# Patient Record
Sex: MALE | Race: WHITE | NOT HISPANIC OR LATINO | Employment: OTHER | URBAN - METROPOLITAN AREA
[De-identification: names, ages, dates, MRNs, and addresses within clinical notes are randomized per-mention and may not be internally consistent; named-entity substitution may affect disease eponyms.]

---

## 2017-04-16 ENCOUNTER — HOSPITAL ENCOUNTER (EMERGENCY)
Facility: HOSPITAL | Age: 82
Discharge: HOME | End: 2017-04-16
Attending: EMERGENCY MEDICINE
Payer: MEDICARE

## 2017-04-16 VITALS
BODY MASS INDEX: 31.58 KG/M2 | HEIGHT: 64 IN | RESPIRATION RATE: 16 BRPM | HEART RATE: 69 BPM | WEIGHT: 185 LBS | TEMPERATURE: 98 F | SYSTOLIC BLOOD PRESSURE: 139 MMHG | OXYGEN SATURATION: 97 % | DIASTOLIC BLOOD PRESSURE: 66 MMHG

## 2017-04-16 DIAGNOSIS — R42 DIZZY: ICD-10-CM

## 2017-04-16 DIAGNOSIS — R42 VERTIGO: Primary | ICD-10-CM

## 2017-04-16 LAB
ALBUMIN SERPL BCP-MCNC: 3.8 G/DL
ALP SERPL-CCNC: 99 U/L
ALT SERPL W/O P-5'-P-CCNC: 23 U/L
ANION GAP SERPL CALC-SCNC: 10 MMOL/L
AST SERPL-CCNC: 24 U/L
BASOPHILS # BLD AUTO: 0.03 K/UL
BASOPHILS NFR BLD: 0.3 %
BILIRUB SERPL-MCNC: 0.5 MG/DL
BUN SERPL-MCNC: 11 MG/DL
CALCIUM SERPL-MCNC: 9.1 MG/DL
CHLORIDE SERPL-SCNC: 103 MMOL/L
CO2 SERPL-SCNC: 26 MMOL/L
CREAT SERPL-MCNC: 0.8 MG/DL
DIFFERENTIAL METHOD: ABNORMAL
EOSINOPHIL # BLD AUTO: 0.2 K/UL
EOSINOPHIL NFR BLD: 1.4 %
ERYTHROCYTE [DISTWIDTH] IN BLOOD BY AUTOMATED COUNT: 14.3 %
EST. GFR  (AFRICAN AMERICAN): >60 ML/MIN/1.73 M^2
EST. GFR  (NON AFRICAN AMERICAN): >60 ML/MIN/1.73 M^2
GLUCOSE SERPL-MCNC: 118 MG/DL
HCT VFR BLD AUTO: 41.2 %
HGB BLD-MCNC: 13.8 G/DL
LYMPHOCYTES # BLD AUTO: 1.8 K/UL
LYMPHOCYTES NFR BLD: 17.6 %
MCH RBC QN AUTO: 29.7 PG
MCHC RBC AUTO-ENTMCNC: 33.5 %
MCV RBC AUTO: 89 FL
MONOCYTES # BLD AUTO: 0.9 K/UL
MONOCYTES NFR BLD: 8.4 %
NEUTROPHILS # BLD AUTO: 7.5 K/UL
NEUTROPHILS NFR BLD: 72.3 %
PLATELET # BLD AUTO: 248 K/UL
PMV BLD AUTO: 9.2 FL
POTASSIUM SERPL-SCNC: 4.1 MMOL/L
PROT SERPL-MCNC: 7.3 G/DL
RBC # BLD AUTO: 4.65 M/UL
SODIUM SERPL-SCNC: 139 MMOL/L
WBC # BLD AUTO: 10.37 K/UL

## 2017-04-16 PROCEDURE — 80053 COMPREHEN METABOLIC PANEL: CPT

## 2017-04-16 PROCEDURE — 85025 COMPLETE CBC W/AUTO DIFF WBC: CPT

## 2017-04-16 PROCEDURE — 99284 EMERGENCY DEPT VISIT MOD MDM: CPT | Mod: 25

## 2017-04-16 PROCEDURE — 93010 ELECTROCARDIOGRAM REPORT: CPT | Mod: ,,, | Performed by: INTERNAL MEDICINE

## 2017-04-16 PROCEDURE — 93005 ELECTROCARDIOGRAM TRACING: CPT

## 2017-04-16 PROCEDURE — 25000003 PHARM REV CODE 250: Performed by: EMERGENCY MEDICINE

## 2017-04-16 RX ORDER — MECLIZINE HYDROCHLORIDE 25 MG/1
25 TABLET ORAL 3 TIMES DAILY PRN
Qty: 20 TABLET | Refills: 0 | Status: SHIPPED | OUTPATIENT
Start: 2017-04-16 | End: 2024-03-06

## 2017-04-16 RX ORDER — MECLIZINE HYDROCHLORIDE 25 MG/1
25 TABLET ORAL
Status: COMPLETED | OUTPATIENT
Start: 2017-04-16 | End: 2017-04-16

## 2017-04-16 RX ADMIN — MECLIZINE HYDROCHLORIDE 25 MG: 25 TABLET ORAL at 08:04

## 2017-04-16 NOTE — ED AVS SNAPSHOT
[facility]  [address] [facility] [address]               Troy Chau   2017  5:55 PM   ED    Description:  Male : 1935   Department:  [facility]           Your Care was Coordinated By:     Provider Role From To    Kole Cardona Jr., MD Attending Provider 17 [phone] --      Reason for Visit     Dizziness           Diagnoses this Visit        Comments    Vertigo    -  Primary     Dizzy           ED Disposition     None           To Do List           Follow-up Information     Follow up with Tanvir Petersen MD. Schedule an appointment as soon as possible for a visit in 2 days.    Specialty:  Internal Medicine    Why:  As needed    Contact information:    [address] [facility] [address] [phone]         These Medications        Disp Refills Start End    meclizine (ANTIVERT) 25 mg tablet 20 tablet 0 2017     Take 1 tablet (25 mg total) by mouth 3 (three) times daily as needed for Dizziness. - Oral      [facility]     [facility] Nurse Care Line -  Assistance  Unless otherwise directed by your provider, please contact [facility], our nurse care line that is available for  assistance.     Registered nurses in the [facility] provide: appointment scheduling, clinical advisement, health education, and other advisory services.  Call: [phone] (toll free)               Medications           START taking these NEW medications        Refills    meclizine (ANTIVERT) 25 mg tablet 0    Sig: Take 1 tablet (25 mg total) by mouth 3 (three) times daily as needed for Dizziness.    Class: Print    Route: Oral           Verify that the below list of medications is an accurate representation of the medications you are currently taking.  If none reported, the list may be blank. If incorrect, please contact your healthcare provider. Carry this list with you in case of emergency.          "  Current Medications     aspirin (ECOTRIN) 81 MG EC tablet Take 81 mg by mouth once daily.    meclizine (ANTIVERT) 25 mg tablet Take 1 tablet (25 mg total) by mouth 3 (three) times daily as needed for Dizziness.    tamsulosin (FLOMAX) 0.4 mg Cp24 Take 0.4 mg by mouth once daily.           Clinical Reference Information           Your Vitals Were     BP Pulse Temp Resp Height Weight    187/83 (BP Location: Right arm, Patient Position: Sitting) 64 98 °F (36.7 °C) (Oral) 20 5' 4" (1.626 m) 83.9 kg (185 lb)    SpO2 BMI             96% 31.76 kg/m2         Allergies as of 4/16/2017     No Known Allergies      Immunizations Administered on Date of Encounter - 4/16/2017     None      ED Micro, Lab, POCT     Start Ordered       Status Ordering Provider    04/16/17 1819 04/16/17 1818  CBC auto differential  STAT      Final result     04/16/17 1819 04/16/17 1818  Comprehensive metabolic panel  STAT      In process       ED Imaging Orders     Start Ordered       Status Ordering Provider    04/16/17 1819 04/16/17 1818  CT Head Without Contrast  1 time imaging      Final result         Discharge Instructions         Vertigo (Unknown Cause)    In addition to helping with hearing, the inner ear is part of the balance center of your body. Problems with the inner ear can a false feeling of motion. This is called vertigo. Often, it feels as if you or the room is spinning. A vertigo attack may cause sudden nausea, vomiting and heavy sweating. Severe vertigo causes a loss of balance and can cause you to fall. During vertigo, small head movements and changes in body position will often make the symptoms worse. You may also have ringing in the ears called tinnitus.  An episode of vertigo may last seconds, minutes or hours. Once you are over the first episode, it may never come back. However, symptoms may return off and on.  The cause of your vertigo is not yet known. Possible causes of vertigo include:  · Inflammation of the inner " ear  · Disease of the nerves to the inner ear  · Movement of calcium particles in the inner ear  · Poor blood flow to the balance centers of the brain  · Migraine headaches  Home care  · If symptoms are severe, rest quietly in bed. Change positions very slowly. There is usually one position that will feel best, such as lying on one side or lying on your back with your head slightly raised on pillows.  · Do not drive a car or work with dangerous machinery until symptoms have been gone for at least one week.  · Take medicine as prescribed to relieve your symptoms. Unless another medicine was prescribed for symptoms of nausea, vomiting, and dizziness, you may use over-the-counter motion sickness pills. Ask your pharmacist for suggestions.  Follow-up care  Follow up with your healthcare provider or as directed. If you are referred to a specialist or for testing, make the appointment promptly.  When to seek medical advice  Call your healthcare provider if any of the following occur:  · Fever of 100.4°F (38ºC) or higher, or as directed by your healthcare provider  · Vertigo worsens or is not controlled by prescribed medicine   · Repeated vomiting not relieved by prescribed medicine   · Severe headache  · Confusion  · Weakness of an arm or leg or one side of the face  · Difficulty with speech or vision  · Loss of consciousness   · Seizure  Date Last Reviewed: 8/16/2015  © 8723-9657 [employer]. [address] [place] [address], [place] [address]. All rights reserved. This information is not intended as a substitute for professional medical care. Always follow your healthcare professional's instructions.          Vertigo (Unknown Cause)    In addition to helping with hearing, the inner ear is part of the balance center of your body. Problems with the inner ear can a false feeling of motion. This is called vertigo. Often, it feels as if you or the room is spinning. A vertigo attack may cause sudden nausea, vomiting and  heavy sweating. Severe vertigo causes a loss of balance and can cause you to fall. During vertigo, small head movements and changes in body position will often make the symptoms worse. You may also have ringing in the ears called tinnitus.  An episode of vertigo may last seconds, minutes or hours. Once you are over the first episode, it may never come back. However, symptoms may return off and on.  The cause of your vertigo is not yet known. Possible causes of vertigo include:  · Inflammation of the inner ear  · Disease of the nerves to the inner ear  · Movement of calcium particles in the inner ear  · Poor blood flow to the balance centers of the brain  · Migraine headaches  Home care  · If symptoms are severe, rest quietly in bed. Change positions very slowly. There is usually one position that will feel best, such as lying on one side or lying on your back with your head slightly raised on pillows.  · Do not drive a car or work with dangerous machinery until symptoms have been gone for at least one week.  · Take medicine as prescribed to relieve your symptoms. Unless another medicine was prescribed for symptoms of nausea, vomiting, and dizziness, you may use over-the-counter motion sickness pills. Ask your pharmacist for suggestions.  Follow-up care  Follow up with your healthcare provider or as directed. If you are referred to a specialist or for testing, make the appointment promptly.  When to seek medical advice  Call your healthcare provider if any of the following occur:  · Fever of 100.4°F (38ºC) or higher, or as directed by your healthcare provider  · Vertigo worsens or is not controlled by prescribed medicine   · Repeated vomiting not relieved by prescribed medicine   · Severe headache  · Confusion  · Weakness of an arm or leg or one side of the face  · Difficulty with speech or vision  · Loss of consciousness   · Seizure  Date Last Reviewed: 8/16/2015  © 9321-7767 [employer]. [address] [place] [address]  [place] [address]. All rights reserved. This information is not intended as a substitute for professional medical care. Always follow your healthcare professional's instructions.          [facility] Sign-Up     Activating your [facility] account is as easy as 1-2-3!     1) Visit [email or web address], select Sign Up Now, enter this activation code and your date of birth, then select Next.  [email or web address]  Expires: 5/31/2017  7:32 PM      2) Create a username and password to use when you visit [facility] in the future and select a security question in case you lose your password and select Next.    3) Enter your e-mail address and click Sign Up!    Additional Information  If you have questions, please e-mail [email or web address] or call [phone] to talk to our [facility] staff. Remember, [facility] is NOT to be used for urgent needs. For medical emergencies, dial 911.         Smoking Cessation     If you would like to quit smoking:   You may be eligible for free services if you are a [place] resident and started smoking cigarettes before September 1, 1988.  Call the Smoking Cessation Trust ([facility]) toll free at (635) 261-9545 or (424) 225-4411.   Call 1-800-QUIT-NOW if you do not meet the above criteria.   Contact us via email: [email or web address]   View our website for more information: [email or web address]         [facility]  complies with applicable Federal civil rights laws and does not discriminate on the basis of race, color, national origin, age, disability, or sex.        Language Assistance Services     ATTENTION: Language assistance services are available, free of charge. Please call [phone].      ATENCIÓN: Si habla huber, tiene a ball disposición servicios gratuitos de asistencia lingüística. Llame al [phone].     CHÚ Ý: N?u b?n nói Ti?ng Vi?t, có các d?ch v? h? tr? ngôn ng? mi?n phí dành cho b?n. G?i s? [phone].

## 2017-04-16 NOTE — ED PROVIDER NOTES
"SCRIBE #1 NOTE: I, Marie Manny, am scribing for, and in the presence of, Kole Cardona Jr., MD. I have scribed the entire note.      History      Chief Complaint   Patient presents with    Dizziness     states on and off throughout the day       Review of patient's allergies indicates:  No Known Allergies     HPI   HPI    4/16/2017, 6:20 PM   History obtained from the patient      History of Present Illness: Troy Chau is a 81 y.o. male patient who presents to the Emergency Department for dizziness which onset gradually throughout the day. Symptoms are episodic and moderate in severity. Pt reports when getting up after a while he got dizzy and fell. The patient describes the sxs as "the room spinning". No mitigating or exacerbating factors reported. No associated sxs at this time. Patient denies any head trauma, congestion, ear pain, cough, fever, chills, and all other sxs at this time. No prior Tx. No further complaints or concerns at this time.         Arrival mode: Personal vehicle     PCP: Tanvir Petersen MD       Past Medical History:  Past Medical History:   Diagnosis Date    Smoker     2 pack a day       Past Surgical History:  Past Surgical History:   Procedure Laterality Date    APPENDECTOMY      APPENDECTOMY      CHOLECYSTECTOMY      COLONOSCOPY      EYE SURGERY           Family History:  Family History   Problem Relation Age of Onset    Diabetes Mother     Heart disease Father        Social History:  Social History     Social History Main Topics    Smoking status: Current Every Day Smoker     Packs/day: 2.00     Years: 60.00    Smokeless tobacco: Unknown    Alcohol use Yes      Comment: not much lately    Drug use: No    Sexual activity: Unknown        ROS   Review of Systems   Constitutional: Negative for chills and fever.   HENT: Negative for congestion, ear pain and sore throat.    Respiratory: Negative for cough and shortness of breath.    Cardiovascular: Negative for chest pain. " "  Gastrointestinal: Negative for nausea.   Genitourinary: Negative for dysuria.   Musculoskeletal: Negative for back pain.   Skin: Negative for rash.   Neurological: Positive for dizziness. Negative for syncope and weakness.        (-) head trauma   Hematological: Does not bruise/bleed easily.   All other systems reviewed and are negative.      Physical Exam    Initial Vitals   BP Pulse Resp Temp SpO2   04/16/17 1754 04/16/17 1754 04/16/17 1754 04/16/17 1754 04/16/17 1754   187/83 72 20 98 °F (36.7 °C) 96 %      Physical Exam  Nursing Notes and Vital Signs Reviewed.  Constitutional: Patient is anxious. Awake and alert. Well-developed and well-nourished.  Head: Atraumatic. Normocephalic.  Eyes: PERRL. EOM intact. Conjunctivae are not pale. No scleral icterus.  ENT: Mucous membranes are moist. Oropharynx is clear and symmetric.    Neck: Supple. Full ROM. No lymphadenopathy.  Cardiovascular: Regular rate. Regular rhythm. No murmurs, rubs, or gallops. Distal pulses are 2+ and symmetric.  Pulmonary/Chest: No respiratory distress. Clear to auscultation bilaterally. No wheezing, rales, or rhonchi.  Abdominal: Soft and non-distended.  There is no tenderness.  No rebound, guarding, or rigidity. Good bowel sounds.  Genitourinary: No CVA tenderness  Musculoskeletal: Moves all extremities. No obvious deformities. No edema. No calf tenderness. Slight slow gate but family member says it's baseline.  Skin: Warm and dry.  Neurological:  Alert, awake, and appropriate.  Normal speech.  No acute focal neurological deficits are appreciated. Dizziness with head turn.   Psychiatric: Normal affect. Good eye contact. Appropriate in content.      ED Course    Procedures  ED Vital Signs:  Vitals:    04/16/17 1754 04/16/17 1901   BP: (!) 187/83    Pulse: 72 64   Resp: 20    Temp: 98 °F (36.7 °C)    TempSrc: Oral    SpO2: 96%    Weight: 83.9 kg (185 lb)    Height: 5' 4" (1.626 m)        Abnormal Lab Results:  Labs Reviewed   CBC W/ AUTO " DIFFERENTIAL - Abnormal; Notable for the following:        Result Value    Hemoglobin 13.8 (*)     Lymph% 17.6 (*)     All other components within normal limits   COMPREHENSIVE METABOLIC PANEL - Abnormal; Notable for the following:     Glucose 118 (*)     All other components within normal limits        All Lab Results:  Results for orders placed or performed during the hospital encounter of 04/16/17   CBC auto differential   Result Value Ref Range    WBC 10.37 3.90 - 12.70 K/uL    RBC 4.65 4.60 - 6.20 M/uL    Hemoglobin 13.8 (L) 14.0 - 18.0 g/dL    Hematocrit 41.2 40.0 - 54.0 %    MCV 89 82 - 98 fL    MCH 29.7 27.0 - 31.0 pg    MCHC 33.5 32.0 - 36.0 %    RDW 14.3 11.5 - 14.5 %    Platelets 248 150 - 350 K/uL    MPV 9.2 9.2 - 12.9 fL    Gran # 7.5 1.8 - 7.7 K/uL    Lymph # 1.8 1.0 - 4.8 K/uL    Mono # 0.9 0.3 - 1.0 K/uL    Eos # 0.2 0.0 - 0.5 K/uL    Baso # 0.03 0.00 - 0.20 K/uL    Gran% 72.3 38.0 - 73.0 %    Lymph% 17.6 (L) 18.0 - 48.0 %    Mono% 8.4 4.0 - 15.0 %    Eosinophil% 1.4 0.0 - 8.0 %    Basophil% 0.3 0.0 - 1.9 %    Differential Method Automated    Comprehensive metabolic panel   Result Value Ref Range    Sodium 139 136 - 145 mmol/L    Potassium 4.1 3.5 - 5.1 mmol/L    Chloride 103 95 - 110 mmol/L    CO2 26 23 - 29 mmol/L    Glucose 118 (H) 70 - 110 mg/dL    BUN, Bld 11 8 - 23 mg/dL    Creatinine 0.8 0.5 - 1.4 mg/dL    Calcium 9.1 8.7 - 10.5 mg/dL    Total Protein 7.3 6.0 - 8.4 g/dL    Albumin 3.8 3.5 - 5.2 g/dL    Total Bilirubin 0.5 0.1 - 1.0 mg/dL    Alkaline Phosphatase 99 55 - 135 U/L    AST 24 10 - 40 U/L    ALT 23 10 - 44 U/L    Anion Gap 10 8 - 16 mmol/L    eGFR if African American >60 >60 mL/min/1.73 m^2    eGFR if non African American >60 >60 mL/min/1.73 m^2         Imaging Results:  Imaging Results         CT Head Without Contrast (Final result) Result time:  04/16/17 18:38:03    Final result by Matt Landis MD (04/16/17 18:38:03)    Impression:         1. No CT evidence of acute intracranial  abnormality.  2.  Severe bilateral frontal lobe atrophy.    All CT scans at this facility use dose modulation, iterative reconstruction and/or weight based dosing when appropriate to reduce radiation dose to as low as reasonably achievable.      Electronically signed by: FARTUN KING MD  Date:     04/16/17  Time:    18:38     Narrative:    Exam: CT scan of the head without contrast    Clinical History:  dizziness.      Technique: Axial CT imaging was performed through the head without intravenous contrast.     Findings:    There is severe bilateral frontal lobe atrophy. No mass lesion or extra-axial fluid collection or cerebral edema.  No intracranial hemorrhage. Basilar cisterns and posterior fossa are normal. The visualized paranasal sinuses and mastoid air cells are clear. The skull is intact.             The EKG was ordered, reviewed, and independently interpreted by the ED provider.  Interpretation time: 19:01  Rate: 64 BPM  Rhythm: normal sinus rhythm  Interpretation: Pulmonary disease pattern. Left anterior fascicular block. Minimal voltage criteria for LVH, may be normal variant. No STEMI.             The Emergency Provider reviewed the vital signs and test results, which are outlined above.    ED Discussion       ED Medication(s):  Medications - No data to display    New Prescriptions    MECLIZINE (ANTIVERT) 25 MG TABLET    Take 1 tablet (25 mg total) by mouth 3 (three) times daily as needed for Dizziness.       Follow-up Information     Follow up with Tanvir Petersen MD. Schedule an appointment as soon as possible for a visit in 2 days.    Specialty:  Internal Medicine    Why:  As needed    Contact information:    [address] [facility] [address] [phone]              Medical Decision Making              Scribe Attestation:   Scribe #1: I performed the above scribed service and the documentation accurately describes the services I performed. I attest to the accuracy of the note.    Attending:    Physician Attestation Statement for Scribe #1: I, Kole Cardona Jr., MD, personally performed the services described in this documentation, as scribed by Marie Bryant, in my presence, and it is both accurate and complete.          Clinical Impression       ICD-10-CM ICD-9-CM   1. Vertigo R42 780.4   2. Dizzy R42 780.4       Disposition:   Disposition: Discharged  Condition: Stable         Kole Cardona Jr., MD  04/16/17 1957

## 2017-04-17 NOTE — DISCHARGE INSTRUCTIONS
Vertigo (Unknown Cause)    In addition to helping with hearing, the inner ear is part of the balance center of your body. Problems with the inner ear can a false feeling of motion. This is called vertigo. Often, it feels as if you or the room is spinning. A vertigo attack may cause sudden nausea, vomiting and heavy sweating. Severe vertigo causes a loss of balance and can cause you to fall. During vertigo, small head movements and changes in body position will often make the symptoms worse. You may also have ringing in the ears called tinnitus.  An episode of vertigo may last seconds, minutes or hours. Once you are over the first episode, it may never come back. However, symptoms may return off and on.  The cause of your vertigo is not yet known. Possible causes of vertigo include:  · Inflammation of the inner ear  · Disease of the nerves to the inner ear  · Movement of calcium particles in the inner ear  · Poor blood flow to the balance centers of the brain  · Migraine headaches  Home care  · If symptoms are severe, rest quietly in bed. Change positions very slowly. There is usually one position that will feel best, such as lying on one side or lying on your back with your head slightly raised on pillows.  · Do not drive a car or work with dangerous machinery until symptoms have been gone for at least one week.  · Take medicine as prescribed to relieve your symptoms. Unless another medicine was prescribed for symptoms of nausea, vomiting, and dizziness, you may use over-the-counter motion sickness pills. Ask your pharmacist for suggestions.  Follow-up care  Follow up with your healthcare provider or as directed. If you are referred to a specialist or for testing, make the appointment promptly.  When to seek medical advice  Call your healthcare provider if any of the following occur:  · Fever of 100.4°F (38ºC) or higher, or as directed by your healthcare provider  · Vertigo worsens or is not controlled  by prescribed medicine   · Repeated vomiting not relieved by prescribed medicine   · Severe headache  · Confusion  · Weakness of an arm or leg or one side of the face  · Difficulty with speech or vision  · Loss of consciousness   · Seizure  Date Last Reviewed: 8/16/2015 © 2000-2016 [employer]. [address] [place] [address]. All rights reserved. This information is not intended as a substitute for professional medical care. Always follow your healthcare professional's instructions.          Vertigo (Unknown Cause)    In addition to helping with hearing, the inner ear is part of the balance center of your body. Problems with the inner ear can a false feeling of motion. This is called vertigo. Often, it feels as if you or the room is spinning. A vertigo attack may cause sudden nausea, vomiting and heavy sweating. Severe vertigo causes a loss of balance and can cause you to fall. During vertigo, small head movements and changes in body position will often make the symptoms worse. You may also have ringing in the ears called tinnitus.  An episode of vertigo may last seconds, minutes or hours. Once you are over the first episode, it may never come back. However, symptoms may return off and on.  The cause of your vertigo is not yet known. Possible causes of vertigo include:  · Inflammation of the inner ear  · Disease of the nerves to the inner ear  · Movement of calcium particles in the inner ear  · Poor blood flow to the balance centers of the brain  · Migraine headaches  Home care  · If symptoms are severe, rest quietly in bed. Change positions very slowly. There is usually one position that will feel best, such as lying on one side or lying on your back with your head slightly raised on pillows.  · Do not drive a car or work with dangerous machinery until symptoms have been gone for at least one week.  · Take medicine as prescribed to relieve your symptoms. Unless another medicine was  prescribed for symptoms of nausea, vomiting, and dizziness, you may use over-the-counter motion sickness pills. Ask your pharmacist for suggestions.  Follow-up care  Follow up with your healthcare provider or as directed. If you are referred to a specialist or for testing, make the appointment promptly.  When to seek medical advice  Call your healthcare provider if any of the following occur:  · Fever of 100.4°F (38ºC) or higher, or as directed by your healthcare provider  · Vertigo worsens or is not controlled by prescribed medicine   · Repeated vomiting not relieved by prescribed medicine   · Severe headache  · Confusion  · Weakness of an arm or leg or one side of the face  · Difficulty with speech or vision  · Loss of consciousness   · Seizure  Date Last Reviewed: 8/16/2015  © 3803-5933 [employer]. [address] [place] [address], [place] [address]. All rights reserved. This information is not intended as a substitute for professional medical care. Always follow your healthcare professional's instructions.

## 2017-04-17 NOTE — ED NOTES
Pt reports relief in dizziness. Pt able to stand up and ambulate in room. NAD noted. AAO x 3. VSS. RR e/u. Airway open and patent. Family at bedside. Will continue with discharge.

## 2018-09-15 ENCOUNTER — HOSPITAL ENCOUNTER (EMERGENCY)
Facility: HOSPITAL | Age: 83
Discharge: HOME | End: 2018-09-15
Attending: EMERGENCY MEDICINE
Payer: MEDICARE

## 2018-09-15 VITALS
DIASTOLIC BLOOD PRESSURE: 72 MMHG | OXYGEN SATURATION: 97 % | SYSTOLIC BLOOD PRESSURE: 139 MMHG | HEART RATE: 72 BPM | TEMPERATURE: 98 F

## 2018-09-15 DIAGNOSIS — S16.1XXA STRAIN OF NECK MUSCLE, INITIAL ENCOUNTER: Primary | ICD-10-CM

## 2018-09-15 DIAGNOSIS — Z04.1 ENCOUNTER FOR EXAMINATION FOLLOWING MOTOR VEHICLE COLLISION (MVC): ICD-10-CM

## 2018-09-15 DIAGNOSIS — M54.2 CERVICALGIA: ICD-10-CM

## 2018-09-15 PROCEDURE — 25000003 PHARM REV CODE 250: Performed by: EMERGENCY MEDICINE

## 2018-09-15 PROCEDURE — 99283 EMERGENCY DEPT VISIT LOW MDM: CPT | Mod: 25

## 2018-09-15 RX ORDER — AMLODIPINE BESYLATE 5 MG/1
5 TABLET ORAL DAILY
COMMUNITY

## 2018-09-15 RX ORDER — CYCLOBENZAPRINE HCL 10 MG
10 TABLET ORAL 3 TIMES DAILY PRN
Qty: 15 TABLET | Refills: 0 | Status: SHIPPED | OUTPATIENT
Start: 2018-09-15 | End: 2018-09-20

## 2018-09-15 RX ORDER — HYDROCODONE BITARTRATE AND ACETAMINOPHEN 5; 325 MG/1; MG/1
1 TABLET ORAL
Status: COMPLETED | OUTPATIENT
Start: 2018-09-15 | End: 2018-09-15

## 2018-09-15 RX ORDER — CYCLOBENZAPRINE HCL 10 MG
10 TABLET ORAL
Status: COMPLETED | OUTPATIENT
Start: 2018-09-15 | End: 2018-09-15

## 2018-09-15 RX ADMIN — HYDROCODONE BITARTRATE AND ACETAMINOPHEN 1 TABLET: 5; 325 TABLET ORAL at 11:09

## 2018-09-15 RX ADMIN — CYCLOBENZAPRINE HYDROCHLORIDE 10 MG: 10 TABLET, FILM COATED ORAL at 11:09
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
## 2019-07-19 ENCOUNTER — HOSPITAL ENCOUNTER (OUTPATIENT)
Facility: HOSPITAL | Age: 84
Discharge: NURSING, REHAB OR LONG-TERM CARE | End: 2019-07-23
Attending: EMERGENCY MEDICINE | Admitting: HOSPITALIST
Payer: MEDICARE

## 2019-07-19 DIAGNOSIS — M25.552 BILATERAL HIP PAIN: ICD-10-CM

## 2019-07-19 DIAGNOSIS — T79.6XXA TRAUMATIC RHABDOMYOLYSIS, INITIAL ENCOUNTER: ICD-10-CM

## 2019-07-19 DIAGNOSIS — N17.9 ACUTE RENAL FAILURE DUE TO TRAUMATIC RHABDOMYOLYSIS: ICD-10-CM

## 2019-07-19 DIAGNOSIS — J18.9 PNEUMONIA OF LEFT LOWER LOBE DUE TO INFECTIOUS ORGANISM: ICD-10-CM

## 2019-07-19 DIAGNOSIS — R06.02 SOB (SHORTNESS OF BREATH): ICD-10-CM

## 2019-07-19 DIAGNOSIS — N17.9 AKI (ACUTE KIDNEY INJURY): ICD-10-CM
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
## 2019-07-19 DIAGNOSIS — J44.9 CHRONIC OBSTRUCTIVE PULMONARY DISEASE, UNSPECIFIED COPD TYPE: Primary | ICD-10-CM

## 2019-07-19 DIAGNOSIS — S80.212A ABRASION OF KNEE, BILATERAL: ICD-10-CM

## 2019-07-19 DIAGNOSIS — R06.2 WHEEZING: ICD-10-CM

## 2019-07-19 DIAGNOSIS — T79.6XXA ACUTE RENAL FAILURE DUE TO TRAUMATIC RHABDOMYOLYSIS: ICD-10-CM

## 2019-07-19 DIAGNOSIS — S80.211A ABRASION OF KNEE, BILATERAL: ICD-10-CM

## 2019-07-19 DIAGNOSIS — M25.551 BILATERAL HIP PAIN: ICD-10-CM

## 2019-07-19 PROCEDURE — 99292 CRITICAL CARE ADDL 30 MIN: CPT

## 2019-07-19 PROCEDURE — 93010 EKG 12-LEAD: ICD-10-PCS | Mod: ,,, | Performed by: INTERNAL MEDICINE

## 2019-07-19 PROCEDURE — 93005 ELECTROCARDIOGRAM TRACING: CPT

## 2019-07-19 PROCEDURE — 93010 ELECTROCARDIOGRAM REPORT: CPT | Mod: ,,, | Performed by: INTERNAL MEDICINE

## 2019-07-19 PROCEDURE — 96365 THER/PROPH/DIAG IV INF INIT: CPT

## 2019-07-19 PROCEDURE — 99291 CRITICAL CARE FIRST HOUR: CPT | Mod: 25

## 2019-07-19 PROCEDURE — 96361 HYDRATE IV INFUSION ADD-ON: CPT

## 2019-07-19 RX ORDER — ATORVASTATIN CALCIUM 10 MG/1
10 TABLET, FILM COATED ORAL DAILY
COMMUNITY
End: 2024-03-06

## 2019-07-19 RX ORDER — ACETAMINOPHEN 500 MG
1000 TABLET ORAL
Status: COMPLETED | OUTPATIENT
Start: 2019-07-20 | End: 2019-07-20

## 2019-07-20 PROBLEM — N40.0 BPH (BENIGN PROSTATIC HYPERPLASIA): Status: ACTIVE | Noted: 2019-07-20

## 2019-07-20 PROBLEM — R79.89 ELEVATED TROPONIN: Status: ACTIVE | Noted: 2019-07-20
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
## 2019-07-20 PROBLEM — J90 CHRONIC BILATERAL PLEURAL EFFUSIONS: Status: ACTIVE | Noted: 2019-07-20
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
## 2019-07-20 PROBLEM — J44.9 COPD (CHRONIC OBSTRUCTIVE PULMONARY DISEASE): Status: ACTIVE | Noted: 2019-07-20

## 2019-07-20 PROBLEM — D72.829 ELEVATED WBC COUNT: Status: ACTIVE | Noted: 2019-07-20

## 2019-07-20 PROBLEM — R60.0 PEDAL EDEMA: Status: ACTIVE | Noted: 2019-07-20

## 2019-07-20 PROBLEM — T79.6XXA ACUTE RENAL FAILURE DUE TO TRAUMATIC RHABDOMYOLYSIS: Status: ACTIVE | Noted: 2019-07-20

## 2019-07-20 PROBLEM — I10 ESSENTIAL HYPERTENSION: Status: ACTIVE | Noted: 2019-07-20

## 2019-07-20 PROBLEM — N17.9 AKI (ACUTE KIDNEY INJURY): Status: ACTIVE | Noted: 2019-07-20

## 2019-07-20 LAB
ALBUMIN SERPL BCP-MCNC: 3.2 G/DL (ref 3.5–5.2)
ALBUMIN SERPL BCP-MCNC: 4.1 G/DL (ref 3.5–5.2)
ALP SERPL-CCNC: 133 U/L (ref 55–135)
ALT SERPL W/O P-5'-P-CCNC: 31 U/L (ref 10–44)
ANION GAP SERPL CALC-SCNC: 13 MMOL/L (ref 8–16)
ANION GAP SERPL CALC-SCNC: 18 MMOL/L (ref 8–16)
AST SERPL-CCNC: 40 U/L (ref 10–40)
BASOPHILS # BLD AUTO: 0.02 K/UL (ref 0–0.2)
BASOPHILS # BLD AUTO: 0.02 K/UL (ref 0–0.2)
BASOPHILS NFR BLD: 0.1 % (ref 0–1.9)
BASOPHILS NFR BLD: 0.1 % (ref 0–1.9)
BILIRUB SERPL-MCNC: 0.6 MG/DL (ref 0.1–1)
BILIRUB UR QL STRIP: NEGATIVE
BNP SERPL-MCNC: 20 PG/ML (ref 0–99)
BUN SERPL-MCNC: 28 MG/DL (ref 8–23)
BUN SERPL-MCNC: 29 MG/DL (ref 8–23)
CALCIUM SERPL-MCNC: 10.2 MG/DL (ref 8.7–10.5)
CALCIUM SERPL-MCNC: 8.3 MG/DL (ref 8.7–10.5)
CHLORIDE SERPL-SCNC: 102 MMOL/L (ref 95–110)
CHLORIDE SERPL-SCNC: 98 MMOL/L (ref 95–110)
CK MB SERPL-MCNC: 15.2 NG/ML (ref 0.1–6.5)
CK MB SERPL-RTO: 1.5 % (ref 0–5)
CK SERPL-CCNC: 1000 U/L (ref 20–200)
CK SERPL-CCNC: 2149 U/L (ref 20–200)
CLARITY UR: CLEAR
CO2 SERPL-SCNC: 19 MMOL/L (ref 23–29)
CO2 SERPL-SCNC: 20 MMOL/L (ref 23–29)
COLOR UR: YELLOW
CREAT SERPL-MCNC: 1.6 MG/DL (ref 0.5–1.4)
CREAT SERPL-MCNC: 2.2 MG/DL (ref 0.5–1.4)
DIFFERENTIAL METHOD: ABNORMAL
DIFFERENTIAL METHOD: ABNORMAL
EOSINOPHIL # BLD AUTO: 0 K/UL (ref 0–0.5)
EOSINOPHIL # BLD AUTO: 0 K/UL (ref 0–0.5)
EOSINOPHIL NFR BLD: 0.1 % (ref 0–8)
EOSINOPHIL NFR BLD: 0.1 % (ref 0–8)
ERYTHROCYTE [DISTWIDTH] IN BLOOD BY AUTOMATED COUNT: 15.2 % (ref 11.5–14.5)
ERYTHROCYTE [DISTWIDTH] IN BLOOD BY AUTOMATED COUNT: 15.2 % (ref 11.5–14.5)
EST. GFR  (AFRICAN AMERICAN): 31 ML/MIN/1.73 M^2
EST. GFR  (AFRICAN AMERICAN): 45 ML/MIN/1.73 M^2
EST. GFR  (NON AFRICAN AMERICAN): 27 ML/MIN/1.73 M^2
EST. GFR  (NON AFRICAN AMERICAN): 39 ML/MIN/1.73 M^2
GLUCOSE SERPL-MCNC: 136 MG/DL (ref 70–110)
GLUCOSE SERPL-MCNC: 185 MG/DL (ref 70–110)
GLUCOSE UR QL STRIP: NEGATIVE
HCT VFR BLD AUTO: 34.8 % (ref 40–54)
HCT VFR BLD AUTO: 39.9 % (ref 40–54)
HGB BLD-MCNC: 11.5 G/DL (ref 14–18)
HGB BLD-MCNC: 13.2 G/DL (ref 14–18)
HGB UR QL STRIP: ABNORMAL
INR PPP: 1 (ref 0.8–1.2)
KETONES UR QL STRIP: ABNORMAL
LACTATE SERPL-SCNC: 2.2 MMOL/L (ref 0.5–2.2)
LEUKOCYTE ESTERASE UR QL STRIP: NEGATIVE
LIPASE SERPL-CCNC: 20 U/L (ref 4–60)
LYMPHOCYTES # BLD AUTO: 1.1 K/UL (ref 1–4.8)
LYMPHOCYTES # BLD AUTO: 2 K/UL (ref 1–4.8)
LYMPHOCYTES NFR BLD: 6.2 % (ref 18–48)
LYMPHOCYTES NFR BLD: 7.5 % (ref 18–48)
MAGNESIUM SERPL-MCNC: 1.9 MG/DL (ref 1.6–2.6)
MAGNESIUM SERPL-MCNC: 2.3 MG/DL (ref 1.6–2.6)
MCH RBC QN AUTO: 28.6 PG (ref 27–31)
MCH RBC QN AUTO: 28.8 PG (ref 27–31)
MCHC RBC AUTO-ENTMCNC: 33 G/DL (ref 32–36)
MCHC RBC AUTO-ENTMCNC: 33.1 G/DL (ref 32–36)
MCV RBC AUTO: 87 FL (ref 82–98)
MCV RBC AUTO: 87 FL (ref 82–98)
MICROSCOPIC COMMENT: NORMAL
MONOCYTES # BLD AUTO: 1 K/UL (ref 0.3–1)
MONOCYTES # BLD AUTO: 1.6 K/UL (ref 0.3–1)
MONOCYTES NFR BLD: 3.9 % (ref 4–15)
MONOCYTES NFR BLD: 8.9 % (ref 4–15)
NEUTROPHILS # BLD AUTO: 15.5 K/UL (ref 1.8–7.7)
NEUTROPHILS # BLD AUTO: 23.1 K/UL (ref 1.8–7.7)
NEUTROPHILS NFR BLD: 85.1 % (ref 38–73)
NEUTROPHILS NFR BLD: 88.9 % (ref 38–73)
NITRITE UR QL STRIP: NEGATIVE
PH UR STRIP: 6 [PH] (ref 5–8)
PHOSPHATE SERPL-MCNC: 2.6 MG/DL (ref 2.7–4.5)
PHOSPHATE SERPL-MCNC: 2.8 MG/DL (ref 2.7–4.5)
PHOSPHATE SERPL-MCNC: 2.8 MG/DL (ref 2.7–4.5)
PLATELET # BLD AUTO: 245 K/UL (ref 150–350)
PLATELET # BLD AUTO: 271 K/UL (ref 150–350)
PMV BLD AUTO: 9.3 FL (ref 9.2–12.9)
PMV BLD AUTO: 9.7 FL (ref 9.2–12.9)
POTASSIUM SERPL-SCNC: 4.5 MMOL/L (ref 3.5–5.1)
POTASSIUM SERPL-SCNC: 4.9 MMOL/L (ref 3.5–5.1)
PROCALCITONIN SERPL IA-MCNC: 1.47 NG/ML
PROT SERPL-MCNC: 7.7 G/DL (ref 6–8.4)
PROT UR QL STRIP: ABNORMAL
PROTHROMBIN TIME: 10.8 SEC (ref 9–12.5)
RBC # BLD AUTO: 4.02 M/UL (ref 4.6–6.2)
RBC # BLD AUTO: 4.58 M/UL (ref 4.6–6.2)
RBC #/AREA URNS HPF: 4 /HPF (ref 0–4)
SODIUM SERPL-SCNC: 134 MMOL/L (ref 136–145)
SODIUM SERPL-SCNC: 136 MMOL/L (ref 136–145)
SP GR UR STRIP: 1.02 (ref 1–1.03)
TROPONIN I SERPL DL<=0.01 NG/ML-MCNC: 0.05 NG/ML (ref 0–0.03)
TROPONIN I SERPL DL<=0.01 NG/ML-MCNC: 0.07 NG/ML (ref 0–0.03)
TROPONIN I SERPL DL<=0.01 NG/ML-MCNC: 0.07 NG/ML (ref 0–0.03)
URN SPEC COLLECT METH UR: ABNORMAL
UROBILINOGEN UR STRIP-ACNC: 1 EU/DL
WBC # BLD AUTO: 18.26 K/UL (ref 3.9–12.7)
WBC # BLD AUTO: 26.17 K/UL (ref 3.9–12.7)

## 2019-07-20 PROCEDURE — 82550 ASSAY OF CK (CPK): CPT | Mod: 91

## 2019-07-20 PROCEDURE — 97530 THERAPEUTIC ACTIVITIES: CPT | Performed by: PHYSICAL THERAPIST

## 2019-07-20 PROCEDURE — 84484 ASSAY OF TROPONIN QUANT: CPT

## 2019-07-20 PROCEDURE — 80307 DRUG TEST PRSMV CHEM ANLYZR: CPT

## 2019-07-20 PROCEDURE — 87040 BLOOD CULTURE FOR BACTERIA: CPT | Mod: 59

## 2019-07-20 PROCEDURE — 96375 TX/PRO/DX INJ NEW DRUG ADDON: CPT | Mod: 59

## 2019-07-20 PROCEDURE — 25000242 PHARM REV CODE 250 ALT 637 W/ HCPCS: Performed by: HOSPITALIST

## 2019-07-20 PROCEDURE — 97162 PT EVAL MOD COMPLEX 30 MIN: CPT

## 2019-07-20 PROCEDURE — 84484 ASSAY OF TROPONIN QUANT: CPT | Mod: 91

## 2019-07-20 PROCEDURE — 25000242 PHARM REV CODE 250 ALT 637 W/ HCPCS: Performed by: EMERGENCY MEDICINE

## 2019-07-20 PROCEDURE — 83735 ASSAY OF MAGNESIUM: CPT | Mod: 91

## 2019-07-20 PROCEDURE — 82553 CREATINE MB FRACTION: CPT

## 2019-07-20 PROCEDURE — 93306 TTE W/DOPPLER COMPLETE: CPT | Mod: 26,,, | Performed by: INTERNAL MEDICINE

## 2019-07-20 PROCEDURE — 93306 2D ECHO WITH COLOR FLOW DOPPLER: ICD-10-PCS | Mod: 26,,, | Performed by: INTERNAL MEDICINE

## 2019-07-20 PROCEDURE — 85025 COMPLETE CBC W/AUTO DIFF WBC: CPT | Mod: 91

## 2019-07-20 PROCEDURE — 97530 THERAPEUTIC ACTIVITIES: CPT

## 2019-07-20 PROCEDURE — 94760 N-INVAS EAR/PLS OXIMETRY 1: CPT

## 2019-07-20 PROCEDURE — 83690 ASSAY OF LIPASE: CPT

## 2019-07-20 PROCEDURE — 63600175 PHARM REV CODE 636 W HCPCS: Performed by: HOSPITALIST

## 2019-07-20 PROCEDURE — 63600175 PHARM REV CODE 636 W HCPCS: Performed by: NURSE PRACTITIONER

## 2019-07-20 PROCEDURE — 63600175 PHARM REV CODE 636 W HCPCS: Performed by: EMERGENCY MEDICINE

## 2019-07-20 PROCEDURE — 25000003 PHARM REV CODE 250: Performed by: NURSE PRACTITIONER

## 2019-07-20 PROCEDURE — 25000242 PHARM REV CODE 250 ALT 637 W/ HCPCS: Performed by: NURSE PRACTITIONER

## 2019-07-20 PROCEDURE — 96361 HYDRATE IV INFUSION ADD-ON: CPT

## 2019-07-20 PROCEDURE — 36415 COLL VENOUS BLD VENIPUNCTURE: CPT

## 2019-07-20 PROCEDURE — 96372 THER/PROPH/DIAG INJ SC/IM: CPT | Mod: 59

## 2019-07-20 PROCEDURE — 84100 ASSAY OF PHOSPHORUS: CPT

## 2019-07-20 PROCEDURE — 25000003 PHARM REV CODE 250: Performed by: HOSPITALIST

## 2019-07-20 PROCEDURE — C8929 TTE W OR WO FOL WCON,DOPPLER: HCPCS

## 2019-07-20 PROCEDURE — 94761 N-INVAS EAR/PLS OXIMETRY MLT: CPT

## 2019-07-20 PROCEDURE — G0378 HOSPITAL OBSERVATION PER HR: HCPCS

## 2019-07-20 PROCEDURE — 84145 PROCALCITONIN (PCT): CPT

## 2019-07-20 PROCEDURE — 97167 OT EVAL HIGH COMPLEX 60 MIN: CPT | Performed by: PHYSICAL THERAPIST

## 2019-07-20 PROCEDURE — 27000221 HC OXYGEN, UP TO 24 HOURS

## 2019-07-20 PROCEDURE — 85610 PROTHROMBIN TIME: CPT

## 2019-07-20 PROCEDURE — 83735 ASSAY OF MAGNESIUM: CPT

## 2019-07-20 PROCEDURE — 94640 AIRWAY INHALATION TREATMENT: CPT

## 2019-07-20 PROCEDURE — 81000 URINALYSIS NONAUTO W/SCOPE: CPT | Mod: 59

## 2019-07-20 PROCEDURE — 80053 COMPREHEN METABOLIC PANEL: CPT

## 2019-07-20 PROCEDURE — 96376 TX/PRO/DX INJ SAME DRUG ADON: CPT | Mod: 59

## 2019-07-20 PROCEDURE — 96365 THER/PROPH/DIAG IV INF INIT: CPT

## 2019-07-20 PROCEDURE — 82550 ASSAY OF CK (CPK): CPT

## 2019-07-20 PROCEDURE — 25000003 PHARM REV CODE 250: Performed by: EMERGENCY MEDICINE

## 2019-07-20 PROCEDURE — 83880 ASSAY OF NATRIURETIC PEPTIDE: CPT

## 2019-07-20 PROCEDURE — 83605 ASSAY OF LACTIC ACID: CPT

## 2019-07-20 PROCEDURE — 80069 RENAL FUNCTION PANEL: CPT

## 2019-07-20 RX ORDER — HEPARIN SODIUM 5000 [USP'U]/ML
5000 INJECTION, SOLUTION INTRAVENOUS; SUBCUTANEOUS EVERY 8 HOURS
Status: DISCONTINUED | OUTPATIENT
Start: 2019-07-20 | End: 2019-07-23 | Stop reason: HOSPADM

## 2019-07-20 RX ORDER — IPRATROPIUM BROMIDE AND ALBUTEROL SULFATE 2.5; .5 MG/3ML; MG/3ML
3 SOLUTION RESPIRATORY (INHALATION)
Status: COMPLETED | OUTPATIENT
Start: 2019-07-20 | End: 2019-07-20

## 2019-07-20 RX ORDER — FUROSEMIDE 10 MG/ML
40 INJECTION INTRAMUSCULAR; INTRAVENOUS ONCE
Status: COMPLETED | OUTPATIENT
Start: 2019-07-20 | End: 2019-07-20

## 2019-07-20 RX ORDER — IPRATROPIUM BROMIDE AND ALBUTEROL SULFATE 2.5; .5 MG/3ML; MG/3ML
3 SOLUTION RESPIRATORY (INHALATION) ONCE
Status: COMPLETED | OUTPATIENT
Start: 2019-07-20 | End: 2019-07-20

## 2019-07-20 RX ORDER — ALBUTEROL SULFATE 0.83 MG/ML
2.5 SOLUTION RESPIRATORY (INHALATION) EVERY 6 HOURS PRN
COMMUNITY
End: 2024-03-06

## 2019-07-20 RX ORDER — SODIUM CHLORIDE 0.9 % (FLUSH) 0.9 %
5 SYRINGE (ML) INJECTION
Status: DISCONTINUED | OUTPATIENT
Start: 2019-07-20 | End: 2019-07-23 | Stop reason: HOSPADM

## 2019-07-20 RX ORDER — AMLODIPINE BESYLATE 2.5 MG/1
2.5 TABLET ORAL DAILY
Status: DISCONTINUED | OUTPATIENT
Start: 2019-07-20 | End: 2019-07-23 | Stop reason: HOSPADM

## 2019-07-20 RX ORDER — IBUPROFEN 200 MG
16 TABLET ORAL
Status: DISCONTINUED | OUTPATIENT
Start: 2019-07-20 | End: 2019-07-23 | Stop reason: HOSPADM

## 2019-07-20 RX ORDER — METHYLPREDNISOLONE SOD SUCC 125 MG
125 VIAL (EA) INJECTION ONCE
Status: COMPLETED | OUTPATIENT
Start: 2019-07-20 | End: 2019-07-20

## 2019-07-20 RX ORDER — ASPIRIN 81 MG/1
81 TABLET ORAL DAILY
Status: DISCONTINUED | OUTPATIENT
Start: 2019-07-20 | End: 2019-07-23 | Stop reason: HOSPADM

## 2019-07-20 RX ORDER — ATORVASTATIN CALCIUM 10 MG/1
10 TABLET, FILM COATED ORAL DAILY
Status: DISCONTINUED | OUTPATIENT
Start: 2019-07-20 | End: 2019-07-23 | Stop reason: HOSPADM

## 2019-07-20 RX ORDER — POTASSIUM CHLORIDE 750 MG/1
10 TABLET, EXTENDED RELEASE ORAL ONCE
COMMUNITY
End: 2024-03-06

## 2019-07-20 RX ORDER — BUPROPION HYDROCHLORIDE 300 MG/1
300 TABLET ORAL DAILY
COMMUNITY
End: 2024-03-06

## 2019-07-20 RX ORDER — ONDANSETRON 2 MG/ML
4 INJECTION INTRAMUSCULAR; INTRAVENOUS EVERY 8 HOURS PRN
Status: DISCONTINUED | OUTPATIENT
Start: 2019-07-20 | End: 2019-07-23 | Stop reason: HOSPADM

## 2019-07-20 RX ORDER — HYDROCODONE BITARTRATE AND ACETAMINOPHEN 5; 325 MG/1; MG/1
1 TABLET ORAL EVERY 6 HOURS PRN
Status: DISCONTINUED | OUTPATIENT
Start: 2019-07-20 | End: 2019-07-23 | Stop reason: HOSPADM

## 2019-07-20 RX ORDER — IPRATROPIUM BROMIDE AND ALBUTEROL SULFATE 2.5; .5 MG/3ML; MG/3ML
3 SOLUTION RESPIRATORY (INHALATION) EVERY 6 HOURS PRN
Status: DISCONTINUED | OUTPATIENT
Start: 2019-07-20 | End: 2019-07-20

## 2019-07-20 RX ORDER — BUPROPION HYDROCHLORIDE 150 MG/1
300 TABLET ORAL DAILY
Status: DISCONTINUED | OUTPATIENT
Start: 2019-07-20 | End: 2019-07-23 | Stop reason: HOSPADM

## 2019-07-20 RX ORDER — GLUCAGON 1 MG
1 KIT INJECTION
Status: DISCONTINUED | OUTPATIENT
Start: 2019-07-20 | End: 2019-07-23 | Stop reason: HOSPADM

## 2019-07-20 RX ORDER — IBUPROFEN 200 MG
24 TABLET ORAL
Status: DISCONTINUED | OUTPATIENT
Start: 2019-07-20 | End: 2019-07-23 | Stop reason: HOSPADM

## 2019-07-20 RX ORDER — TAMSULOSIN HYDROCHLORIDE 0.4 MG/1
0.4 CAPSULE ORAL DAILY
Status: DISCONTINUED | OUTPATIENT
Start: 2019-07-20 | End: 2019-07-23 | Stop reason: HOSPADM

## 2019-07-20 RX ORDER — FUROSEMIDE 40 MG/1
40 TABLET ORAL DAILY
COMMUNITY

## 2019-07-20 RX ORDER — IPRATROPIUM BROMIDE AND ALBUTEROL SULFATE 2.5; .5 MG/3ML; MG/3ML
3 SOLUTION RESPIRATORY (INHALATION) EVERY 4 HOURS
Status: DISCONTINUED | OUTPATIENT
Start: 2019-07-20 | End: 2019-07-23 | Stop reason: HOSPADM

## 2019-07-20 RX ORDER — SODIUM CHLORIDE 9 MG/ML
INJECTION, SOLUTION INTRAVENOUS CONTINUOUS
Status: DISCONTINUED | OUTPATIENT
Start: 2019-07-20 | End: 2019-07-20

## 2019-07-20 RX ADMIN — IPRATROPIUM BROMIDE AND ALBUTEROL SULFATE 3 ML: .5; 3 SOLUTION RESPIRATORY (INHALATION) at 09:07

## 2019-07-20 RX ADMIN — FUROSEMIDE 40 MG: 10 INJECTION, SOLUTION INTRAVENOUS at 03:07

## 2019-07-20 RX ADMIN — HYDROCODONE BITARTRATE AND ACETAMINOPHEN 1 TABLET: 5; 325 TABLET ORAL at 03:07

## 2019-07-20 RX ADMIN — HEPARIN SODIUM 5000 UNITS: 5000 INJECTION, SOLUTION INTRAVENOUS; SUBCUTANEOUS at 05:07

## 2019-07-20 RX ADMIN — ACETAMINOPHEN 1000 MG: 500 TABLET ORAL at 01:07

## 2019-07-20 RX ADMIN — IPRATROPIUM BROMIDE AND ALBUTEROL SULFATE 3 ML: .5; 3 SOLUTION RESPIRATORY (INHALATION) at 02:07

## 2019-07-20 RX ADMIN — HYDROCODONE BITARTRATE AND ACETAMINOPHEN 1 TABLET: 5; 325 TABLET ORAL at 11:07

## 2019-07-20 RX ADMIN — METHYLPREDNISOLONE SODIUM SUCCINATE 40 MG: 40 INJECTION, POWDER, FOR SOLUTION INTRAMUSCULAR; INTRAVENOUS at 11:07

## 2019-07-20 RX ADMIN — ASPIRIN 81 MG: 81 TABLET, COATED ORAL at 08:07

## 2019-07-20 RX ADMIN — BUPROPION HYDROCHLORIDE 300 MG: 150 TABLET, EXTENDED RELEASE ORAL at 08:07

## 2019-07-20 RX ADMIN — IPRATROPIUM BROMIDE AND ALBUTEROL SULFATE 3 ML: .5; 3 SOLUTION RESPIRATORY (INHALATION) at 12:07

## 2019-07-20 RX ADMIN — IPRATROPIUM BROMIDE AND ALBUTEROL SULFATE 3 ML: .5; 3 SOLUTION RESPIRATORY (INHALATION) at 08:07

## 2019-07-20 RX ADMIN — SODIUM CHLORIDE 2517 ML: 0.9 INJECTION, SOLUTION INTRAVENOUS at 01:07

## 2019-07-20 RX ADMIN — HEPARIN SODIUM 5000 UNITS: 5000 INJECTION, SOLUTION INTRAVENOUS; SUBCUTANEOUS at 03:07

## 2019-07-20 RX ADMIN — ATORVASTATIN CALCIUM 10 MG: 10 TABLET, FILM COATED ORAL at 08:07

## 2019-07-20 RX ADMIN — SODIUM CHLORIDE: 0.9 INJECTION, SOLUTION INTRAVENOUS at 05:07

## 2019-07-20 RX ADMIN — METHYLPREDNISOLONE SODIUM SUCCINATE 40 MG: 40 INJECTION, POWDER, FOR SOLUTION INTRAMUSCULAR; INTRAVENOUS at 06:07

## 2019-07-20 RX ADMIN — TAMSULOSIN HYDROCHLORIDE 0.4 MG: 0.4 CAPSULE ORAL at 08:07

## 2019-07-20 RX ADMIN — AMLODIPINE BESYLATE 2.5 MG: 2.5 TABLET ORAL at 08:07

## 2019-07-20 RX ADMIN — IPRATROPIUM BROMIDE AND ALBUTEROL SULFATE 3 ML: .5; 3 SOLUTION RESPIRATORY (INHALATION) at 03:07

## 2019-07-20 RX ADMIN — HEPARIN SODIUM 5000 UNITS: 5000 INJECTION, SOLUTION INTRAVENOUS; SUBCUTANEOUS at 09:07

## 2019-07-20 RX ADMIN — METHYLPREDNISOLONE SODIUM SUCCINATE 125 MG: 125 INJECTION, POWDER, FOR SOLUTION INTRAMUSCULAR; INTRAVENOUS at 03:07

## 2019-07-20 RX ADMIN — PIPERACILLIN AND TAZOBACTAM 3.38 G: 3; .375 INJECTION, POWDER, LYOPHILIZED, FOR SOLUTION INTRAVENOUS; PARENTERAL at 02:07

## 2019-07-20 NOTE — HPI
83M h/o HTN, HLD, COPD, and BPH presents s/p mechanical fall.  Incurred multiple abrasions involving both arms, both knees, hip and back, as well as head trauma.  Denies LOC, dizziness, seizure-like activity, muscle weakness, numbness, HA, SOB, CP, ab pain, constipation/diarrhea, recent illness.  In ER, vitals stable.  Imaging of head, both arms, both knees were negative for acute fx or process.  CXR show increase in left lower lobe opacity.   Wbc 26, BUN 29, Cr 2.2, CPK 1000.   ER physician (Justina) gave patient 30ml/kg fluid bolus and zosyn IV bolus with concerned for sepsis.  Because CXR findings more consistent with pleural effusion than pnuemonia, CT chest requested by Hospital Medicine to not only locate a source of infection but also evaluate for pulmonary hemorrhage, hemothorax  s/p fall and trauma.  CT chest statrad reading was negative for any acute findings suggesting hemothorax, pulmonary hemorrhage, significant pleural effusion or pneumonia.   Patient placed in observation for ADRIAN due to traumatic rhabdomyolysis s/p mechanical fall.

## 2019-07-20 NOTE — CONSULTS
Met with pt and his niece Trinidad to discuss tx team recommendation of SNF.  Pt and family have discussed possibility of going to SNF and are open to placement, preferably in [place] because it is close to the niece's home.  CM provided list of SNF providers for pt and niece to review.

## 2019-07-20 NOTE — PT/OT/SLP EVAL
Physical Therapy Evaluation    Patient Name:  Troy Chau   MRN:  [id number]    Recommendations:     Discharge Recommendations:  nursing facility, skilled   Discharge Equipment Recommendations: walker, rolling, commode, grab bar, tub/shower, grab bar, toilet   Barriers to discharge: Decreased caregiver support    Assessment:     Troy Chau is a 83 y.o. male admitted with a medical diagnosis of Acute renal failure due to traumatic rhabdomyolysis.  He presents with the following impairments/functional limitations:  weakness, impaired functional mobilty, decreased safety awareness, impaired endurance, gait instability, pain, impaired cardiopulmonary response to activity, impaired balance, impaired self care skills.    Rehab Prognosis: Fair; patient would benefit from acute skilled PT services to address these deficits and reach maximum level of function.    Recent Surgery: * No surgery found *      Plan:     During this hospitalization, patient to be seen 5 x/week to address the identified rehab impairments via gait training, therapeutic activities, therapeutic exercises and progress toward the following goals:    · Plan of Care Expires:  07/27/19    Subjective     Chief Complaint: weakness; soreness all over; sob  Patient/Family Comments/goals: to get stronger and go home  Pain/Comfort:  · Pain Rating 1: 7/10  · Location - Side 1: Bilateral  · Location - Orientation 1: generalized  · Pain Addressed 1: Distraction, Cessation of Activity  · Pain Rating Post-Intervention 1: 5/10(at rest)    Patients cultural, spiritual, Zoroastrianism conflicts given the current situation:      Living Environment:  Lives alone in one story home/no stairs; niece helps when she can but not daily  Prior to admission, patients level of function was mod indep until last 2 weeks/fell yesterday.  Equipment used at home: cane, straight(has tub bench but doesn't use).  DME owned (not currently used): transfer tub bench.  Upon discharge, patient  will have assistance from niece but limited.    Objective:     Communicated with DANN Cisse prior to session.  Patient found HOB elevated with telemetry, peripheral IV  upon PT entry to room.    General Precautions: Standard, fall, respiratory   Orthopedic Precautions:N/A   Braces: N/A     Exams:  · B LE 3-/5 grossly and rom wfl but sore r/t fall    Functional Mobility:  · Bed Mobility: max A x 1-2 people with inc sob - cues for log-rolling; c/o soreness in r UE  · Transfers: mod A x 2 from high ER bed - cues for safety  · Gt - Took several side steps mod A x 2, unsteady and inc sob      Therapeutic Activities and Exercises:   PT educated patient/niece on POC, role of PT, le rom to do in bed to dec soreness, safety/fall precautions with mobility and d/c recs.     AM-PAC 6 CLICK MOBILITY  Total Score:11     Patient left HOB elevated with all lines intact, call button in reach, RN notified and niece present.    GOALS:   Multidisciplinary Problems     Physical Therapy Goals        Problem: Physical Therapy Goal    Goal Priority Disciplines Outcome Goal Variances Interventions   Physical Therapy Goal     PT, PT/OT      Description:  1. Patient will perform supine to/from sit mod A  2. Patient will perform sit to/from stand with RW mod A  3. Patient will amb 50ft RW mod A                    History:     Past Medical History:   Diagnosis Date    Arthritis     BPH (benign prostatic hyperplasia)     COPD (chronic obstructive pulmonary disease)     Depression     Hyperlipidemia     Hyperlipidemia     Hypertension     Smoker     2 pack a day       Past Surgical History:   Procedure Laterality Date    APPENDECTOMY      APPENDECTOMY      CHOLECYSTECTOMY      CHOLECYSTECTOMY, LAPAROSCOPIC N/A 6/10/2013    Performed by Louis O. [facility]ne IV, MD at [facility] OR    COLONOSCOPY      EYE SURGERY         Time Tracking:     PT Received On: 07/20/19  PT Start Time: 1025     PT Stop Time: 1050  PT Total Time (min): 25 min      Billable Minutes: Evaluation 15 and Therapeutic Activity 10      Bonifacio Martinez, PT  07/20/2019

## 2019-07-20 NOTE — H&P
[facility] Hospital Medicine  History & Physical    Patient Name: Troy Chau  MRN: [id number]  Admission Date: 7/19/2019  Attending Physician: Gary Hillman MD  Primary Care Provider: Tanvir Petersen MD         Patient information was obtained from patient and ER records.     Subjective:     Principal Problem:Acute renal failure due to traumatic rhabdomyolysis    Chief Complaint:   Chief Complaint   Patient presents with    Fall     C/O pain all over, abrasions to right arm and bilateral knees        HPI: 83M h/o HTN, HLD, COPD, and BPH presents s/p mechanical fall.  Incurred multiple abrasions involving both arms, both knees, hip and back, as well as head trauma.  Denies LOC, dizziness, seizure-like activity, muscle weakness, numbness, HA, SOB, CP, ab pain, constipation/diarrhea, recent illness.  In ER, vitals stable.  Imaging of head, both arms, both knees were negative for acute fx or process.  CXR show increase in left lower lobe opacity.   Wbc 26, BUN 29, Cr 2.2, CPK 1000.   ER physician (Justina) gave patient 30ml/kg fluid bolus and zosyn IV bolus with concerned for sepsis.  Because CXR findings more consistent with pleural effusion than pnuemonia, CT chest requested by Hospital Medicine to not only locate a source of infection but also evaluate for pulmonary hemorrhage, hemothorax  s/p fall and trauma.  CT chest statrad reading was negative for any acute findings suggesting hemothorax, pulmonary hemorrhage, significant pleural effusion or pneumonia.   Patient placed in observation for ADRIAN due to traumatic rhabdomyolysis s/p mechanical fall.     Past Medical History:   Diagnosis Date    Arthritis     BPH (benign prostatic hyperplasia)     COPD (chronic obstructive pulmonary disease)     Depression     Hyperlipidemia     Hyperlipidemia     Hypertension     Smoker     2 pack a day       Past Surgical History:   Procedure Laterality Date    APPENDECTOMY      APPENDECTOMY       CHOLECYSTECTOMY      CHOLECYSTECTOMY, LAPAROSCOPIC N/A 6/10/2013    Performed by [facility] at [facility] OR    COLONOSCOPY      EYE SURGERY         Review of patient's allergies indicates:  No Known Allergies    No current facility-administered medications on file prior to encounter.      Current Outpatient Medications on File Prior to Encounter   Medication Sig    albuterol (PROVENTIL) 2.5 mg /3 mL (0.083 %) nebulizer solution Take 2.5 mg by nebulization every 6 (six) hours as needed for Wheezing. Rescue    atorvastatin (LIPITOR) 10 MG tablet Take 10 mg by mouth once daily.    buPROPion (WELLBUTRIN XL) 300 MG 24 hr tablet Take 300 mg by mouth once daily.    furosemide (LASIX) 40 MG tablet Take 40 mg by mouth once daily.    potassium chloride (KLOR-CON) 10 MEQ TbSR Take 10 mEq by mouth once.    amlodipine besylate (AMLODIPINE ORAL) Take 2.5 mg by mouth once daily.     aspirin (ECOTRIN) 81 MG EC tablet Take 81 mg by mouth once daily.    loratadine (CLARITIN ORAL) Take by mouth.    meclizine (ANTIVERT) 25 mg tablet Take 1 tablet (25 mg total) by mouth 3 (three) times daily as needed for Dizziness.    tamsulosin (FLOMAX) 0.4 mg Cp24 Take 0.4 mg by mouth once daily.     Family History     Problem Relation (Age of Onset)    Diabetes Mother    Heart disease Father        Tobacco Use    Smoking status: Current Every Day Smoker     Packs/day: 2.00     Years: 60.00     Pack years: 120.00   Substance and Sexual Activity    Alcohol use: Yes     Comment: not much lately    Drug use: No    Sexual activity: Not on file     Review of Systems   Constitutional: Negative for activity change, appetite change, chills, diaphoresis, fatigue and fever.        +fall   HENT: Negative for facial swelling, sore throat, tinnitus and trouble swallowing.    Eyes: Negative for photophobia and visual disturbance.   Respiratory: Negative for apnea, cough, chest tightness, shortness of breath and wheezing.     Cardiovascular: Negative for chest pain, palpitations and leg swelling.   Gastrointestinal: Negative for abdominal distention, abdominal pain, constipation, diarrhea, nausea and vomiting.   Endocrine: Negative for polydipsia, polyphagia and polyuria.   Genitourinary: Negative for decreased urine volume, dysuria, flank pain, frequency and hematuria.   Musculoskeletal: Positive for arthralgias, back pain and myalgias. Negative for joint swelling and neck stiffness.   Skin: Positive for wound. Negative for pallor and rash.   Allergic/Immunologic: Negative for immunocompromised state.   Neurological: Negative for dizziness, seizures, syncope, weakness, numbness and headaches.   Psychiatric/Behavioral: Negative for confusion, hallucinations and suicidal ideas. The patient is not nervous/anxious.    All other systems reviewed and are negative.    Objective:     Vital Signs (Most Recent):  Temp: 97.9 °F (36.6 °C) (07/19/19 2242)  Pulse: 82 (07/20/19 0400)  Resp: (!) 29 (07/20/19 0400)  BP: 126/60 (07/20/19 0400)  SpO2: 95 % (07/20/19 0400) Vital Signs (24h Range):  Temp:  [97.9 °F (36.6 °C)] 97.9 °F (36.6 °C)  Pulse:  [] 82  Resp:  [22-30] 29  SpO2:  [94 %-96 %] 95 %  BP: (120-136)/(60-70) 126/60     Weight: 83.9 kg (185 lb)  Body mass index is 31.76 kg/m².    Physical Exam   Constitutional: He is oriented to person, place, and time. He appears well-developed and well-nourished. No distress.   HENT:   Head: Normocephalic.   Mouth/Throat: Oropharynx is clear and moist.   Eyes: Pupils are equal, round, and reactive to light. Conjunctivae and EOM are normal. No scleral icterus.   Neck: Normal range of motion. Neck supple. No JVD present. No thyromegaly present.   Cardiovascular: Normal rate and regular rhythm. Exam reveals no gallop and no friction rub.   No murmur heard.  Pulmonary/Chest: Effort normal. No respiratory distress. He has wheezes (diffuse, resolves after deep cough). He has no rales.   Abdominal: Soft.  Bowel sounds are normal. He exhibits no distension. There is no tenderness. There is no guarding.   Musculoskeletal: Normal range of motion.   Neurological: He is alert and oriented to person, place, and time. No cranial nerve deficit.   Skin: Skin is warm. Capillary refill takes less than 2 seconds. He is not diaphoretic. No erythema.   Several abrasions on both arms and knees.    Psychiatric: He has a normal mood and affect.   Nursing note and vitals reviewed.        CRANIAL NERVES     CN III, IV, VI   Pupils are equal, round, and reactive to light.  Extraocular motions are normal.        Significant Labs:   CBC:   Recent Labs   Lab 07/20/19  0013   WBC 26.17*   HGB 13.2*   HCT 39.9*        CMP:   Recent Labs   Lab 07/20/19  0013      K 4.5   CL 98   CO2 20*   *   BUN 29*   CREATININE 2.2*   CALCIUM 10.2   PROT 7.7   ALBUMIN 4.1   BILITOT 0.6   ALKPHOS 133   AST 40   ALT 31   ANIONGAP 18*   EGFRNONAA 27*     Troponin:   Recent Labs   Lab 07/20/19  0013 07/20/19  0246   TROPONINI 0.054* 0.065*     Urine Studies:   Recent Labs   Lab 07/20/19  0315   COLORU Yellow   APPEARANCEUA Clear   PHUR 6.0   SPECGRAV 1.025   PROTEINUA Trace*   GLUCUA Negative   KETONESU Trace*   BILIRUBINUA Negative   OCCULTUA 2+*   NITRITE Negative   UROBILINOGEN 1.0   LEUKOCYTESUR Negative   RBCUA 4     All pertinent labs within the past 24 hours have been reviewed.    Significant Imaging: I have reviewed all pertinent imaging results/findings within the past 24 hours.   Imaging Results          CT Chest Without Contrast (In process)                X-Ray Humerus 2 View Bilateral (In process)                X-Ray Hips Bilateral 2 View Incl AP Pelvis (Preliminary result)  Result time 07/20/19 01:46:31    ED Interpretation by Ran Horn Jr., MD (07/20/19 01:46:31, [facility] Emergency Medicine)    naf                             X-Ray Knee 1 or 2 View Bilateral (Preliminary result)  Result time 07/20/19  01:47:09    ED Interpretation by Ran Horn Jr., MD (07/20/19 01:47:09, [facility] Emergency Medicine)    naf                             X-Ray Chest AP Portable (Preliminary result)  Result time 07/20/19 01:45:27    ED Interpretation by Ran Horn Jr., MD (07/20/19 01:45:27, [facility], Emergency [facility])    LLL pneumonia                             X-Ray Forearm Bilateral (Preliminary result)  Result time 07/20/19 01:47:56    ED Interpretation by Ran Horn Jr., MD (07/20/19 01:47:56, [facility], Emergency Medicine)    naf                             CT Head Without Contrast (In process)                   Assessment/Plan:     * Acute renal failure due to traumatic rhabdomyolysis  - S/p mechanical fall  - CPK 1000, Cr 2.2, baseline Cr 0.8  - Continue IVF hydration  - replete electrolytes PRN  - monitor I/O  - daily renal panel and cpk    Elevated WBC count  - due to stress state s/p fall      Elevated troponin  - due to rhabdomyolysis  - no chest pain, sob, or concerns for ACS       BPH (benign prostatic hyperplasia)  - continue tamsulosin      Essential hypertension  - continue amlodipine 2.5mg daily  - hold lasix due to ADRIAN      COPD (chronic obstructive pulmonary disease)  Current smoker  duonebs prn sob/wheezing      Fall at home  - Imaging negative for fractures or acute process  - fall risk      VTE Risk Mitigation (From admission, onward)        Ordered     heparin (porcine) injection 5,000 Units  Every 8 hours      07/20/19 0449     Place PEDRO LUIS hose  Until discontinued      07/20/19 0449     Place sequential compression device  Until discontinued      07/20/19 0449     IP VTE HIGH RISK PATIENT  Once      07/20/19 0449             Gary Hillman MD  Department of Hospital Medicine   [facility]

## 2019-07-20 NOTE — SIGNIFICANT EVENT
"On 7/19 patient was placed in OBS secondary to ADRIAN associated with traumatic rhabdomyolysis after falling at home.  Cr elevated to 2.2, baseline is around 0.8.  Also noted to have an elevated WBC count of 18k which was thought to be r/t stress reaction as CT chest and UA were both negative for acute pulmonary process.  Patient also afebrile.  Patient also noted to have a mildly elevated troponin of 0.54.  12 lead EKG showed NSR with no significant change.  Patient was given empiric ABX in the ER and started on IVFs.  PT/OT consulted given recent fall and recommended SNF placement.  CM consulted to assist with DC planning.        Called to patient's room this afternoon per primary care nurse for reports of wheezing and tachypnea not relieved with Duoneb treatment.  Upon arrival, patient is AAOx3 and audibly wheezing.  He is in mild respiratory distress with use of abdominal accessory muscles noted.  Tachypnea persists.  SaO2 95% on RA.  Patient's niece who is present at bedside reports current, heavy tobacco abuse and that his PCP "has been having trouble managing his fluid status".  Patient noted to take Lasix 40 mg daily at home, but does not have a documented h/o CHF.  No ECHO available for review.  BNP on admit normal.  Patient was given >2L of IVFs in the ER and started on continuous IVFs for ADRIAN.  Respiratory distress is likely secondary to COPD exacerbation and volume overload.  IVFs stopped and stat Lasix and Solumedrol ordered.  Will also change Duonebs to scheduled q 4 hours.  CXR and ECHO pending.  Repeat troponin pending.  Currently denies CP.  Will place on low flow supplemental O2 and monitor closely.  If status does not improve with Lasix and Solumedrol, patient may need Bipap and transfer to ICU.      Total time spent assessing and stabilizing patient 50 minutes.  "

## 2019-07-20 NOTE — PLAN OF CARE
Problem: Adult Inpatient Plan of Care  Goal: Plan of Care Review  Outcome: Ongoing (interventions implemented as appropriate)  POC reviewed with patient. Pt verbalized understanding  Pt remains free of injuries and falls; fall precaution in place.   Frequent c/o pain throughout shift. Moderate relief with PRN meds.   Q4 breathing tx administered. Audible wheezing through shift.   Turn Q2 with assistance x2.  Bed low, side rails x2, call light in reach, personal belongings at bedside.  Reminded to call for assistance.  Chart check complete. Will continue to monitor.

## 2019-07-20 NOTE — ED NOTES
Care hand off received from DANN Richard. Pt. Laying in bed with family at bedside, updated family regarding POC. Informed waiting on bed assignment. Pt. Repositioned in bed, tolerated well. Will continue to monitor.

## 2019-07-20 NOTE — ED PROVIDER NOTES
SCRIBE #1 NOTE: I, Salma Webster, am scribing for, and in the presence of, Ran Horn Jr., MD. I have scribed the HPI, ROS, and PEx.     SCRIBE #2 NOTE: I, Julia Cooper, am scribing for, and in the presence of,  Mark Brown MD. I have scribed the remaining portions of the note not scribed by Scribe #1.      History     Chief Complaint   Patient presents with    Fall     C/O pain all over, abrasions to right arm and bilateral knees     Review of patient's allergies indicates:  No Known Allergies      History of Present Illness     HPI    7/19/2019, 11:45 PM  History obtained from the patient and family      History of Present Illness: Troy Chau is a 83 y.o. male patient with a PMHx of HTN, HLD, tobacco use, and s/p PABLO and APPY who presents to the Emergency Department for evaluation of a mechanical fall which onset suddenly this evening around 7 PM. Pt reports that he was outside in the yard and suddenly lost balance resulting in a fall. Per family, pt had to crawl from the backyard into the kitchen to call them to help him off the ground. Pt states he injured his RUE, bilateral knees, and hit his head on the fence outside. Negative anticoagulant use other than Aspirin 81 mg QD. Negative LOC. Movement increases pain from injuries. Symptoms are constant and moderate in severity. No mitigating factors reported. Associated sxs include generalized pain, RUE pain, head injury, and abrasions to the RUE and bilateral knees. Patient denies any LOC, fever/ chills, recent illness, generalized weakness, anticoagulant use, localized weakness, numbness/ tingling, speech difficulty, CP, SOB, dizziness, lightheadedness, other injuries, dysuria, hematuria, adominal pain, n/v, and all other sxs at this time. Pt denies any prior tx. No further complaints or concerns at this time.     Arrival mode: EMS     PCP: Tanvir Petersen MD        Past Medical History:  Past Medical History:   Diagnosis Date     Hyperlipidemia     Hypertension     Smoker     2 pack a day       Past Surgical History:  Past Surgical History:   Procedure Laterality Date    APPENDECTOMY      APPENDECTOMY      CHOLECYSTECTOMY      CHOLECYSTECTOMY, LAPAROSCOPIC N/A 6/10/2013    Performed by Louis O. Jeansonne IV, MD at [facility] OR    COLONOSCOPY      EYE SURGERY           Family History:  Family History   Problem Relation Age of Onset    Diabetes Mother     Heart disease Father        Social History:  Social History     Tobacco Use    Smoking status: Current Every Day Smoker     Packs/day: 2.00     Years: 60.00     Pack years: 120.00   Substance and Sexual Activity    Alcohol use: Yes     Comment: not much lately    Drug use: No    Sexual activity: Unknown        Review of Systems     Review of Systems   Constitutional: Negative for chills and fever.        - Generalized weakness   HENT: Negative for congestion and sore throat.    Respiratory: Negative for cough and shortness of breath.    Cardiovascular: Negative for chest pain and palpitations.   Gastrointestinal: Negative for abdominal pain, diarrhea, nausea and vomiting.   Genitourinary: Negative for dysuria and hematuria.   Musculoskeletal: Negative for back pain and neck pain.        + Mechanical fall  + Generalized pain  + Head impact  + RUE pain   Skin: Positive for wound (Abrasions to the bilateral knees and RUE). Negative for rash.   Neurological: Negative for dizziness, speech difficulty, weakness (localized), light-headedness, numbness and headaches.        - LOC   Hematological: Does not bruise/bleed easily.   All other systems reviewed and are negative.     Physical Exam     Initial Vitals [07/19/19 2242]   BP Pulse Resp Temp SpO2   120/70 108 (!) 22 97.9 °F (36.6 °C) (!) 94 %      MAP       --          Physical Exam  Nursing Notes and Vital Signs Reviewed.  Constitutional: Patient is in no acute distress. Well-developed and well-nourished.  Head: Atraumatic.  Normocephalic.  Eyes: PERRL. EOM intact. Conjunctivae are not pale. No scleral icterus.  ENT: Mucous membranes are moist. Oropharynx is clear and symmetric.    Neck: Supple. Full ROM. No lymphadenopathy.  Cardiovascular: Regular rate. Regular rhythm. No murmurs, rubs, or gallops. Distal pulses are 2+ and symmetric.  Pulmonary/Chest: No respiratory distress. No rales. Bilateral inspiratory and expiratory wheezes.  Abdominal: Soft and non-distended.  There is no tenderness.  No rebound, guarding, or rigidity. Good bowel sounds.  Genitourinary: No CVA tenderness  Musculoskeletal: Moves all extremities. No obvious deformities. No edema. No calf tenderness.  Skin: Warm and dry. Superficial abrasions of the bilateral knees and dorsal surface of the right toes. Abrasions to the R elbow and forearm. No active bleeding noted.  Neurological:  Alert, awake, and appropriate.  Normal speech.  Strength is full bilaterally; it is equal and 5/5 in bilateral upper and lower extremities. There is no pronator drift of outstretched arms. Light touch sense is intact. Speech is clear and normal.No acute focal neurological deficits are appreciated. Pt is neurovascularly intact distal to injuries.    Psychiatric: Normal affect. Good eye contact. Appropriate in content.     ED Course   Critical Care  Date/Time: 7/20/2019 1:41 AM  Performed by: Ran Horn Jr., MD  Authorized by: Ran Horn Jr., MD   Direct patient critical care time: 55 minutes  Additional history critical care time: 5 minutes  Ordering / reviewing critical care time: 5 minutes  Consulting other physicians critical care time: 5 minutes  Consult with family critical care time: 5 minutes  Total critical care time (exclusive of procedural time) : 75 minutes  Critical care time was exclusive of separately billable procedures and treating other patients and teaching time.  Critical care was necessary to treat or prevent imminent or life-threatening deterioration of the  "following conditions: sepsis (Elevated troponin).  Critical care was time spent personally by me on the following activities: development of treatment plan with patient or surrogate, interpretation of cardiac output measurements, evaluation of patient's response to treatment, examination of patient, obtaining history from patient or surrogate, ordering and performing treatments and interventions, ordering and review of laboratory studies, ordering and review of radiographic studies, re-evaluation of patient's condition and review of old charts.        ED Vital Signs:  Vitals:    07/19/19 2242 07/20/19 0014 07/20/19 0200 07/20/19 0330   BP: 120/70   136/63   Pulse: 108 92 91 83   Resp: (!) 22 (!) 30 (!) 25 (!) 28   Temp: 97.9 °F (36.6 °C)      TempSrc: Oral      SpO2: (!) 94%  96% 95%   Weight: 83.9 kg (185 lb)      Height: 5' 4" (1.626 m)       07/20/19 0400   BP: 126/60   Pulse: 82   Resp: (!) 29   Temp:    TempSrc:    SpO2: 95%   Weight:    Height:        Abnormal Lab Results:  Labs Reviewed   CBC W/ AUTO DIFFERENTIAL - Abnormal; Notable for the following components:       Result Value    WBC 26.17 (*)     RBC 4.58 (*)     Hemoglobin 13.2 (*)     Hematocrit 39.9 (*)     RDW 15.2 (*)     Gran # (ANC) 23.1 (*)     Gran% 88.9 (*)     Lymph% 7.5 (*)     Mono% 3.9 (*)     All other components within normal limits   COMPREHENSIVE METABOLIC PANEL - Abnormal; Notable for the following components:    CO2 20 (*)     Glucose 185 (*)     BUN, Bld 29 (*)     Creatinine 2.2 (*)     Anion Gap 18 (*)     eGFR if  31 (*)     eGFR if non  27 (*)     All other components within normal limits   TROPONIN I - Abnormal; Notable for the following components:    Troponin I 0.054 (*)     All other components within normal limits   URINALYSIS, REFLEX TO URINE CULTURE - Abnormal; Notable for the following components:    Protein, UA Trace (*)     Ketones, UA Trace (*)     Occult Blood UA 2+ (*)     All other " components within normal limits    Narrative:     Preferred Collection Type->Urine, Clean Catch   PROCALCITONIN - Abnormal; Notable for the following components:    Procalcitonin 1.47 (*)     All other components within normal limits   TROPONIN I - Abnormal; Notable for the following components:    Troponin I 0.065 (*)     All other components within normal limits   CK-MB - Abnormal; Notable for the following components:    CPK 1000 (*)     CPK MB 15.2 (*)     All other components within normal limits   PHOSPHORUS - Abnormal; Notable for the following components:    Phosphorus 2.6 (*)     All other components within normal limits   CULTURE, BLOOD   CULTURE, BLOOD   B-TYPE NATRIURETIC PEPTIDE   CK-MB   CK   LACTIC ACID, PLASMA   PROTIME-INR   LIPASE   MAGNESIUM   PHOSPHORUS   LIPASE   MAGNESIUM   URINALYSIS MICROSCOPIC    Narrative:     Preferred Collection Type->Urine, Clean Catch   CBC W/ AUTO DIFFERENTIAL   RENAL FUNCTION PANEL   MAGNESIUM   PHOSPHORUS        All Lab Results:  Results for orders placed or performed during the hospital encounter of 07/19/19   CBC auto differential   Result Value Ref Range    WBC 26.17 (H) 3.90 - 12.70 K/uL    RBC 4.58 (L) 4.60 - 6.20 M/uL    Hemoglobin 13.2 (L) 14.0 - 18.0 g/dL    Hematocrit 39.9 (L) 40.0 - 54.0 %    Mean Corpuscular Volume 87 82 - 98 fL    Mean Corpuscular Hemoglobin 28.8 27.0 - 31.0 pg    Mean Corpuscular Hemoglobin Conc 33.1 32.0 - 36.0 g/dL    RDW 15.2 (H) 11.5 - 14.5 %    Platelets 271 150 - 350 K/uL    MPV 9.3 9.2 - 12.9 fL    Gran # (ANC) 23.1 (H) 1.8 - 7.7 K/uL    Lymph # 2.0 1.0 - 4.8 K/uL    Mono # 1.0 0.3 - 1.0 K/uL    Eos # 0.0 0.0 - 0.5 K/uL    Baso # 0.02 0.00 - 0.20 K/uL    Gran% 88.9 (H) 38.0 - 73.0 %    Lymph% 7.5 (L) 18.0 - 48.0 %    Mono% 3.9 (L) 4.0 - 15.0 %    Eosinophil% 0.1 0.0 - 8.0 %    Basophil% 0.1 0.0 - 1.9 %    Differential Method Automated    Comprehensive metabolic panel   Result Value Ref Range    Sodium 136 136 - 145 mmol/L     Potassium 4.5 3.5 - 5.1 mmol/L    Chloride 98 95 - 110 mmol/L    CO2 20 (L) 23 - 29 mmol/L    Glucose 185 (H) 70 - 110 mg/dL    BUN, Bld 29 (H) 8 - 23 mg/dL    Creatinine 2.2 (H) 0.5 - 1.4 mg/dL    Calcium 10.2 8.7 - 10.5 mg/dL    Total Protein 7.7 6.0 - 8.4 g/dL    Albumin 4.1 3.5 - 5.2 g/dL    Total Bilirubin 0.6 0.1 - 1.0 mg/dL    Alkaline Phosphatase 133 55 - 135 U/L    AST 40 10 - 40 U/L    ALT 31 10 - 44 U/L    Anion Gap 18 (H) 8 - 16 mmol/L    eGFR if African American 31 (A) >60 mL/min/1.73 m^2    eGFR if non African American 27 (A) >60 mL/min/1.73 m^2   Troponin I #1   Result Value Ref Range    Troponin I 0.054 (H) 0.000 - 0.026 ng/mL   B-Type natriuretic peptide (BNP)   Result Value Ref Range    BNP 20 0 - 99 pg/mL   Lactic acid, plasma #1   Result Value Ref Range    Lactate (Lactic Acid) 2.2 0.5 - 2.2 mmol/L   Urinalysis, Reflex to Urine Culture Urine, Clean Catch   Result Value Ref Range    Specimen UA Urine, Catheterized     Color, UA Yellow Yellow, Straw, Nohemi    Appearance, UA Clear Clear    pH, UA 6.0 5.0 - 8.0    Specific Gravity, UA 1.025 1.005 - 1.030    Protein, UA Trace (A) Negative    Glucose, UA Negative Negative    Ketones, UA Trace (A) Negative    Bilirubin (UA) Negative Negative    Occult Blood UA 2+ (A) Negative    Nitrite, UA Negative Negative    Urobilinogen, UA 1.0 <2.0 EU/dL    Leukocytes, UA Negative Negative   Protime-INR   Result Value Ref Range    Prothrombin Time 10.8 9.0 - 12.5 sec    INR 1.0 0.8 - 1.2   Procalcitonin   Result Value Ref Range    Procalcitonin 1.47 (H) <0.25 ng/mL   Troponin I #2   Result Value Ref Range    Troponin I 0.065 (H) 0.000 - 0.026 ng/mL   CK-MB   Result Value Ref Range    CPK 1000 (H) 20 - 200 U/L    CPK MB 15.2 (H) 0.1 - 6.5 ng/mL    MB% 1.5 0.0 - 5.0 %   Phosphorus   Result Value Ref Range    Phosphorus 2.6 (L) 2.7 - 4.5 mg/dL   Lipase   Result Value Ref Range    Lipase 20 4 - 60 U/L   Magnesium   Result Value Ref Range    Magnesium 1.9 1.6 - 2.6  mg/dL   Urinalysis Microscopic   Result Value Ref Range    RBC, UA 4 0 - 4 /hpf    Microscopic Comment SEE COMMENT          Imaging Results:  Imaging Results          CT Chest Without Contrast (In process)                X-Ray Humerus 2 View Bilateral (In process)                X-Ray Hips Bilateral 2 View Incl AP Pelvis (Preliminary result)  Result time 07/20/19 01:46:31    ED Interpretation by Ran Horn Jr., MD (07/20/19 01:46:31, [facility])    naf                             X-Ray Knee 1 or 2 View Bilateral (Preliminary result)  Result time 07/20/19 01:47:09    ED Interpretation by Ran Horn Jr., MD (07/20/19 01:47:09, [facility])    naf                             X-Ray Chest AP Portable (Preliminary result)  Result time 07/20/19 01:45:27    ED Interpretation by Ran Horn Jr., MD (07/20/19 01:45:27, [facility])    LLL pneumonia                             X-Ray Forearm Bilateral (Preliminary result)  Result time 07/20/19 01:47:56    ED Interpretation by Ran Horn Jr., MD (07/20/19 01:47:56, [facility])    naf                             CT Head Without Contrast (In process)                1:00 AM: CT Head:  Report reviewed by me.  Radiologist: Dr. Maury Faust  IMPRESSION: No acute intracranial hemorrhage or skull fracture.    The EKG was ordered, reviewed, and independently interpreted by the ED provider.  Interpretation time: 23:55  Rate: 94 BPM  Rhythm: normal sinus rhythm  Interpretation: Left anterior fascicular block. Cannot rule out Anterior infarct, age undetermined. Abnormal ECG.. No STEMI.           The Emergency Provider reviewed the vital signs and test results, which are outlined above.     ED Discussion     12:30 AM: After review of lab work, there is a concern for sepsis. Will initiate sepsis workup. 30mL/kg IVF have been administered  within 3 hours of identification of SIRS criteria. Vital signs were reviewed and a focal sepsis perfusion assessment was performed.    1:54 AM: Re-evaluated pt. I have discussed test results, shared treatment plan, and the need for admission with patient and family at bedside. Pt and family express understanding at this time and agree with all information. All questions answered. Pt and family have no further questions or concerns at this time. Pt is ready for admit.    1:54 AM: Discussed case with Gary Hillman MD ([facility]) who states that the pt needs a CT of the chest.    2:06 AM: Dr. Horn transfers care of pt to Dr. Mark Brown pending lab/ radiology results.    4:49 AM: Discussed case with Gary Hillman ([facility]). Dr. Hillman agrees with current care and management of pt and accepts admission.   Admitting Service: [facility]  Admitting Physician: Gary Hillman  Admit to: Obs            ED Medication(s):  Medications   0.9%  NaCl infusion (has no administration in time range)   sodium chloride 0.9% flush 5 mL (has no administration in time range)   glucose chewable tablet 16 g (has no administration in time range)   glucose chewable tablet 24 g (has no administration in time range)   dextrose 50% injection 12.5 g (has no administration in time range)   dextrose 50% injection 25 g (has no administration in time range)   glucagon (human recombinant) injection 1 mg (has no administration in time range)   heparin (porcine) injection 5,000 Units (has no administration in time range)   ondansetron injection 4 mg (has no administration in time range)   promethazine (PHENERGAN) 6.25 mg in dextrose 5 % 50 mL IVPB (has no administration in time range)   amLODIPine tablet 2.5 mg (has no administration in time range)   aspirin EC tablet 81 mg (has no administration in time range)   atorvastatin tablet 10 mg (has no administration in time range)   buPROPion TB24 tablet 300 mg (has no  administration in time range)   tamsulosin 24 hr capsule 0.4 mg (has no administration in time range)   acetaminophen tablet 1,000 mg (1,000 mg Oral Given 7/20/19 0135)   albuterol-ipratropium 2.5 mg-0.5 mg/3 mL nebulizer solution 3 mL (3 mLs Nebulization Given 7/20/19 0014)   sodium chloride 0.9% bolus 2,517 mL (0 mL/kg × 83.9 kg Intravenous Stopped 7/20/19 0343)   piperacillin-tazobactam 3.375 g in dextrose 5 % 50 mL IVPB (ready to mix system) (0 g Intravenous Stopped 7/20/19 0340)       New Prescriptions    No medications on file                 Medical Decision Making:   Clinical Tests:   Lab Tests: Ordered and Reviewed  Radiological Study: Ordered and Reviewed  Medical Tests: Ordered and Reviewed             Scribe Attestation:   Scribe #1: I performed the above scribed service and the documentation accurately describes the services I performed. I attest to the accuracy of the note.     Attending:   Physician Attestation Statement for Scribe #1: I, Ran Horn Jr., MD, personally performed the services described in this documentation, as scribed by Salma Webster, in my presence, and it is both accurate and complete.       Scribe Attestation:   Scribe #2: I performed the above scribed service and the documentation accurately describes the services I performed. I attest to the accuracy of the note.    Attending Attestation:           Physician Attestation for Scribe:    Physician Attestation Statement for Scribe #2: I, Mark Brown MD, reviewed documentation, as scribed by Julia Cooper in my presence, and it is both accurate and complete. I also acknowledge and confirm the content of the note done by Scribe #1.           Clinical Impression       ICD-10-CM ICD-9-CM   1. ADRIAN (acute kidney injury) N17.9 584.9   2. Wheezing R06.2 786.07   3. Abrasion of knee, bilateral S80.211A 916.0    S80.212A    4. Bilateral hip pain M25.551 719.45    M25.552    5. Fall W19.XXXA E888.9   6. Traumatic  rhabdomyolysis, initial encounter T79.6XXA 958.6   7. Pneumonia of left lower lobe due to infectious organism J18.1 486       Disposition:   Disposition: Placed in Observation  Condition: Olga Horn Jr., MD  07/21/19 0148

## 2019-07-20 NOTE — HOSPITAL COURSE
Mr Chau is a 83 year old male who was placed in Observation secondary to ADRIAN associated with traumatic rhabdomyolysis after falling at home.  Cr elevated to 2.2, baseline is around 0.8.  Also noted to have an elevated WBC count of 18k which was thought to be r/t stress reaction as CT chest and UA were both negative for acute pulmonary process, he was also afebrile.  Patient also noted to have a mildly elevated troponin of 0.54.  12 lead EKG showed NSR with no significant change.  Patient was given empiric ABX in the ER and started on IVFs.  PT/OT consulted given recent fall and recommended SNF placement.  CM consulted to assist with DC planning.  As of 7/21, patient was initially difficult to arouse after receiving Ativan. O 2 sats and ABG were stable. He later wore up and became somewhat combative, however later was calm and stable with family member at bedside. ADRIAN has resolved with IVF. Pt continued to experiencing wheezing after being on SUZIE, LABA, ICS and IV steroids. As of 7/22, patient more awake and alerts, sitting up in chair. Continue to experiencing wheezing and rhonchi, O 2 at 2-3 L.  Pulmonary consulted for further evaluation. Vital signs stable. Renal function stable, CK trending downward. Awaiting SNF placement. As of today, patient feeling well, wheezing has resolved. Pt having some Rt shoulder pain, Rt shoulder X ray stable. Will follow up with Orthopedic Surgery as outpatient. Patient seen, examined and deemed suitable for discharge to [facility].

## 2019-07-20 NOTE — PT/OT/SLP EVAL
Occupational Therapy   Evaluation    Name: Troy Chau  MRN: [id number]  Admitting Diagnosis:  Acute renal failure due to traumatic rhabdomyolysis      Recommendations:     Discharge Recommendations: nursing facility, skilled  Discharge Equipment Recommendations:  walker, rolling, commode  Barriers to discharge:       Assessment:     Troy Chau is a 83 y.o. male with a medical diagnosis of Acute renal failure due to traumatic rhabdomyolysis.  He presents with impaired functional mobility and ADLs. Performance deficits affecting function: weakness, impaired endurance, impaired self care skills, impaired functional mobilty, decreased coordination, edema, pain, decreased safety awareness, decreased lower extremity function, impaired balance, gait instability, decreased upper extremity function, decreased ROM.      Rehab Prognosis: Fair; patient would benefit from acute skilled OT services to address these deficits and reach maximum level of function.       Plan:     Patient to be seen 3 x/week to address the above listed problems via self-care/home management, therapeutic activities, therapeutic exercises  · Plan of Care Expires: 07/20/19  · Plan of Care Reviewed with: patient, family    Subjective     Chief Complaint: weakness  Patient/Family Comments/goals: to get stronger    Occupational Profile:  Living Environment: lives alone in 1 story house with no steps; niece comes to help him  Previous level of function: Mod (I) with ADLs; Ambulates holding onto furniture  Equipment Used at Home:  cane, straight  Assistance upon Discharge: Niece    Pain/Comfort:  · Pain Rating 1: 7/10  · Location - Side 1: Bilateral  · Location - Orientation 1: generalized  · Location 1: (all over)  · Pain Addressed 1: Reposition, Distraction, Cessation of Activity    Patients cultural, spiritual, [employer] conflicts given the current situation: no    Objective:     Communicated with: Nurse Cisse and epic chart review prior to session.   Patient found supine with peripheral IV, telemetry upon OT entry to room.    General Precautions: Standard, fall, respiratory, hearing impaired   Orthopedic Precautions:N/A   Braces: N/A     Occupational Performance:    Bed Mobility:    · Patient completed Rolling/Turning to Left with  maximal assistance and 2 persons  · Patient completed Rolling/Turning to Right with maximal assistance and 2 persons  · Patient completed Scooting/Bridging with maximal assistance and 2 persons  · Patient completed Supine to Sit with maximal assistance and 2 persons  · Patient completed Sit to Supine with maximal assistance and 2 persons    Functional Mobility/Transfers:  · Patient completed Sit <> Stand Transfer with maximal assistance and of 2 persons  with  hand-held assist   · Functional Mobility: 2 side steps with Max A x 2    Activities of Daily Living:  · Upper Body Dressing: maximal assistance .  · Lower Body Dressing: maximal assistance .    Cognitive/Visual Perceptual:  Cognitive/Psychosocial Skills:     -       Oriented to: Person, Place, Time and Situation   -       Follows Commands/attention:Hard of hearing  -       Communication: clear/fluent  -       Memory: No Deficits noted  -       Safety awareness/insight to disability: impaired   Visual/Perceptual:      -Impaired  legally blind /    Physical Exam:  Dominant hand:    -       right  Upper Extremity Range of Motion:     -       Right Upper Extremity: Deficits: shoulder ~90degrees PROM; rest of UE WFL  -       Left Upper Extremity: WFL  Upper Extremity Strength:    -       Right Upper Extremity: 2/5 grossly\  -       Left Upper Extremity: 3+/5grossly   Strength:    -       Right Upper Extremity: WFL  -       Left Upper Extremity: WFL    AMPAC 6 Click ADL:  AMPAC Total Score: 14    Treatment & Education:  OT eval completed as listed above. Pt educated in role of OT and POC.   Education:    Patient left HOB elevated with all lines intact, call button in reach and  Nurse Tanna notified, family member in room.     GOALS:   Multidisciplinary Problems     Occupational Therapy Goals        Problem: Occupational Therapy Goal    Goal Priority Disciplines Outcome Interventions   Occupational Therapy Goal     OT, PT/OT     Description:  LTGs to be met by 7/27/19  1. Pt will perform BSC t/fs with Mod A  2. Pt will perform LE dressing with Mod A  3. Pt will perform UE dressing with Mod A  4. Pt will perform (B( UE ROM exercises 1 x 15 reps                    History:     Past Medical History:   Diagnosis Date    Arthritis     BPH (benign prostatic hyperplasia)     COPD (chronic obstructive pulmonary disease)     Depression     Hyperlipidemia     Hyperlipidemia     Hypertension     Smoker     2 pack a day       Past Surgical History:   Procedure Laterality Date    APPENDECTOMY      APPENDECTOMY      CHOLECYSTECTOMY      CHOLECYSTECTOMY, LAPAROSCOPIC N/A 6/10/2013    Performed by Louis O. Jeansonne IV, [facility] at [facility] OR    COLONOSCOPY      EYE SURGERY         Time Tracking:     OT Date of Treatment: 07/20/19  OT Start Time: 1030  OT Stop Time: 1055  OT Total Time (min): 25 min    Billable Minutes:Evaluation 15 min  Therapeutic Activity 10 min    Stefanie Jacob, PT/OT  7/20/2019

## 2019-07-20 NOTE — ASSESSMENT & PLAN NOTE
- S/p mechanical fall  - CPK 1000, Cr 2.2, baseline Cr 0.8  - Continue IVF hydration  - replete electrolytes PRN  - monitor I/O  - daily renal panel and cpk

## 2019-07-20 NOTE — PROGRESS NOTES
Rt called to pt room for prn neb due to wheezing. Upon rt arrival pt noted to have moderate abdominal muscle use and purse lipped breathing. Room air spo2 96% and BBS diminished with expiratory wheeze. Neb administered. Post neb BBS coarse,audible expiratory wheeze noted throughout. Pt still has moderate abdominal muscle use. THERESE Castro,NP notified and came to bedside. Rt gave additional neb tx and frequency of duoneb increased to Q4H. Pt continues to have mild/moderate abdominal muscle use and coarse, audible expiratory wheeze.  2 lpm nc applied to pt. NP and nurse remain at bedside. Rt instructed patient, family, and nurse to call if increased WOB and wheezing continue/ worsen. Rt to continue to monitor.

## 2019-07-20 NOTE — NURSING
Patient is tachypnic, experiencing increased difficulty breathing and audible wheezing, and c/o 9/10 generalized aching pain throughout body. Patient is AAOx4. SpO2 95% on RA with 34 breaths per minute.. Contacted respiratory therapy and Nessa Castro NP.   Awaiting orders.

## 2019-07-20 NOTE — SIGNIFICANT EVENT
"Patient urinated with IV Lasix, but unable to measure.  Respiratory status has markedly improved with Lasix and Solumedrol.  Wheezing persists, but has improved greatly.  Will repeat Duoneb treatment x 1 and re-assess.  Comfortable on NC and reports "I'm feeling better".  Will continue to monitor closely.  "

## 2019-07-20 NOTE — SUBJECTIVE & OBJECTIVE
Past Medical History:   Diagnosis Date    Arthritis     BPH (benign prostatic hyperplasia)     COPD (chronic obstructive pulmonary disease)     Depression     Hyperlipidemia     Hyperlipidemia     Hypertension     Smoker     2 pack a day       Past Surgical History:   Procedure Laterality Date    APPENDECTOMY      APPENDECTOMY      CHOLECYSTECTOMY      CHOLECYSTECTOMY, LAPAROSCOPIC N/A 6/10/2013    Performed by Louis O. Jeansonne IV[facility] at [facility] OR    COLONOSCOPY      EYE SURGERY         Review of patient's allergies indicates:  No Known Allergies    No current facility-administered medications on file prior to encounter.      Current Outpatient Medications on File Prior to Encounter   Medication Sig    albuterol (PROVENTIL) 2.5 mg /3 mL (0.083 %) nebulizer solution Take 2.5 mg by nebulization every 6 (six) hours as needed for Wheezing. Rescue    atorvastatin (LIPITOR) 10 MG tablet Take 10 mg by mouth once daily.    buPROPion (WELLBUTRIN XL) 300 MG 24 hr tablet Take 300 mg by mouth once daily.    furosemide (LASIX) 40 MG tablet Take 40 mg by mouth once daily.    potassium chloride (KLOR-CON) 10 MEQ TbSR Take 10 mEq by mouth once.    amlodipine besylate (AMLODIPINE ORAL) Take 2.5 mg by mouth once daily.     aspirin (ECOTRIN) 81 MG EC tablet Take 81 mg by mouth once daily.    loratadine (CLARITIN ORAL) Take by mouth.    meclizine (ANTIVERT) 25 mg tablet Take 1 tablet (25 mg total) by mouth 3 (three) times daily as needed for Dizziness.    tamsulosin (FLOMAX) 0.4 mg Cp24 Take 0.4 mg by mouth once daily.     Family History     Problem Relation (Age of Onset)    Diabetes Mother    Heart disease Father        Tobacco Use    Smoking status: Current Every Day Smoker     Packs/day: 2.00     Years: 60.00     Pack years: 120.00   Substance and Sexual Activity    Alcohol use: Yes     Comment: not much lately    Drug use: No    Sexual activity: Not on file     Review of Systems   Constitutional:  Negative for activity change, appetite change, chills, diaphoresis, fatigue and fever.        +fall   HENT: Negative for facial swelling, sore throat, tinnitus and trouble swallowing.    Eyes: Negative for photophobia and visual disturbance.   Respiratory: Negative for apnea, cough, chest tightness, shortness of breath and wheezing.    Cardiovascular: Negative for chest pain, palpitations and leg swelling.   Gastrointestinal: Negative for abdominal distention, abdominal pain, constipation, diarrhea, nausea and vomiting.   Endocrine: Negative for polydipsia, polyphagia and polyuria.   Genitourinary: Negative for decreased urine volume, dysuria, flank pain, frequency and hematuria.   Musculoskeletal: Positive for arthralgias, back pain and myalgias. Negative for joint swelling and neck stiffness.   Skin: Positive for wound. Negative for pallor and rash.   Allergic/Immunologic: Negative for immunocompromised state.   Neurological: Negative for dizziness, seizures, syncope, weakness, numbness and headaches.   Psychiatric/Behavioral: Negative for confusion, hallucinations and suicidal ideas. The patient is not nervous/anxious.    All other systems reviewed and are negative.    Objective:     Vital Signs (Most Recent):  Temp: 97.9 °F (36.6 °C) (07/19/19 2242)  Pulse: 82 (07/20/19 0400)  Resp: (!) 29 (07/20/19 0400)  BP: 126/60 (07/20/19 0400)  SpO2: 95 % (07/20/19 0400) Vital Signs (24h Range):  Temp:  [97.9 °F (36.6 °C)] 97.9 °F (36.6 °C)  Pulse:  [] 82  Resp:  [22-30] 29  SpO2:  [94 %-96 %] 95 %  BP: (120-136)/(60-70) 126/60     Weight: 83.9 kg (185 lb)  Body mass index is 31.76 kg/m².    Physical Exam   Constitutional: He is oriented to person, place, and time. He appears well-developed and well-nourished. No distress.   HENT:   Head: Normocephalic.   Mouth/Throat: Oropharynx is clear and moist.   Eyes: Pupils are equal, round, and reactive to light. Conjunctivae and EOM are normal. No scleral icterus.   Neck:  Normal range of motion. Neck supple. No JVD present. No thyromegaly present.   Cardiovascular: Normal rate and regular rhythm. Exam reveals no gallop and no friction rub.   No murmur heard.  Pulmonary/Chest: Effort normal. No respiratory distress. He has wheezes (diffuse, resolves after deep cough). He has no rales.   Abdominal: Soft. Bowel sounds are normal. He exhibits no distension. There is no tenderness. There is no guarding.   Musculoskeletal: Normal range of motion.   Neurological: He is alert and oriented to person, place, and time. No cranial nerve deficit.   Skin: Skin is warm. Capillary refill takes less than 2 seconds. He is not diaphoretic. No erythema.   Several abrasions on both arms and knees.    Psychiatric: He has a normal mood and affect.   Nursing note and vitals reviewed.        CRANIAL NERVES     CN III, IV, VI   Pupils are equal, round, and reactive to light.  Extraocular motions are normal.        Significant Labs:   CBC:   Recent Labs   Lab 07/20/19  0013   WBC 26.17*   HGB 13.2*   HCT 39.9*        CMP:   Recent Labs   Lab 07/20/19  0013      K 4.5   CL 98   CO2 20*   *   BUN 29*   CREATININE 2.2*   CALCIUM 10.2   PROT 7.7   ALBUMIN 4.1   BILITOT 0.6   ALKPHOS 133   AST 40   ALT 31   ANIONGAP 18*   EGFRNONAA 27*     Troponin:   Recent Labs   Lab 07/20/19  0013 07/20/19  0246   TROPONINI 0.054* 0.065*     Urine Studies:   Recent Labs   Lab 07/20/19  0315   COLORU Yellow   APPEARANCEUA Clear   PHUR 6.0   SPECGRAV 1.025   PROTEINUA Trace*   GLUCUA Negative   KETONESU Trace*   BILIRUBINUA Negative   OCCULTUA 2+*   NITRITE Negative   UROBILINOGEN 1.0   LEUKOCYTESUR Negative   RBCUA 4     All pertinent labs within the past 24 hours have been reviewed.    Significant Imaging: I have reviewed all pertinent imaging results/findings within the past 24 hours.   Imaging Results          CT Chest Without Contrast (In process)                X-Ray Humerus 2 View Bilateral (In process)                 X-Ray Hips Bilateral 2 View Incl AP Pelvis (Preliminary result)  Result time 07/20/19 01:46:31    ED Interpretation by Ran Horn Jr., MD (07/20/19 01:46:31, [facility])    naf                             X-Ray Knee 1 or 2 View Bilateral (Preliminary result)  Result time 07/20/19 01:47:09    ED Interpretation by Ran Horn Jr., MD (07/20/19 01:47:09, [facility])    naf                             X-Ray Chest AP Portable (Preliminary result)  Result time 07/20/19 01:45:27    ED Interpretation by Ran Horn Jr., MD (07/20/19 01:45:27, [facility])    LLL pneumonia                             X-Ray Forearm Bilateral (Preliminary result)  Result time 07/20/19 01:47:56    ED Interpretation by Ran Horn Jr., MD (07/20/19 01:47:56, [facility])    naf                             CT Head Without Contrast (In process)

## 2019-07-21 LAB
ALBUMIN SERPL BCP-MCNC: 3.1 G/DL (ref 3.5–5.2)
ALBUMIN SERPL BCP-MCNC: 3.1 G/DL (ref 3.5–5.2)
ALLENS TEST: ABNORMAL
ALP SERPL-CCNC: 98 U/L (ref 55–135)
ALT SERPL W/O P-5'-P-CCNC: 29 U/L (ref 10–44)
AMPHET+METHAMPHET UR QL: NEGATIVE
ANION GAP SERPL CALC-SCNC: 12 MMOL/L (ref 8–16)
ANION GAP SERPL CALC-SCNC: 12 MMOL/L (ref 8–16)
AST SERPL-CCNC: 50 U/L (ref 10–40)
BARBITURATES UR QL SCN>200 NG/ML: NEGATIVE
BASOPHILS # BLD AUTO: 0.01 K/UL (ref 0–0.2)
BASOPHILS NFR BLD: 0.1 % (ref 0–1.9)
BENZODIAZ UR QL SCN>200 NG/ML: NEGATIVE
BILIRUB SERPL-MCNC: 0.5 MG/DL (ref 0.1–1)
BUN SERPL-MCNC: 22 MG/DL (ref 8–23)
BUN SERPL-MCNC: 22 MG/DL (ref 8–23)
BZE UR QL SCN: NEGATIVE
CALCIUM SERPL-MCNC: 8.8 MG/DL (ref 8.7–10.5)
CALCIUM SERPL-MCNC: 8.8 MG/DL (ref 8.7–10.5)
CANNABINOIDS UR QL SCN: NEGATIVE
CHLORIDE SERPL-SCNC: 101 MMOL/L (ref 95–110)
CHLORIDE SERPL-SCNC: 101 MMOL/L (ref 95–110)
CK SERPL-CCNC: 1736 U/L (ref 20–200)
CO2 SERPL-SCNC: 20 MMOL/L (ref 23–29)
CO2 SERPL-SCNC: 20 MMOL/L (ref 23–29)
CREAT SERPL-MCNC: 1 MG/DL (ref 0.5–1.4)
CREAT SERPL-MCNC: 1 MG/DL (ref 0.5–1.4)
CREAT UR-MCNC: 189 MG/DL (ref 23–375)
DELSYS: ABNORMAL
DIASTOLIC DYSFUNCTION: NO
DIFFERENTIAL METHOD: ABNORMAL
EOSINOPHIL # BLD AUTO: 0 K/UL (ref 0–0.5)
EOSINOPHIL NFR BLD: 0 % (ref 0–8)
ERYTHROCYTE [DISTWIDTH] IN BLOOD BY AUTOMATED COUNT: 15.5 % (ref 11.5–14.5)
EST. GFR  (AFRICAN AMERICAN): >60 ML/MIN/1.73 M^2
EST. GFR  (AFRICAN AMERICAN): >60 ML/MIN/1.73 M^2
EST. GFR  (NON AFRICAN AMERICAN): >60 ML/MIN/1.73 M^2
EST. GFR  (NON AFRICAN AMERICAN): >60 ML/MIN/1.73 M^2
ETHANOL UR-MCNC: <10 MG/DL
FIO2: 32
FLOW: 3
GLUCOSE SERPL-MCNC: 246 MG/DL (ref 70–110)
GLUCOSE SERPL-MCNC: 246 MG/DL (ref 70–110)
HCO3 UR-SCNC: 22.6 MMOL/L (ref 24–28)
HCT VFR BLD AUTO: 36.8 % (ref 40–54)
HGB BLD-MCNC: 12 G/DL (ref 14–18)
LYMPHOCYTES # BLD AUTO: 0.5 K/UL (ref 1–4.8)
LYMPHOCYTES NFR BLD: 3.2 % (ref 18–48)
MAGNESIUM SERPL-MCNC: 2 MG/DL (ref 1.6–2.6)
MCH RBC QN AUTO: 28.6 PG (ref 27–31)
MCHC RBC AUTO-ENTMCNC: 32.6 G/DL (ref 32–36)
MCV RBC AUTO: 88 FL (ref 82–98)
METHADONE UR QL SCN>300 NG/ML: NEGATIVE
MODE: ABNORMAL
MONOCYTES # BLD AUTO: 0.6 K/UL (ref 0.3–1)
MONOCYTES NFR BLD: 3.5 % (ref 4–15)
NEUTROPHILS # BLD AUTO: 15.7 K/UL (ref 1.8–7.7)
NEUTROPHILS NFR BLD: 93.6 % (ref 38–73)
OPIATES UR QL SCN: NEGATIVE
PCO2 BLDA: 41.9 MMHG (ref 35–45)
PCP UR QL SCN>25 NG/ML: NEGATIVE
PH SMN: 7.34 [PH] (ref 7.35–7.45)
PHOSPHATE SERPL-MCNC: 2.9 MG/DL (ref 2.7–4.5)
PLATELET # BLD AUTO: 221 K/UL (ref 150–350)
PMV BLD AUTO: 9.6 FL (ref 9.2–12.9)
PO2 BLDA: 74 MMHG (ref 80–100)
POC BE: -3 MMOL/L
POC SATURATED O2: 94 % (ref 95–100)
POCT GLUCOSE: 189 MG/DL (ref 70–110)
POTASSIUM SERPL-SCNC: 3.9 MMOL/L (ref 3.5–5.1)
POTASSIUM SERPL-SCNC: 3.9 MMOL/L (ref 3.5–5.1)
PROT SERPL-MCNC: 6.7 G/DL (ref 6–8.4)
RBC # BLD AUTO: 4.19 M/UL (ref 4.6–6.2)
RETIRED EF AND QEF - SEE NOTES: 60 (ref 55–65)
SAMPLE: ABNORMAL
SITE: ABNORMAL
SODIUM SERPL-SCNC: 133 MMOL/L (ref 136–145)
SODIUM SERPL-SCNC: 133 MMOL/L (ref 136–145)
TOXICOLOGY INFORMATION: NORMAL
WBC # BLD AUTO: 16.78 K/UL (ref 3.9–12.7)

## 2019-07-21 PROCEDURE — 85025 COMPLETE CBC W/AUTO DIFF WBC: CPT

## 2019-07-21 PROCEDURE — 99900035 HC TECH TIME PER 15 MIN (STAT)

## 2019-07-21 PROCEDURE — 63600175 PHARM REV CODE 636 W HCPCS: Performed by: HOSPITALIST

## 2019-07-21 PROCEDURE — 80053 COMPREHEN METABOLIC PANEL: CPT

## 2019-07-21 PROCEDURE — G0378 HOSPITAL OBSERVATION PER HR: HCPCS

## 2019-07-21 PROCEDURE — 94761 N-INVAS EAR/PLS OXIMETRY MLT: CPT

## 2019-07-21 PROCEDURE — 25000242 PHARM REV CODE 250 ALT 637 W/ HCPCS: Performed by: NURSE PRACTITIONER

## 2019-07-21 PROCEDURE — 96361 HYDRATE IV INFUSION ADD-ON: CPT

## 2019-07-21 PROCEDURE — 36600 WITHDRAWAL OF ARTERIAL BLOOD: CPT

## 2019-07-21 PROCEDURE — 63600175 PHARM REV CODE 636 W HCPCS: Performed by: NURSE PRACTITIONER

## 2019-07-21 PROCEDURE — 96372 THER/PROPH/DIAG INJ SC/IM: CPT

## 2019-07-21 PROCEDURE — 84100 ASSAY OF PHOSPHORUS: CPT

## 2019-07-21 PROCEDURE — 83735 ASSAY OF MAGNESIUM: CPT

## 2019-07-21 PROCEDURE — 94640 AIRWAY INHALATION TREATMENT: CPT

## 2019-07-21 PROCEDURE — 82803 BLOOD GASES ANY COMBINATION: CPT

## 2019-07-21 PROCEDURE — 96376 TX/PRO/DX INJ SAME DRUG ADON: CPT

## 2019-07-21 PROCEDURE — 25000003 PHARM REV CODE 250: Performed by: NURSE PRACTITIONER

## 2019-07-21 PROCEDURE — 27000221 HC OXYGEN, UP TO 24 HOURS

## 2019-07-21 PROCEDURE — 25000003 PHARM REV CODE 250: Performed by: HOSPITALIST

## 2019-07-21 PROCEDURE — 82550 ASSAY OF CK (CPK): CPT

## 2019-07-21 PROCEDURE — 96375 TX/PRO/DX INJ NEW DRUG ADDON: CPT

## 2019-07-21 RX ORDER — CHLORDIAZEPOXIDE HYDROCHLORIDE 10 MG/1
10 CAPSULE, GELATIN COATED ORAL 2 TIMES DAILY
Status: DISCONTINUED | OUTPATIENT
Start: 2019-07-21 | End: 2019-07-23 | Stop reason: HOSPADM

## 2019-07-21 RX ORDER — ARFORMOTEROL TARTRATE 15 UG/2ML
15 SOLUTION RESPIRATORY (INHALATION) EVERY 12 HOURS
Status: DISCONTINUED | OUTPATIENT
Start: 2019-07-21 | End: 2019-07-23 | Stop reason: HOSPADM

## 2019-07-21 RX ORDER — ARFORMOTEROL TARTRATE 15 UG/2ML
15 SOLUTION RESPIRATORY (INHALATION) EVERY 12 HOURS
Status: DISCONTINUED | OUTPATIENT
Start: 2019-07-21 | End: 2019-07-21

## 2019-07-21 RX ORDER — SODIUM CHLORIDE 9 MG/ML
INJECTION, SOLUTION INTRAVENOUS CONTINUOUS
Status: DISCONTINUED | OUTPATIENT
Start: 2019-07-21 | End: 2019-07-23 | Stop reason: HOSPADM

## 2019-07-21 RX ORDER — BUDESONIDE 0.5 MG/2ML
0.5 INHALANT ORAL EVERY 12 HOURS
Status: DISCONTINUED | OUTPATIENT
Start: 2019-07-21 | End: 2019-07-21

## 2019-07-21 RX ORDER — BUDESONIDE 0.5 MG/2ML
0.5 INHALANT ORAL EVERY 12 HOURS
Status: DISCONTINUED | OUTPATIENT
Start: 2019-07-21 | End: 2019-07-23 | Stop reason: HOSPADM

## 2019-07-21 RX ADMIN — SODIUM CHLORIDE: 0.9 INJECTION, SOLUTION INTRAVENOUS at 03:07

## 2019-07-21 RX ADMIN — METHYLPREDNISOLONE SODIUM SUCCINATE 40 MG: 40 INJECTION, POWDER, FOR SOLUTION INTRAMUSCULAR; INTRAVENOUS at 05:07

## 2019-07-21 RX ADMIN — HEPARIN SODIUM 5000 UNITS: 5000 INJECTION, SOLUTION INTRAVENOUS; SUBCUTANEOUS at 01:07

## 2019-07-21 RX ADMIN — FOLIC ACID: 5 INJECTION, SOLUTION INTRAMUSCULAR; INTRAVENOUS; SUBCUTANEOUS at 08:07

## 2019-07-21 RX ADMIN — IPRATROPIUM BROMIDE AND ALBUTEROL SULFATE 3 ML: .5; 3 SOLUTION RESPIRATORY (INHALATION) at 07:07

## 2019-07-21 RX ADMIN — LORAZEPAM 1 MG: 2 INJECTION INTRAMUSCULAR; INTRAVENOUS at 04:07

## 2019-07-21 RX ADMIN — CHLORDIAZEPOXIDE HYDROCHLORIDE 10 MG: 10 CAPSULE ORAL at 09:07

## 2019-07-21 RX ADMIN — BUPROPION HYDROCHLORIDE 300 MG: 150 TABLET, EXTENDED RELEASE ORAL at 01:07

## 2019-07-21 RX ADMIN — TAMSULOSIN HYDROCHLORIDE 0.4 MG: 0.4 CAPSULE ORAL at 01:07

## 2019-07-21 RX ADMIN — ARFORMOTEROL TARTRATE 15 MCG: 15 SOLUTION RESPIRATORY (INHALATION) at 07:07

## 2019-07-21 RX ADMIN — METHYLPREDNISOLONE SODIUM SUCCINATE 40 MG: 40 INJECTION, POWDER, FOR SOLUTION INTRAMUSCULAR; INTRAVENOUS at 11:07

## 2019-07-21 RX ADMIN — CHLORDIAZEPOXIDE HYDROCHLORIDE 10 MG: 10 CAPSULE ORAL at 01:07

## 2019-07-21 RX ADMIN — IPRATROPIUM BROMIDE AND ALBUTEROL SULFATE 3 ML: .5; 3 SOLUTION RESPIRATORY (INHALATION) at 12:07

## 2019-07-21 RX ADMIN — IPRATROPIUM BROMIDE AND ALBUTEROL SULFATE 3 ML: .5; 3 SOLUTION RESPIRATORY (INHALATION) at 11:07

## 2019-07-21 RX ADMIN — ATORVASTATIN CALCIUM 10 MG: 10 TABLET, FILM COATED ORAL at 01:07

## 2019-07-21 RX ADMIN — IPRATROPIUM BROMIDE AND ALBUTEROL SULFATE 3 ML: .5; 3 SOLUTION RESPIRATORY (INHALATION) at 03:07

## 2019-07-21 RX ADMIN — AMLODIPINE BESYLATE 2.5 MG: 2.5 TABLET ORAL at 01:07

## 2019-07-21 RX ADMIN — LORAZEPAM 1 MG: 2 INJECTION INTRAMUSCULAR; INTRAVENOUS at 06:07

## 2019-07-21 RX ADMIN — BUDESONIDE 0.5 MG: 0.5 SUSPENSION RESPIRATORY (INHALATION) at 07:07

## 2019-07-21 RX ADMIN — HEPARIN SODIUM 5000 UNITS: 5000 INJECTION, SOLUTION INTRAVENOUS; SUBCUTANEOUS at 09:07

## 2019-07-21 RX ADMIN — HEPARIN SODIUM 5000 UNITS: 5000 INJECTION, SOLUTION INTRAVENOUS; SUBCUTANEOUS at 05:07

## 2019-07-21 RX ADMIN — ASPIRIN 81 MG: 81 TABLET, COATED ORAL at 01:07

## 2019-07-21 NOTE — PLAN OF CARE
Problem: Adult Inpatient Plan of Care  Goal: Plan of Care Review  Outcome: Ongoing (interventions implemented as appropriate)  Patient remains free of falls and safety precautions maintained. Afebrile. No complaints of pain. Patient oriented to self. Improving behavior with niece at bedside. NSR. Patient continues to have rapid breathing using accessory muscles. 3L NC. Will continue to monitor.12 hour chart check.

## 2019-07-21 NOTE — ASSESSMENT & PLAN NOTE
Current smoker  duonebs prn sob/wheezing    7/21  Pt having continued increase wheezing, current smoker   Continue Duo nebs and IV steriods   Start LABA and ICS  Monitor

## 2019-07-21 NOTE — PROGRESS NOTES
Patient responds to touch and voice at this time. Unable to hold a conversation or follow commands due to ativan this am. Will hold morning medications until patient more alert.

## 2019-07-21 NOTE — PLAN OF CARE
Problem: Adult Inpatient Plan of Care  Goal: Plan of Care Review  Outcome: Ongoing (interventions implemented as appropriate)  Remains free from injury. States relief of pain with available prn meds. Bed alarm in use for fall prevention. Turning q2hr for pressure ulcer prevention. Vital signs stable. Chart reviewed. Will continue to monitor.

## 2019-07-21 NOTE — SUBJECTIVE & OBJECTIVE
Interval History: Patient seen and examined, was initially difficult to arouse this after receiving Ativan. O 2 sats and ABG stable. He later wore up and became somewhat combative.    Review of Systems   Constitutional: Negative for chills, diaphoresis, fatigue and fever.   HENT: Negative for congestion, ear discharge, rhinorrhea, sinus pressure, sore throat and trouble swallowing.    Eyes: Negative for discharge and visual disturbance.   Respiratory: Positive for shortness of breath and wheezing. Negative for apnea, cough, choking, chest tightness and stridor.    Cardiovascular: Negative for chest pain, palpitations and leg swelling.   Gastrointestinal: Negative for abdominal distention, abdominal pain, diarrhea, nausea and vomiting.   Endocrine: Negative for cold intolerance and heat intolerance.   Genitourinary: Negative for dysuria, frequency and hematuria.   Musculoskeletal: Negative for arthralgias, back pain, myalgias and neck pain.   Skin: Negative for pallor and rash.   Neurological: Negative for dizziness, seizures, syncope, weakness and headaches.   Psychiatric/Behavioral: Positive for agitation. Negative for confusion and sleep disturbance.     Objective:     Vital Signs (Most Recent):  Temp: 96.1 °F (35.6 °C) (07/21/19 1140)  Pulse: 84 (07/21/19 1322)  Resp: (!) 22 (07/21/19 1140)  BP: 124/76 (07/21/19 1140)  SpO2: 96 % (07/21/19 1140) Vital Signs (24h Range):  Temp:  [96.1 °F (35.6 °C)-98.2 °F (36.8 °C)] 96.1 °F (35.6 °C)  Pulse:  [] 84  Resp:  [16-28] 22  SpO2:  [95 %-98 %] 96 %  BP: (119-150)/(59-76) 124/76     Weight: 83.9 kg (185 lb)  Body mass index is 31.76 kg/m².    Intake/Output Summary (Last 24 hours) at 7/21/2019 1507  Last data filed at 7/21/2019 1200  Gross per 24 hour   Intake 240 ml   Output --   Net 240 ml      Physical Exam   Constitutional: No distress.   HENT:   Mouth/Throat: No oropharyngeal exudate.   Cardiovascular: Exam reveals no gallop and no friction rub.   No murmur  heard.  Pulmonary/Chest: No stridor. No respiratory distress. He has no wheezes. He has no rales. He exhibits no tenderness.   Abdominal: He exhibits no distension and no mass. There is no tenderness. There is no rebound and no guarding. No hernia.   Large abdomen      Musculoskeletal: He exhibits no edema or deformity.   Skin: Skin is warm. Capillary refill takes less than 2 seconds. He is not diaphoretic.   Psychiatric: His mood appears anxious. He is agitated.   Nursing note and vitals reviewed.      Significant Labs:   CBC:   Recent Labs   Lab 07/20/19  0013 07/20/19  0549 07/21/19  0841   WBC 26.17* 18.26* 16.78*   HGB 13.2* 11.5* 12.0*   HCT 39.9* 34.8* 36.8*    245 221     CMP:   Recent Labs   Lab 07/20/19  0013 07/20/19  0549 07/21/19  0841    134* 133*  133*   K 4.5 4.9 3.9  3.9   CL 98 102 101  101   CO2 20* 19* 20*  20*   * 136* 246*  246*   BUN 29* 28* 22  22   CREATININE 2.2* 1.6* 1.0  1.0   CALCIUM 10.2 8.3* 8.8  8.8   PROT 7.7  --  6.7   ALBUMIN 4.1 3.2* 3.1*  3.1*   BILITOT 0.6  --  0.5   ALKPHOS 133  --  98   AST 40  --  50*   ALT 31  --  29   ANIONGAP 18* 13 12  12   EGFRNONAA 27* 39* >60  >60       Significant Imaging:     Imaging Results          CT Chest Without Contrast (Final result)  Result time 07/20/19 09:02:18    Final result by Marco Paula MD (07/20/19 09:02:18)                 Impression:      No acute cardiopulmonary process.      Electronically signed by: Marco Paula  Date:    07/20/2019  Time:    09:02             Narrative:    EXAMINATION:  CT CHEST WITHOUT CONTRAST    CLINICAL HISTORY:  Sepsis;    TECHNIQUE:  Low dose axial images, sagittal and coronal reformations were obtained from the thoracic inlet to the lung bases. Contrast was not administered.  All CT scans at this location are performed using dose modulation techniques as appropriate to a performed exam including the following: Automated exposure control; adjustment of the mA and/or kV   according to patient size.    COMPARISON:  07/20/2019    FINDINGS:  Base of Neck: No significant abnormality.    Thoracic soft tissues: Normal.    Aorta: Left-sided aortic arch.  No aneurysm and no significant atherosclerosis    Heart: Normal size. No effusion.    Pulmonary vasculature: Pulmonary arteries distribute normally.  There are four pulmonary veins.    Amita/Mediastinum: No pathologic rudy enlargement.    Airways: Patent.    Lungs/Pleura: Clear lungs. No pleural effusion or thickening.  Prominent left pericardial fat pad accounts for chest radiograph appearance.    Esophagus: Normal.    Upper Abdomen: No abnormality of the partially imaged upper abdomen.  Cholecystectomy clips.    Bones: No acute fracture. No suspicious lytic or sclerotic lesions.                               X-Ray Humerus 2 View Bilateral (Final result)  Result time 07/20/19 08:26:04    Final result by Marco Paula MD (07/20/19 08:26:04)                 Impression:      As above.      Electronically signed by: Marco Paula  Date:    07/20/2019  Time:    08:26             Narrative:    EXAMINATION:  XR HUMERUS 2 VIEW BILATERAL    CLINICAL HISTORY:  bilateral elbow pain;    TECHNIQUE:  Bilateral humeral radiographs    COMPARISON:  None    FINDINGS:  Questionable linear lucency of the distal left humerus near the ulnohumeral joint raising question of fracture, only appreciated on the lateral view.  Please correlate with point tenderness.  Recommend dedicated left elbow radiographs.    Right humerus grossly unremarkable.                               X-Ray Hips Bilateral 2 View Incl AP Pelvis (Final result)  Result time 07/20/19 08:23:01    Final result by Marco Paula MD (07/20/19 08:23:01)                 Impression:      No acute abnormality.      Electronically signed by: Marco Paula  Date:    07/20/2019  Time:    08:23             Narrative:    EXAMINATION:  XR HIPS BILATERAL 2 VIEW INCL AP PELVIS    CLINICAL HISTORY:  Pain in right  hip    TECHNIQUE:  AP view of the pelvis and frogleg lateral views of both hips were performed.    COMPARISON:  None.    FINDINGS:  No evidence of fracture or dislocation.  Mild hip joint space narrowing.  SI joints unremarkable.  Degenerative changes lumbar spine.  Moderate volume stool overlying the rectum.                               X-Ray Knee 1 or 2 View Bilateral (Final result)  Result time 07/20/19 08:15:18    Final result by Marco Paula MD (07/20/19 08:15:18)                 Impression:      No acute abnormality identified.      Electronically signed by: Marco Paula  Date:    07/20/2019  Time:    08:15             Narrative:    EXAMINATION:  XR KNEE 1 OR 2 VIEW BILATERAL    CLINICAL HISTORY:  Abrasion, right knee, initial encounter    TECHNIQUE:  AP, lateral, and Merchant views of the bilateral knees were performed.    COMPARISON:  None    FINDINGS:  No evidence of fracture or dislocation.  No significant suprapatellar knee effusion.    Soft tissues unremarkable.  No foreign body.    Right knee, moderate medial compartment narrowing with trace calcification of the medial and lateral menisci/cartilage.  Tricompartmental osteophytes.    Left knee, severe bone on bone medial compartment narrowing with articular surface sclerosis and mild varus angulation at the knee joint.  Tricompartmental osteophytes.                               X-Ray Chest AP Portable (Final result)  Result time 07/20/19 08:13:26    Final result by Marco Paula MD (07/20/19 08:13:26)                 Impression:      As above.      Electronically signed by: Marco Paula  Date:    07/20/2019  Time:    08:13             Narrative:    EXAMINATION:  XR CHEST AP PORTABLE    CLINICAL HISTORY:  wheezing;.    TECHNIQUE:  Single frontal portable view of the chest was performed.    COMPARISON:  None    FINDINGS:  Support devices: None    Suggestion of moderate volume left pleural effusion.  Left heart border is obscured.  Airspace disease not  excluded left perihilar region left lung base.  Tiny right pleural effusion.    Bones are intact.                               X-Ray Forearm Bilateral (Final result)  Result time 07/20/19 08:12:08    Final result by Marco Paula MD (07/20/19 08:12:08)                 Impression:      No acute fracture.      Electronically signed by: Marco Paula  Date:    07/20/2019  Time:    08:12             Narrative:    EXAMINATION:  XR FOREARM BILATERAL    CLINICAL HISTORY:  bilateral elbow pain;    TECHNIQUE:  Radiographs of the left and right elbows    COMPARISON:  None    FINDINGS:  Mild degenerative change bilateral radiohumeral joints.  At the left elbow, chronic appearing posttraumatic change along the proximal medial aspect of the ulna.  No acute fracture.  No appreciable elbow joint effusion.                               CT Head Without Contrast (Final result)  Result time 07/20/19 08:05:41    Final result by Marco Paula MD (07/20/19 08:05:41)                 Impression:      No acute abnormality.    All CT scans at this facility use dose modulation, iterative reconstruction, and/or weight based dosing when appropriate to reduce radiation dose to as low as reasonably achievable.      Electronically signed by: Marco Paula  Date:    07/20/2019  Time:    08:05             Narrative:    EXAMINATION:  CT HEAD WITHOUT CONTRAST    CLINICAL HISTORY:  Syncope/fainting; Unspecified fall, initial encounter    TECHNIQUE:  Low dose axial CT images obtained throughout the head without intravenous contrast. Sagittal and coronal reconstructions were performed.    COMPARISON:  04/16/2017    FINDINGS:  Intracranial compartment:    Ventricles and sulci are normal in size for age without evidence of hydrocephalus. No extra-axial blood or fluid collections.    Sulcal widening and enlargement of ventricles suggesting age-related white matter volume loss. Bilateral mild periventricular white matter hypoattenuation as can be seen with  chronic microangiopathic small-vessel disease. No parenchymal mass, hemorrhage, edema or major vascular distribution infarct.    Skull/extracranial contents (limited evaluation): No fracture. Mastoid air cells and paranasal sinuses are essentially clear.

## 2019-07-21 NOTE — PROGRESS NOTES
Notified BROWN Valle via secure message about concern for patient declining. Patient becoming combative and unable to follows commands or re-direct. Sitter has been ordered to sit at bedside. Will continue to monitor.

## 2019-07-21 NOTE — NURSING
Pt admits to drinking daily bourbon. Pt violent and swinging at staff. Unable to obtain blood samples at this time. Dr Hillman notified. No new orders.

## 2019-07-21 NOTE — PT/OT/SLP PROGRESS
Physical Therapy      Patient Name:  Troy Chau   MRN:  [id number]    Attempted to see patient at 0809.  Patient sleeping and unable to be aroused with verbal and tactile stimulation.  PT spoke with nurse Leah.  She stated that patient had been combative earlier this morning and was given Ativan.  Attempted to see patient again at 1111; however, he was still sleeping.  Will follow up at next visit.    Janet Chakraborty, PT, DPT

## 2019-07-21 NOTE — PROGRESS NOTES
ABG attempted x2 per leandro ,crt and molina,rrt. With each attempt pt moves and unable to hold arms still. Rossi,NP notified, will attempt abg at a later time.

## 2019-07-21 NOTE — PROGRESS NOTES
Notified MD and NP of patient respiratory status and unable to administer medications. BROWN Valle at bedside to assess.

## 2019-07-21 NOTE — ASSESSMENT & PLAN NOTE
- S/p mechanical fall  - CPK 1000, Cr 2.2, baseline Cr 0.8  - Continue IVF hydration  - replete electrolytes PRN  - monitor I/O  - daily renal panel and cpk    7/22  CK now trending downward  Creatinine 1.0 today   Renal function at baseline   Will continue gentle hydration   Monitor

## 2019-07-21 NOTE — PROGRESS NOTES
[facility] Hospital Medicine  Progress Note    Patient Name: Troy Chau  MRN: [id number]  Patient Class: OP- Observation   Admission Date: 7/19/2019  Length of Stay: 0 days  Attending Physician: Mirlande Kenyon MD  Primary Care Provider: Tanvir Petersen MD        Subjective:     Principal Problem:Acute renal failure due to traumatic rhabdomyolysis      HPI:  83M h/o HTN, HLD, COPD, and BPH presents s/p mechanical fall.  Incurred multiple abrasions involving both arms, both knees, hip and back, as well as head trauma.  Denies LOC, dizziness, seizure-like activity, muscle weakness, numbness, HA, SOB, CP, ab pain, constipation/diarrhea, recent illness.  In ER, vitals stable.  Imaging of head, both arms, both knees were negative for acute fx or process.  CXR show increase in left lower lobe opacity.   Wbc 26, BUN 29, Cr 2.2, CPK 1000.   ER physician (Justina) gave patient 30ml/kg fluid bolus and zosyn IV bolus with concerned for sepsis.  Because CXR findings more consistent with pleural effusion than pnuemonia, CT chest requested by Hospital Medicine to not only locate a source of infection but also evaluate for pulmonary hemorrhage, hemothorax  s/p fall and trauma.  CT chest statrad reading was negative for any acute findings suggesting hemothorax, pulmonary hemorrhage, significant pleural effusion or pneumonia.   Patient placed in observation for ADRIAN due to traumatic rhabdomyolysis s/p mechanical fall.     Overview/Hospital Course:  On 7/19 patient was placed in OBS secondary to ADRIAN associated with traumatic rhabdomyolysis after falling at home.  Cr elevated to 2.2, baseline is around 0.8.  Also noted to have an elevated WBC count of 18k which was thought to be r/t stress reaction as CT chest and UA were both negative for acute pulmonary process.  Patient also afebrile.  Patient also noted to have a mildly elevated troponin of 0.54.  12 lead EKG showed NSR with no significant change.  Patient  was given empiric ABX in the ER and started on IVFs.  PT/OT consulted given recent fall and recommended SNF placement.  CM consulted to assist with DC planning.    As of 7/21, patient seen and examined this AM, he was initially difficult to arouse this after receiving Ativan. O 2 sats and ABG stable. He later wore up and became somewhat combative, however patient now stable with family member at bedside. ADRIAN has resolved with IVF. Wheezing improved after adding LABA and ICS, continue current management.       Interval History: Patient seen and examined, was initially difficult to arouse this after receiving Ativan. O 2 sats and ABG stable. He later wore up and became somewhat combative.    Review of Systems   Constitutional: Negative for chills, diaphoresis, fatigue and fever.   HENT: Negative for congestion, ear discharge, rhinorrhea, sinus pressure, sore throat and trouble swallowing.    Eyes: Negative for discharge and visual disturbance.   Respiratory: Positive for shortness of breath and wheezing. Negative for apnea, cough, choking, chest tightness and stridor.    Cardiovascular: Negative for chest pain, palpitations and leg swelling.   Gastrointestinal: Negative for abdominal distention, abdominal pain, diarrhea, nausea and vomiting.   Endocrine: Negative for cold intolerance and heat intolerance.   Genitourinary: Negative for dysuria, frequency and hematuria.   Musculoskeletal: Negative for arthralgias, back pain, myalgias and neck pain.   Skin: Negative for pallor and rash.   Neurological: Negative for dizziness, seizures, syncope, weakness and headaches.   Psychiatric/Behavioral: Positive for agitation. Negative for confusion and sleep disturbance.     Objective:     Vital Signs (Most Recent):  Temp: 96.1 °F (35.6 °C) (07/21/19 1140)  Pulse: 84 (07/21/19 1322)  Resp: (!) 22 (07/21/19 1140)  BP: 124/76 (07/21/19 1140)  SpO2: 96 % (07/21/19 1140) Vital Signs (24h Range):  Temp:  [96.1 °F (35.6 °C)-98.2 °F  (36.8 °C)] 96.1 °F (35.6 °C)  Pulse:  [] 84  Resp:  [16-28] 22  SpO2:  [95 %-98 %] 96 %  BP: (119-150)/(59-76) 124/76     Weight: 83.9 kg (185 lb)  Body mass index is 31.76 kg/m².    Intake/Output Summary (Last 24 hours) at 7/21/2019 1507  Last data filed at 7/21/2019 1200  Gross per 24 hour   Intake 240 ml   Output --   Net 240 ml      Physical Exam   Constitutional: No distress.   HENT:   Mouth/Throat: No oropharyngeal exudate.   Cardiovascular: Exam reveals no gallop and no friction rub.   No murmur heard.  Pulmonary/Chest: No stridor. No respiratory distress. He has no wheezes. He has no rales. He exhibits no tenderness.   Abdominal: He exhibits no distension and no mass. There is no tenderness. There is no rebound and no guarding. No hernia.   Large abdomen      Musculoskeletal: He exhibits no edema or deformity.   Skin: Skin is warm. Capillary refill takes less than 2 seconds. He is not diaphoretic.   Psychiatric: His mood appears anxious. He is agitated.   Nursing note and vitals reviewed.      Significant Labs:   CBC:   Recent Labs   Lab 07/20/19  0013 07/20/19  0549 07/21/19  0841   WBC 26.17* 18.26* 16.78*   HGB 13.2* 11.5* 12.0*   HCT 39.9* 34.8* 36.8*    245 221     CMP:   Recent Labs   Lab 07/20/19  0013 07/20/19  0549 07/21/19  0841    134* 133*  133*   K 4.5 4.9 3.9  3.9   CL 98 102 101  101   CO2 20* 19* 20*  20*   * 136* 246*  246*   BUN 29* 28* 22  22   CREATININE 2.2* 1.6* 1.0  1.0   CALCIUM 10.2 8.3* 8.8  8.8   PROT 7.7  --  6.7   ALBUMIN 4.1 3.2* 3.1*  3.1*   BILITOT 0.6  --  0.5   ALKPHOS 133  --  98   AST 40  --  50*   ALT 31  --  29   ANIONGAP 18* 13 12  12   EGFRNONAA 27* 39* >60  >60       Significant Imaging:     Imaging Results          CT Chest Without Contrast (Final result)  Result time 07/20/19 09:02:18    Final result by Marco Paula MD (07/20/19 09:02:18)                 Impression:      No acute cardiopulmonary process.      Electronically  signed by: Marco Paula  Date:    07/20/2019  Time:    09:02             Narrative:    EXAMINATION:  CT CHEST WITHOUT CONTRAST    CLINICAL HISTORY:  Sepsis;    TECHNIQUE:  Low dose axial images, sagittal and coronal reformations were obtained from the thoracic inlet to the lung bases. Contrast was not administered.  All CT scans at this location are performed using dose modulation techniques as appropriate to a performed exam including the following: Automated exposure control; adjustment of the mA and/or kV  according to patient size.    COMPARISON:  07/20/2019    FINDINGS:  Base of Neck: No significant abnormality.    Thoracic soft tissues: Normal.    Aorta: Left-sided aortic arch.  No aneurysm and no significant atherosclerosis    Heart: Normal size. No effusion.    Pulmonary vasculature: Pulmonary arteries distribute normally.  There are four pulmonary veins.    Amita/Mediastinum: No pathologic rudy enlargement.    Airways: Patent.    Lungs/Pleura: Clear lungs. No pleural effusion or thickening.  Prominent left pericardial fat pad accounts for chest radiograph appearance.    Esophagus: Normal.    Upper Abdomen: No abnormality of the partially imaged upper abdomen.  Cholecystectomy clips.    Bones: No acute fracture. No suspicious lytic or sclerotic lesions.                               X-Ray Humerus 2 View Bilateral (Final result)  Result time 07/20/19 08:26:04    Final result by Marco Paula MD (07/20/19 08:26:04)                 Impression:      As above.      Electronically signed by: Marco Paula  Date:    07/20/2019  Time:    08:26             Narrative:    EXAMINATION:  XR HUMERUS 2 VIEW BILATERAL    CLINICAL HISTORY:  bilateral elbow pain;    TECHNIQUE:  Bilateral humeral radiographs    COMPARISON:  None    FINDINGS:  Questionable linear lucency of the distal left humerus near the ulnohumeral joint raising question of fracture, only appreciated on the lateral view.  Please correlate with point  tenderness.  Recommend dedicated left elbow radiographs.    Right humerus grossly unremarkable.                               X-Ray Hips Bilateral 2 View Incl AP Pelvis (Final result)  Result time 07/20/19 08:23:01    Final result by Marco Paula MD (07/20/19 08:23:01)                 Impression:      No acute abnormality.      Electronically signed by: Marco Paula  Date:    07/20/2019  Time:    08:23             Narrative:    EXAMINATION:  XR HIPS BILATERAL 2 VIEW INCL AP PELVIS    CLINICAL HISTORY:  Pain in right hip    TECHNIQUE:  AP view of the pelvis and frogleg lateral views of both hips were performed.    COMPARISON:  None.    FINDINGS:  No evidence of fracture or dislocation.  Mild hip joint space narrowing.  SI joints unremarkable.  Degenerative changes lumbar spine.  Moderate volume stool overlying the rectum.                               X-Ray Knee 1 or 2 View Bilateral (Final result)  Result time 07/20/19 08:15:18    Final result by Marco Paula MD (07/20/19 08:15:18)                 Impression:      No acute abnormality identified.      Electronically signed by: Marco Paula  Date:    07/20/2019  Time:    08:15             Narrative:    EXAMINATION:  XR KNEE 1 OR 2 VIEW BILATERAL    CLINICAL HISTORY:  Abrasion, right knee, initial encounter    TECHNIQUE:  AP, lateral, and Merchant views of the bilateral knees were performed.    COMPARISON:  None    FINDINGS:  No evidence of fracture or dislocation.  No significant suprapatellar knee effusion.    Soft tissues unremarkable.  No foreign body.    Right knee, moderate medial compartment narrowing with trace calcification of the medial and lateral menisci/cartilage.  Tricompartmental osteophytes.    Left knee, severe bone on bone medial compartment narrowing with articular surface sclerosis and mild varus angulation at the knee joint.  Tricompartmental osteophytes.                               X-Ray Chest AP Portable (Final result)  Result time  07/20/19 08:13:26    Final result by Marco Paula MD (07/20/19 08:13:26)                 Impression:      As above.      Electronically signed by: Marco Paula  Date:    07/20/2019  Time:    08:13             Narrative:    EXAMINATION:  XR CHEST AP PORTABLE    CLINICAL HISTORY:  wheezing;.    TECHNIQUE:  Single frontal portable view of the chest was performed.    COMPARISON:  None    FINDINGS:  Support devices: None    Suggestion of moderate volume left pleural effusion.  Left heart border is obscured.  Airspace disease not excluded left perihilar region left lung base.  Tiny right pleural effusion.    Bones are intact.                               X-Ray Forearm Bilateral (Final result)  Result time 07/20/19 08:12:08    Final result by Marco Paula MD (07/20/19 08:12:08)                 Impression:      No acute fracture.      Electronically signed by: Marco Paula  Date:    07/20/2019  Time:    08:12             Narrative:    EXAMINATION:  XR FOREARM BILATERAL    CLINICAL HISTORY:  bilateral elbow pain;    TECHNIQUE:  Radiographs of the left and right elbows    COMPARISON:  None    FINDINGS:  Mild degenerative change bilateral radiohumeral joints.  At the left elbow, chronic appearing posttraumatic change along the proximal medial aspect of the ulna.  No acute fracture.  No appreciable elbow joint effusion.                               CT Head Without Contrast (Final result)  Result time 07/20/19 08:05:41    Final result by Marco Paula MD (07/20/19 08:05:41)                 Impression:      No acute abnormality.    All CT scans at this facility use dose modulation, iterative reconstruction, and/or weight based dosing when appropriate to reduce radiation dose to as low as reasonably achievable.      Electronically signed by: Marco Paula  Date:    07/20/2019  Time:    08:05             Narrative:    EXAMINATION:  CT HEAD WITHOUT CONTRAST    CLINICAL HISTORY:  Syncope/fainting; Unspecified fall, initial  encounter    TECHNIQUE:  Low dose axial CT images obtained throughout the head without intravenous contrast. Sagittal and coronal reconstructions were performed.    COMPARISON:  04/16/2017    FINDINGS:  Intracranial compartment:    Ventricles and sulci are normal in size for age without evidence of hydrocephalus. No extra-axial blood or fluid collections.    Sulcal widening and enlargement of ventricles suggesting age-related white matter volume loss. Bilateral mild periventricular white matter hypoattenuation as can be seen with chronic microangiopathic small-vessel disease. No parenchymal mass, hemorrhage, edema or major vascular distribution infarct.    Skull/extracranial contents (limited evaluation): No fracture. Mastoid air cells and paranasal sinuses are essentially clear.                                Assessment/Plan:      * Acute renal failure due to traumatic rhabdomyolysis  - S/p mechanical fall  - CPK 1000, Cr 2.2, baseline Cr 0.8  - Continue IVF hydration  - replete electrolytes PRN  - monitor I/O  - daily renal panel and cpk    7/22  CK now trending downward  Creatinine 1.0 today   Renal function at baseline   Will continue gentle hydration   Monitor       COPD (chronic obstructive pulmonary disease)  Current smoker  duonebs prn sob/wheezing    7/21  Pt having continued increase wheezing, current smoker   Continue Duo nebs and IV steriods   Start LABA and ICS  Monitor       Elevated WBC count  - due to stress state s/p fall      Elevated troponin  - due to rhabdomyolysis  - no chest pain, sob, or concerns for ACS       BPH (benign prostatic hyperplasia)  - continue tamsulosin      Essential hypertension  - continue amlodipine 2.5mg daily  - hold lasix due to ADRIAN      Fall at home  - Imaging negative for fractures or acute process  - fall risk        VTE Risk Mitigation (From admission, onward)        Ordered     heparin (porcine) injection 5,000 Units  Every 8 hours      07/20/19 0449     [facility]  hose  Until discontinued      07/20/19 0449     Place sequential compression device  Until discontinued      07/20/19 0449     IP VTE HIGH RISK PATIENT  Once      07/20/19 0449                Malick Adkins NP  Department of Hospital Medicine   [facility]

## 2019-07-22 PROBLEM — T07.XXXA ABRASIONS OF MULTIPLE SITES: Status: ACTIVE | Noted: 2019-07-22

## 2019-07-22 LAB
ALBUMIN SERPL BCP-MCNC: 2.8 G/DL (ref 3.5–5.2)
ANION GAP SERPL CALC-SCNC: 9 MMOL/L (ref 8–16)
ANION GAP SERPL CALC-SCNC: 9 MMOL/L (ref 8–16)
BASOPHILS # BLD AUTO: 0 K/UL (ref 0–0.2)
BASOPHILS # BLD AUTO: 0 K/UL (ref 0–0.2)
BASOPHILS NFR BLD: 0 % (ref 0–1.9)
BASOPHILS NFR BLD: 0 % (ref 0–1.9)
BUN SERPL-MCNC: 25 MG/DL (ref 8–23)
BUN SERPL-MCNC: 25 MG/DL (ref 8–23)
CALCIUM SERPL-MCNC: 8.4 MG/DL (ref 8.7–10.5)
CALCIUM SERPL-MCNC: 8.4 MG/DL (ref 8.7–10.5)
CHLORIDE SERPL-SCNC: 101 MMOL/L (ref 95–110)
CHLORIDE SERPL-SCNC: 101 MMOL/L (ref 95–110)
CK SERPL-CCNC: 1492 U/L (ref 20–200)
CK SERPL-CCNC: 1492 U/L (ref 20–200)
CO2 SERPL-SCNC: 23 MMOL/L (ref 23–29)
CO2 SERPL-SCNC: 23 MMOL/L (ref 23–29)
CREAT SERPL-MCNC: 0.9 MG/DL (ref 0.5–1.4)
CREAT SERPL-MCNC: 0.9 MG/DL (ref 0.5–1.4)
DIFFERENTIAL METHOD: ABNORMAL
DIFFERENTIAL METHOD: ABNORMAL
EOSINOPHIL # BLD AUTO: 0 K/UL (ref 0–0.5)
EOSINOPHIL # BLD AUTO: 0 K/UL (ref 0–0.5)
EOSINOPHIL NFR BLD: 0 % (ref 0–8)
EOSINOPHIL NFR BLD: 0 % (ref 0–8)
ERYTHROCYTE [DISTWIDTH] IN BLOOD BY AUTOMATED COUNT: 15.2 % (ref 11.5–14.5)
ERYTHROCYTE [DISTWIDTH] IN BLOOD BY AUTOMATED COUNT: 15.2 % (ref 11.5–14.5)
EST. GFR  (AFRICAN AMERICAN): >60 ML/MIN/1.73 M^2
EST. GFR  (AFRICAN AMERICAN): >60 ML/MIN/1.73 M^2
EST. GFR  (NON AFRICAN AMERICAN): >60 ML/MIN/1.73 M^2
EST. GFR  (NON AFRICAN AMERICAN): >60 ML/MIN/1.73 M^2
GLUCOSE SERPL-MCNC: 205 MG/DL (ref 70–110)
GLUCOSE SERPL-MCNC: 205 MG/DL (ref 70–110)
HCT VFR BLD AUTO: 31.9 % (ref 40–54)
HCT VFR BLD AUTO: 31.9 % (ref 40–54)
HGB BLD-MCNC: 10.2 G/DL (ref 14–18)
HGB BLD-MCNC: 10.2 G/DL (ref 14–18)
LYMPHOCYTES # BLD AUTO: 0.8 K/UL (ref 1–4.8)
LYMPHOCYTES # BLD AUTO: 0.8 K/UL (ref 1–4.8)
LYMPHOCYTES NFR BLD: 4.1 % (ref 18–48)
LYMPHOCYTES NFR BLD: 4.1 % (ref 18–48)
MAGNESIUM SERPL-MCNC: 2.1 MG/DL (ref 1.6–2.6)
MCH RBC QN AUTO: 27.8 PG (ref 27–31)
MCH RBC QN AUTO: 27.8 PG (ref 27–31)
MCHC RBC AUTO-ENTMCNC: 32 G/DL (ref 32–36)
MCHC RBC AUTO-ENTMCNC: 32 G/DL (ref 32–36)
MCV RBC AUTO: 87 FL (ref 82–98)
MCV RBC AUTO: 87 FL (ref 82–98)
MONOCYTES # BLD AUTO: 0.7 K/UL (ref 0.3–1)
MONOCYTES # BLD AUTO: 0.7 K/UL (ref 0.3–1)
MONOCYTES NFR BLD: 3.6 % (ref 4–15)
MONOCYTES NFR BLD: 3.6 % (ref 4–15)
NEUTROPHILS # BLD AUTO: 17.7 K/UL (ref 1.8–7.7)
NEUTROPHILS # BLD AUTO: 17.7 K/UL (ref 1.8–7.7)
NEUTROPHILS NFR BLD: 92.9 % (ref 38–73)
NEUTROPHILS NFR BLD: 92.9 % (ref 38–73)
OVALOCYTES BLD QL SMEAR: ABNORMAL
OVALOCYTES BLD QL SMEAR: ABNORMAL
PHOSPHATE SERPL-MCNC: 2.8 MG/DL (ref 2.7–4.5)
PHOSPHATE SERPL-MCNC: 2.8 MG/DL (ref 2.7–4.5)
PLATELET # BLD AUTO: 227 K/UL (ref 150–350)
PLATELET # BLD AUTO: 227 K/UL (ref 150–350)
PMV BLD AUTO: 9.6 FL (ref 9.2–12.9)
PMV BLD AUTO: 9.6 FL (ref 9.2–12.9)
POIKILOCYTOSIS BLD QL SMEAR: SLIGHT
POIKILOCYTOSIS BLD QL SMEAR: SLIGHT
POTASSIUM SERPL-SCNC: 4.2 MMOL/L (ref 3.5–5.1)
POTASSIUM SERPL-SCNC: 4.2 MMOL/L (ref 3.5–5.1)
RBC # BLD AUTO: 3.67 M/UL (ref 4.6–6.2)
RBC # BLD AUTO: 3.67 M/UL (ref 4.6–6.2)
SODIUM SERPL-SCNC: 133 MMOL/L (ref 136–145)
SODIUM SERPL-SCNC: 133 MMOL/L (ref 136–145)
WBC # BLD AUTO: 19.11 K/UL (ref 3.9–12.7)
WBC # BLD AUTO: 19.11 K/UL (ref 3.9–12.7)

## 2019-07-22 PROCEDURE — 97110 THERAPEUTIC EXERCISES: CPT

## 2019-07-22 PROCEDURE — 63600175 PHARM REV CODE 636 W HCPCS: Performed by: NURSE PRACTITIONER

## 2019-07-22 PROCEDURE — 94640 AIRWAY INHALATION TREATMENT: CPT

## 2019-07-22 PROCEDURE — 97116 GAIT TRAINING THERAPY: CPT

## 2019-07-22 PROCEDURE — 96376 TX/PRO/DX INJ SAME DRUG ADON: CPT

## 2019-07-22 PROCEDURE — 63600175 PHARM REV CODE 636 W HCPCS: Performed by: HOSPITALIST

## 2019-07-22 PROCEDURE — 99204 PR OFFICE/OUTPT VISIT, NEW, LEVL IV, 45-59 MIN: ICD-10-PCS | Mod: ,,, | Performed by: INTERNAL MEDICINE

## 2019-07-22 PROCEDURE — 82550 ASSAY OF CK (CPK): CPT

## 2019-07-22 PROCEDURE — 85025 COMPLETE CBC W/AUTO DIFF WBC: CPT

## 2019-07-22 PROCEDURE — 96372 THER/PROPH/DIAG INJ SC/IM: CPT

## 2019-07-22 PROCEDURE — 99204 OFFICE O/P NEW MOD 45 MIN: CPT | Mod: ,,, | Performed by: INTERNAL MEDICINE

## 2019-07-22 PROCEDURE — 80069 RENAL FUNCTION PANEL: CPT

## 2019-07-22 PROCEDURE — 99900035 HC TECH TIME PER 15 MIN (STAT)

## 2019-07-22 PROCEDURE — 25000003 PHARM REV CODE 250: Performed by: HOSPITALIST

## 2019-07-22 PROCEDURE — 97110 THERAPEUTIC EXERCISES: CPT | Performed by: PHYSICAL THERAPIST

## 2019-07-22 PROCEDURE — 36415 COLL VENOUS BLD VENIPUNCTURE: CPT

## 2019-07-22 PROCEDURE — G0378 HOSPITAL OBSERVATION PER HR: HCPCS

## 2019-07-22 PROCEDURE — 83735 ASSAY OF MAGNESIUM: CPT

## 2019-07-22 PROCEDURE — 27000221 HC OXYGEN, UP TO 24 HOURS

## 2019-07-22 PROCEDURE — 94761 N-INVAS EAR/PLS OXIMETRY MLT: CPT

## 2019-07-22 PROCEDURE — 25000242 PHARM REV CODE 250 ALT 637 W/ HCPCS: Performed by: NURSE PRACTITIONER

## 2019-07-22 PROCEDURE — 96361 HYDRATE IV INFUSION ADD-ON: CPT

## 2019-07-22 PROCEDURE — 25000003 PHARM REV CODE 250: Performed by: NURSE PRACTITIONER

## 2019-07-22 RX ADMIN — ATORVASTATIN CALCIUM 10 MG: 10 TABLET, FILM COATED ORAL at 08:07

## 2019-07-22 RX ADMIN — METHYLPREDNISOLONE SODIUM SUCCINATE 40 MG: 40 INJECTION, POWDER, FOR SOLUTION INTRAMUSCULAR; INTRAVENOUS at 05:07

## 2019-07-22 RX ADMIN — IPRATROPIUM BROMIDE AND ALBUTEROL SULFATE 3 ML: .5; 3 SOLUTION RESPIRATORY (INHALATION) at 04:07

## 2019-07-22 RX ADMIN — IPRATROPIUM BROMIDE AND ALBUTEROL SULFATE 3 ML: .5; 3 SOLUTION RESPIRATORY (INHALATION) at 11:07

## 2019-07-22 RX ADMIN — CHLORDIAZEPOXIDE HYDROCHLORIDE 10 MG: 10 CAPSULE ORAL at 09:07

## 2019-07-22 RX ADMIN — HEPARIN SODIUM 5000 UNITS: 5000 INJECTION, SOLUTION INTRAVENOUS; SUBCUTANEOUS at 09:07

## 2019-07-22 RX ADMIN — IPRATROPIUM BROMIDE AND ALBUTEROL SULFATE 3 ML: .5; 3 SOLUTION RESPIRATORY (INHALATION) at 03:07

## 2019-07-22 RX ADMIN — CHLORDIAZEPOXIDE HYDROCHLORIDE 10 MG: 10 CAPSULE ORAL at 08:07

## 2019-07-22 RX ADMIN — IPRATROPIUM BROMIDE AND ALBUTEROL SULFATE 3 ML: .5; 3 SOLUTION RESPIRATORY (INHALATION) at 08:07

## 2019-07-22 RX ADMIN — SODIUM CHLORIDE: 0.9 INJECTION, SOLUTION INTRAVENOUS at 12:07

## 2019-07-22 RX ADMIN — IPRATROPIUM BROMIDE AND ALBUTEROL SULFATE 3 ML: .5; 3 SOLUTION RESPIRATORY (INHALATION) at 07:07

## 2019-07-22 RX ADMIN — BUDESONIDE 0.5 MG: 0.5 SUSPENSION RESPIRATORY (INHALATION) at 08:07

## 2019-07-22 RX ADMIN — BUPROPION HYDROCHLORIDE 300 MG: 150 TABLET, EXTENDED RELEASE ORAL at 08:07

## 2019-07-22 RX ADMIN — ARFORMOTEROL TARTRATE 15 MCG: 15 SOLUTION RESPIRATORY (INHALATION) at 08:07

## 2019-07-22 RX ADMIN — AMLODIPINE BESYLATE 2.5 MG: 2.5 TABLET ORAL at 08:07

## 2019-07-22 RX ADMIN — METHYLPREDNISOLONE SODIUM SUCCINATE 40 MG: 40 INJECTION, POWDER, FOR SOLUTION INTRAMUSCULAR; INTRAVENOUS at 11:07

## 2019-07-22 RX ADMIN — TAMSULOSIN HYDROCHLORIDE 0.4 MG: 0.4 CAPSULE ORAL at 08:07

## 2019-07-22 RX ADMIN — BUDESONIDE 0.5 MG: 0.5 SUSPENSION RESPIRATORY (INHALATION) at 07:07

## 2019-07-22 RX ADMIN — HEPARIN SODIUM 5000 UNITS: 5000 INJECTION, SOLUTION INTRAVENOUS; SUBCUTANEOUS at 01:07

## 2019-07-22 RX ADMIN — HEPARIN SODIUM 5000 UNITS: 5000 INJECTION, SOLUTION INTRAVENOUS; SUBCUTANEOUS at 05:07

## 2019-07-22 RX ADMIN — IPRATROPIUM BROMIDE AND ALBUTEROL SULFATE 3 ML: .5; 3 SOLUTION RESPIRATORY (INHALATION) at 12:07

## 2019-07-22 RX ADMIN — ARFORMOTEROL TARTRATE 15 MCG: 15 SOLUTION RESPIRATORY (INHALATION) at 07:07

## 2019-07-22 RX ADMIN — ASPIRIN 81 MG: 81 TABLET, COATED ORAL at 08:07

## 2019-07-22 NOTE — PT/OT/SLP PROGRESS
"Occupational Therapy  Treatment    Troy Chau   MRN: [id number]   Admitting Diagnosis: Acute renal failure due to traumatic rhabdomyolysis    OT Date of Treatment: 07/22/19   OT Start Time: 1110  OT Stop Time: 1125  OT Total Time (min): 15 min    Billable Minutes:  Therapeutic Exercise 15 min    General Precautions: Standard, fall, respiratory, hearing impaired  Orthopedic Precautions: N/A  Braces: N/A         Subjective:  Communicated with Nurse Lynn and epic chart review prior to session.  Pt found sitting in chair and only agreeable to (B) UE ROM exercises at this time.   Pain/Comfort  Pain Rating 1: 10/10  Location - Side 1: Right  Location 1: shoulder  Pain Addressed 1: Reposition, Distraction, Cessation of Activity  Pain Rating Post-Intervention 1: 10/10    Objective:  Patient found with: peripheral IV, telemetry     Functional Mobility:   Therapeutic Activities and Exercises:  Pt performed (B) UE ROM exercises in sitting 1 x 15 reps: bicep curls, forearm sup/ pronation, finger flex/ ext, shoulder abd, attempted shoulder flex but had increased pain.      AM-PAC 6 CLICK ADL   How much help from another person does this patient currently need?   1 = Unable, Total/Dependent Assistance  2 = A lot, Maximum/Moderate Assistance  3 = A little, Minimum/Contact Guard/Supervision  4 = None, Modified [place]/Independent    Putting on and taking off regular lower body clothing? : 2  Bathing (including washing, rinsing, drying)?: 2  Toileting, which includes using toilet, bedpan, or urinal? : 2  Putting on and taking off regular upper body clothing?: 2  Taking care of personal grooming such as brushing teeth?: 3  Eating meals?: 2  Daily Activity Total Score: 13     AM-PAC Raw Score CMS "G-Code Modifier Level of Impairment Assistance   6 % Total / Unable   7 - 8 CM 80 - 100% Maximal Assist   9-13 CL 60 - 80% Moderate Assist   14 - 19 CK 40 - 60% Moderate Assist   20 - 22 CJ 20 - 40% Minimal Assist   23 CI " 1-20% SBA / CGA   24 CH 0% Independent/ Mod I       Patient left up in chair with all lines intact, call button in reach, chair alarm on and Nurse Leah notified about increased (R) Shoulder pain and decreased mobility.    ASSESSMENT:  Troy Chau is a 83 y.o. male with a medical diagnosis of Acute renal failure due to traumatic rhabdomyolysis and presents with impaired functional mobility and ADLs. Pt will benefit from continued skilled OT in order to address impairments.    Rehab identified problem list/impairments: Rehab identified problem list/impairments: weakness, impaired endurance, impaired self care skills, impaired functional mobilty, decreased coordination, edema, pain, decreased safety awareness, gait instability, impaired balance, decreased upper extremity function    Rehab potential is fair.    Activity tolerance: Fair    Discharge recommendations: Discharge Facility/Level of Care Needs: nursing facility, skilled     Barriers to discharge: Barriers to Discharge: Decreased caregiver support    Equipment recommendations: walker, rolling     GOALS:   Multidisciplinary Problems     Occupational Therapy Goals        Problem: Occupational Therapy Goal    Goal Priority Disciplines Outcome Interventions   Occupational Therapy Goal     OT, PT/OT Ongoing (interventions implemented as appropriate)    Description:  LTGs to be met by 7/27/19  1. Pt will perform BSC t/fs with Mod A  2. Pt will perform LE dressing with Mod A  3. Pt will perform UE dressing with Mod A  4. Pt will perform (B( UE ROM exercises 1 x 15 reps                    Plan:  Patient to be seen 3 x/week to address the above listed problems via self-care/home management, therapeutic activities, therapeutic exercises  Plan of Care expires: 07/22/19  Plan of Care reviewed with: patient    OT G-codes  Functional Assessment Tool Used: [facility]  Score: 15    Stefanie Jacob, PT/OT  07/22/2019

## 2019-07-22 NOTE — CONSULTS
[facility] -   Pulmonology  Consult Note    Patient Name: Troy Chau  MRN: [id number]  Admission Date: 7/19/2019  Hospital Length of Stay: 0 days  Code Status: Full Code  Attending Physician: Troy Vogel MD  Primary Care Provider: Tanvir Petersen MD   Principal Problem: Acute renal failure due to traumatic rhabdomyolysis      Subjective:     HPI:  83 year old male who  has a past medical history of Arthritis, BPH (benign prostatic hyperplasia), COPD (chronic obstructive pulmonary disease), Depression, Hyperlipidemia, Hyperlipidemia, Hypertension, and Smoker admitted to regular medical floor  s/p mechanical fall.  Incurred multiple abrasions involving both arms, both knees, hip and back, as well as head trauma.  Denies LOC, dizziness, seizure-like activity, muscle weakness, numbness, HA, SOB, CP, ab pain, constipation/diarrhea, recent illness. Pulmonary consulted for wheezing, rhonchi and history of smoking.     Past Medical History:   Diagnosis Date    Arthritis     BPH (benign prostatic hyperplasia)     COPD (chronic obstructive pulmonary disease)     Depression     Hyperlipidemia     Hyperlipidemia     Hypertension     Smoker     2 pack a day       Past Surgical History:   Procedure Laterality Date    APPENDECTOMY      APPENDECTOMY      CHOLECYSTECTOMY      CHOLECYSTECTOMY, LAPAROSCOPIC N/A 6/10/2013    Performed by Louis O. Jeansonne IV, MD at [facility] OR    COLONOSCOPY      EYE SURGERY         Review of patient's allergies indicates:  No Known Allergies     Scheduled Meds:   albuterol-ipratropium  3 mL Nebulization Q4H    amLODIPine  2.5 mg Oral Daily    arformoterol  15 mcg Nebulization Q12H    aspirin  81 mg Oral Daily    atorvastatin  10 mg Oral Daily    budesonide  0.5 mg Nebulization Q12H    buPROPion  300 mg Oral Daily    chlordiazepoxide  10 mg Oral BID    heparin (porcine)  5,000 Units Subcutaneous Q8H    methylPREDNISolone sodium succinate  40 mg Intravenous Q6H     tamsulosin  0.4 mg Oral Daily     Continuous Infusions:   sodium chloride 0.9% 50 mL/hr at 07/22/19 1203     PRN Meds:.dextrose 50%, dextrose 50%, glucagon (human recombinant), glucose, glucose, HYDROcodone-acetaminophen, ondansetron, promethazine (PHENERGAN) IVPB, sodium chloride 0.9%    Family History     Problem Relation (Age of Onset)    Diabetes Mother    Heart disease Father        Tobacco Use    Smoking status: Current Every Day Smoker     Packs/day: 2.00     Years: 60.00     Pack years: 120.00    Smokeless tobacco: Never Used   Substance and Sexual Activity    Alcohol use: Yes     Comment: daily bourbon    Drug use: No    Sexual activity: Not on file         Review of Systems   Constitutional: Positive for activity change and fatigue. Negative for chills, diaphoresis and fever.   HENT: Negative for congestion, ear discharge, rhinorrhea, sinus pressure, sore throat and trouble swallowing.    Eyes: Negative for discharge and visual disturbance.   Respiratory: Positive for shortness of breath. Negative for apnea, cough, choking, chest tightness, wheezing and stridor.    Cardiovascular: Negative for chest pain, palpitations and leg swelling.   Gastrointestinal: Negative for abdominal distention, abdominal pain, diarrhea, nausea and vomiting.   Endocrine: Negative for cold intolerance and heat intolerance.   Genitourinary: Negative for dysuria, frequency and hematuria.   Musculoskeletal: Negative for arthralgias, back pain, myalgias and neck pain.   Skin: Negative for pallor and rash.   Neurological: Positive for weakness. Negative for dizziness, seizures, syncope and headaches.   Psychiatric/Behavioral: Negative for agitation, confusion and sleep disturbance.     Objective:     Vital Signs (Most Recent):  Temp: 98 °F (36.7 °C) (07/22/19 1604)  Pulse: 66 (07/22/19 1705)  Resp: 18 (07/22/19 1604)  BP: (!) 143/64 (07/22/19 1604)  SpO2: 99 % (07/22/19 1604) Vital Signs (24h Range):  Temp:  [97.7 °F (36.5  °C)-98.3 °F (36.8 °C)] 98 °F (36.7 °C)  Pulse:  [66-88] 66  Resp:  [16-24] 18  SpO2:  [95 %-99 %] 99 %  BP: (119-143)/(58-71) 143/64     Weight: 83.9 kg (185 lb)  Body mass index is 31.76 kg/m².      Intake/Output Summary (Last 24 hours) at 7/22/2019 1748  Last data filed at 7/22/2019 1617  Gross per 24 hour   Intake 1716.66 ml   Output --   Net 1716.66 ml       Physical Exam   Constitutional: He is oriented to person, place, and time. He has a sickly appearance. No distress. Nasal cannula in place.   HENT:   Mouth/Throat: No oropharyngeal exudate.   Eyes: Right eye exhibits no discharge. Left eye exhibits no discharge.   Neck: No JVD present.   Cardiovascular: Exam reveals no gallop and no friction rub.   No murmur heard.  Pulmonary/Chest: No stridor. No respiratory distress. He has no decreased breath sounds. He has wheezes (throughout ). He has rhonchi (throughout ). He has no rales. He exhibits no tenderness.   Abdominal: He exhibits no distension and no mass. There is no tenderness. There is no rebound and no guarding. No hernia.   Large abdomen      Musculoskeletal: He exhibits no edema or deformity.   Neurological: He is alert and oriented to person, place, and time.   Skin: Skin is warm and dry. Capillary refill takes less than 2 seconds. He is not diaphoretic.   Nursing note and vitals reviewed.      Vents:  Oxygen Concentration (%): 32 (07/22/19 1526)    Lines/Drains/Airways     Peripheral Intravenous Line                 Peripheral IV - Single Lumen 07/20/19 0250 20 G Right Antecubital 2 days         Peripheral IV - Single Lumen 07/20/19 0300 20 G Left Forearm 2 days                Significant Labs:    CBC/Anemia Profile:  Recent Labs   Lab 07/21/19  0841 07/22/19  0532   WBC 16.78* 19.11*  19.11*   HGB 12.0* 10.2*  10.2*   HCT 36.8* 31.9*  31.9*    227  227   MCV 88 87  87   RDW 15.5* 15.2*  15.2*        Chemistries:  Recent Labs   Lab 07/21/19  0841 07/22/19  0532   *  133* 133*   133*   K 3.9  3.9 4.2  4.2     101 101  101   CO2 20*  20* 23  23   BUN 22  22 25*  25*   CREATININE 1.0  1.0 0.9  0.9   CALCIUM 8.8  8.8 8.4*  8.4*   ALBUMIN 3.1*  3.1* 2.8*   PROT 6.7  --    BILITOT 0.5  --    ALKPHOS 98  --    ALT 29  --    AST 50*  --    MG 2.0 2.1   PHOS 2.9 2.8  2.8         Significant Imaging:     CXR: 07/20/19:  Stable mild cardiomegaly.  Prominent left epicardial fat pad is again incidentally noted..  The lungs appear clear of active disease. No acute appearing infiltrate, pleural effusion or pneumothorax identified.    CT CHEST WITHOUT CONTRAST: 07/20/19:  Base of Neck: No significant abnormality.    Thoracic soft tissues: Normal.    Aorta: Left-sided aortic arch.  No aneurysm and no significant atherosclerosis    Heart: Normal size. No effusion.    Pulmonary vasculature: Pulmonary arteries distribute normally.  There are four pulmonary veins.  Amita/Mediastinum: No pathologic rudy enlargement.    Airways: Patent.    Lungs/Pleura: Clear lungs. No pleural effusion or thickening.  Prominent left pericardial fat pad accounts for chest radiograph appearance.    Esophagus: Normal.    Upper Abdomen: No abnormality of the partially imaged upper abdomen.  Cholecystectomy clips.    Bones: No acute fracture. No suspicious lytic or sclerotic lesions.            ABG  Recent Labs   Lab 07/21/19  1031   PH 7.341*   PO2 74*   PCO2 41.9   HCO3 22.6*   BE -3     Assessment/Plan:     COPD (chronic obstructive pulmonary disease)  Start Oral Antibiotics - Azithromycin                                    Continue with FORMOTEROL, DUONEB, PULMICORT.        Continue IV glucocorticoids        Consider nighttime BiPAP 14/8.                                               Smoker  Smoking cessation counseling.           Thank you for your consult. I will follow-up with patient. Please contact us if you have any additional questions.     Cornelius White MD  Pulmonology  [facility] -  [place]

## 2019-07-22 NOTE — PT/OT/SLP PROGRESS
Physical Therapy  Treatment    Troy Chau   MRN: [id number]   Admitting Diagnosis: Acute renal failure due to traumatic rhabdomyolysis    PT Received On: 07/22/19  PT Start Time: 0835     PT Stop Time: 0900    PT Total Time (min): 25 min       Billable Minutes:  Gait Training 15 and Therapeutic Exercise 10    Treatment Type: Treatment  PT/PTA: PT             General Precautions: Standard, fall, respiratory  Orthopedic Precautions: N/A   Braces: N/A         Subjective:  Communicated with NURSE AND Epic CHART REVIEW prior to session.  PT AGREED TO TX     Pain/Comfort  Pain Rating 1: 5/10  Location - Side 1: Right  Location 1: arm  Pain Rating Post-Intervention 1: 5/10    Objective:   Patient found with: peripheral IV, telemetry    Functional Mobility:  PT MET IN RM SUP IN BED AND AGREED TO TX. PT SUP>SIT EOB WITH MAX A. PT WITH DEC USE OF R UE FUNCTION. PT SCOOTED TO EOB WITH MOD A. PT STOOD WITH RW AND MOD A FOR GT TRAINING. PT GT TRAINED X 25' WITH RW AND MOD A X X2. PT RETURNED TO RM T/F TO CHAIR WITH RW AND MOD A. PT WITH LABORED BREATHING. PT SIT>STAND X 5 REPS WITH CUES FOR RW USE WITH MOD A. PT LEFT SEATED FOR REST AND COMPLETED B LE TE X 15 REPS OF MIP, TKE, AND AP. PT LEFT SEATED IN CHAIR WITH ALL NEEDS MET.     AM-PAC 6 CLICK MOBILITY  How much help from another person does this patient currently need?   1 = Unable, Total/Dependent Assistance  2 = A lot, Maximum/Moderate Assistance  3 = A little, Minimum/Contact Guard/Supervision  4 = None, Modified [place]/Independent    Turning over in bed (including adjusting bedclothes, sheets and blankets)?: 2  Sitting down on and standing up from a chair with arms (e.g., wheelchair, bedside commode, etc.): 2  Moving from lying on back to sitting on the side of the bed?: 2  Moving to and from a bed to a chair (including a wheelchair)?: 2  Need to walk in hospital room?: 2  Climbing 3-5 steps with a railing?: 1  Basic Mobility Total Score: 11    AM-PAC Raw Score  CMS G-Code Modifier Level of Impairment Assistance   6 % Total / Unable   7 - 9 CM 80 - 100% Maximal Assist   10 - 14 CL 60 - 80% Moderate Assist   15 - 19 CK 40 - 60% Moderate Assist   20 - 22 CJ 20 - 40% Minimal Assist   23 CI 1-20% SBA / CGA   24 CH 0% Independent/ Mod I     Patient left up in chair with call button in reach and chair alarm on.    Assessment:  PT PROGRESSING WITH GT.     Rehab identified problem list/impairments: Rehab identified problem list/impairments: weakness, gait instability, decreased upper extremity function, decreased lower extremity function, impaired balance, impaired endurance, impaired self care skills, impaired functional mobilty, decreased coordination, pain, decreased safety awareness    Rehab potential is good.    Activity tolerance: Fair    Discharge recommendations: Discharge Facility/Level of Care Needs: nursing facility, skilled     Barriers to discharge:      Equipment recommendations: Equipment Needed After Discharge: walker, rolling     GOALS:   Multidisciplinary Problems     Physical Therapy Goals        Problem: Physical Therapy Goal    Goal Priority Disciplines Outcome Goal Variances Interventions   Physical Therapy Goal     PT, PT/OT Ongoing (interventions implemented as appropriate)     Description:  1. Patient will perform supine to/from sit mod A  2. Patient will perform sit to/from stand with RW mod A  3. Patient will amb 50ft RW mod A                    PLAN:    Patient to be seen 5 x/week  to address the above listed problems via gait training, therapeutic activities, therapeutic exercises  Plan of Care expires: 07/27/19  Plan of Care reviewed with: patient         Rosanne Hurd, PT  07/22/2019

## 2019-07-22 NOTE — PLAN OF CARE
Problem: Adult Inpatient Plan of Care  Goal: Plan of Care Review  Outcome: Ongoing (interventions implemented as appropriate)  Patient remains free of falls and safety precautions maintained. Afebrile. Pain controlled. 3L NC. IV fluids per order. NSR. Patient seen by wound care, will be daily dressing changes. Oriented and answers all questions appropriately. Out of bed majority of day. Will continue to monitor. 12 hour chart check.

## 2019-07-22 NOTE — HPI
83 year old male who  has a past medical history of Arthritis, BPH (benign prostatic hyperplasia), COPD (chronic obstructive pulmonary disease), Depression, Hyperlipidemia, Hyperlipidemia, Hypertension, and Smoker admitted to regular medical floor  s/p mechanical fall.  Incurred multiple abrasions involving both arms, both knees, hip and back, as well as head trauma.  Denies LOC, dizziness, seizure-like activity, muscle weakness, numbness, HA, SOB, CP, ab pain, constipation/diarrhea, recent illness. Pulmonary consulted for wheezing, rhonchi and history of smoking.

## 2019-07-22 NOTE — PLAN OF CARE
Problem: Occupational Therapy Goal  Goal: Occupational Therapy Goal  LTGs to be met by 7/27/19  1. Pt will perform BSC t/fs with Mod A  2. Pt will perform LE dressing with Mod A  3. Pt will perform UE dressing with Mod A  4. Pt will perform (B( UE ROM exercises 1 x 15 reps   Outcome: Ongoing (interventions implemented as appropriate)  (B) UE ROM Exercises 1 x 15 reps; increased pain in (R) shoulder

## 2019-07-22 NOTE — ASSESSMENT & PLAN NOTE
- S/p mechanical fall  - CPK 1000, Cr 2.2, baseline Cr 0.8  - Continue IVF hydration  - replete electrolytes PRN  - monitor I/O  - daily renal panel and cpk    7/21  CK now trending downward  Creatinine 1.0 today   Renal function at baseline   Will continue gentle hydration   Monitor     Creatinine 0.9 today   ADRIAN resolved

## 2019-07-22 NOTE — PLAN OF CARE
Problem: Physical Therapy Goal  Goal: Physical Therapy Goal  1. Patient will perform supine to/from sit mod A  2. Patient will perform sit to/from stand with RW mod A  3. Patient will amb 50ft RW mod A   Outcome: Ongoing (interventions implemented as appropriate)  PT GT TRAINED X 25' WITH RW AND MOD A, UNSTEADY GT

## 2019-07-22 NOTE — PROGRESS NOTES
CM followed up with pt and his niece Trinidad.  Preference form signed for [facility].  CM faxed referrals.      [facility] no beds available.  Awaiting follow up from [facility].      Completed LOCET and faxed [facility].  Awaiting 142.      1437:  142 received.  [facility] followed up and can accept the pt.  Will discuss with pt and family.

## 2019-07-22 NOTE — ASSESSMENT & PLAN NOTE
Current smoker  duonebs prn sob/wheezing    7/21  Pt having continued increase wheezing, current smoker   Continue Duo nebs and IV steriods   Start LABA and ICS  Monitor     7/22  Pt with continued wheezing and rhonchi   Continue SUZIE, LABA, ICS and IV steroids  Monitor

## 2019-07-22 NOTE — PROGRESS NOTES
[facility] Hospital Medicine  Progress Note    Patient Name: Troy Chau  MRN: [id number]  Patient Class: OP- Observation   Admission Date: 7/19/2019  Length of Stay: 0 days  Attending Physician: Troy Vogel MD  Primary Care Provider: Tanvir Petersen MD        Subjective:     Principal Problem:Acute renal failure due to traumatic rhabdomyolysis      HPI:  83M h/o HTN, HLD, COPD, and BPH presents s/p mechanical fall.  Incurred multiple abrasions involving both arms, both knees, hip and back, as well as head trauma.  Denies LOC, dizziness, seizure-like activity, muscle weakness, numbness, HA, SOB, CP, ab pain, constipation/diarrhea, recent illness.  In ER, vitals stable.  Imaging of head, both arms, both knees were negative for acute fx or process.  CXR show increase in left lower lobe opacity.   Wbc 26, BUN 29, Cr 2.2, CPK 1000.   ER physician (Justina) gave patient 30ml/kg fluid bolus and zosyn IV bolus with concerned for sepsis.  Because CXR findings more consistent with pleural effusion than pnuemonia, CT chest requested by Hospital Medicine to not only locate a source of infection but also evaluate for pulmonary hemorrhage, hemothorax  s/p fall and trauma.  CT chest statrad reading was negative for any acute findings suggesting hemothorax, pulmonary hemorrhage, significant pleural effusion or pneumonia.   Patient placed in observation for ADRIAN due to traumatic rhabdomyolysis s/p mechanical fall.     Overview/Hospital Course:  On 7/19 patient was placed in OBS secondary to ADRIAN associated with traumatic rhabdomyolysis after falling at home.  Cr elevated to 2.2, baseline is around 0.8.  Also noted to have an elevated WBC count of 18k which was thought to be r/t stress reaction as CT chest and UA were both negative for acute pulmonary process.  Patient also afebrile.  Patient also noted to have a mildly elevated troponin of 0.54.  12 lead EKG showed NSR with no significant change.  Patient was  given empiric ABX in the ER and started on IVFs.  PT/OT consulted given recent fall and recommended SNF placement.  CM consulted to assist with DC planning.    As of 7/21, patient seen and examined this AM, he was initially difficult to arouse this after receiving Ativan. O 2 sats and ABG stable. He later wore up and became somewhat combative, however patient now stable with family member at bedside. ADRIAN has resolved with IVF. Wheezing improved after adding LABA and ICS, continue current management.     As of 7/22, patient more awake and alerts, sitting up in chair. Continue to experiencing wheezing and rhonchi, O 2 at 2-3 L. Vital signs stable. Renal function stable, CK trending downward. Awaiting SNF placement.     Interval History: Pt seen and examined, continue to experience wheezing and rhonchi. Pulmonary consult. Awaiting SNF placement.      Review of Systems   Constitutional: Positive for activity change and fatigue. Negative for chills, diaphoresis and fever.   HENT: Negative for congestion, ear discharge, rhinorrhea, sinus pressure, sore throat and trouble swallowing.    Eyes: Negative for discharge and visual disturbance.   Respiratory: Positive for shortness of breath. Negative for apnea, cough, choking, chest tightness, wheezing and stridor.    Cardiovascular: Negative for chest pain, palpitations and leg swelling.   Gastrointestinal: Negative for abdominal distention, abdominal pain, diarrhea, nausea and vomiting.   Endocrine: Negative for cold intolerance and heat intolerance.   Genitourinary: Negative for dysuria, frequency and hematuria.   Musculoskeletal: Negative for arthralgias, back pain, myalgias and neck pain.   Skin: Negative for pallor and rash.   Neurological: Positive for weakness. Negative for dizziness, seizures, syncope and headaches.   Psychiatric/Behavioral: Negative for agitation, confusion and sleep disturbance.     Objective:     Vital Signs (Most Recent):  Temp: 98 °F (36.7 °C)  (07/22/19 1304)  Pulse: 77 (07/22/19 1306)  Resp: 16 (07/22/19 1304)  BP: (!) 129/58 (07/22/19 1304)  SpO2: 98 % (07/22/19 1304) Vital Signs (24h Range):  Temp:  [97.7 °F (36.5 °C)-98.3 °F (36.8 °C)] 98 °F (36.7 °C)  Pulse:  [66-88] 77  Resp:  [16-24] 16  SpO2:  [95 %-98 %] 98 %  BP: (119-138)/(58-71) 129/58     Weight: 83.9 kg (185 lb)  Body mass index is 31.76 kg/m².    Intake/Output Summary (Last 24 hours) at 7/22/2019 1407  Last data filed at 7/22/2019 1207  Gross per 24 hour   Intake 1508.33 ml   Output --   Net 1508.33 ml      Physical Exam   Constitutional: He is oriented to person, place, and time. He has a sickly appearance. No distress. Nasal cannula in place.   HENT:   Mouth/Throat: No oropharyngeal exudate.   Eyes: Right eye exhibits no discharge. Left eye exhibits no discharge.   Neck: No JVD present.   Cardiovascular: Exam reveals no gallop and no friction rub.   No murmur heard.  Pulmonary/Chest: No stridor. No respiratory distress. He has no decreased breath sounds. He has wheezes (throughout ). He has rhonchi (throughout ). He has no rales. He exhibits no tenderness.   Abdominal: He exhibits no distension and no mass. There is no tenderness. There is no rebound and no guarding. No hernia.   Large abdomen      Musculoskeletal: He exhibits no edema or deformity.   Neurological: He is alert and oriented to person, place, and time.   Skin: Skin is warm and dry. Capillary refill takes less than 2 seconds. He is not diaphoretic.   Nursing note and vitals reviewed.      Significant Labs:   CBC:   Recent Labs   Lab 07/21/19  0841 07/22/19  0532   WBC 16.78* 19.11*  19.11*   HGB 12.0* 10.2*  10.2*   HCT 36.8* 31.9*  31.9*    227  227     CMP:   Recent Labs   Lab 07/21/19  0841 07/22/19  0532   *  133* 133*  133*   K 3.9  3.9 4.2  4.2     101 101  101   CO2 20*  20* 23  23   *  246* 205*  205*   BUN 22  22 25*  25*   CREATININE 1.0  1.0 0.9  0.9   CALCIUM 8.8  8.8  8.4*  8.4*   PROT 6.7  --    ALBUMIN 3.1*  3.1* 2.8*   BILITOT 0.5  --    ALKPHOS 98  --    AST 50*  --    ALT 29  --    ANIONGAP 12  12 9  9   EGFRNONAA >60  >60 >60  >60         Significant Imaging:     Imaging Results          CT Chest Without Contrast (Final result)  Result time 07/20/19 09:02:18    Final result by Marco Paula MD (07/20/19 09:02:18)                 Impression:      No acute cardiopulmonary process.      Electronically signed by: Marco Paula  Date:    07/20/2019  Time:    09:02             Narrative:    EXAMINATION:  CT CHEST WITHOUT CONTRAST    CLINICAL HISTORY:  Sepsis;    TECHNIQUE:  Low dose axial images, sagittal and coronal reformations were obtained from the thoracic inlet to the lung bases. Contrast was not administered.  All CT scans at this location are performed using dose modulation techniques as appropriate to a performed exam including the following: Automated exposure control; adjustment of the mA and/or kV  according to patient size.    COMPARISON:  07/20/2019    FINDINGS:  Base of Neck: No significant abnormality.    Thoracic soft tissues: Normal.    Aorta: Left-sided aortic arch.  No aneurysm and no significant atherosclerosis    Heart: Normal size. No effusion.    Pulmonary vasculature: Pulmonary arteries distribute normally.  There are four pulmonary veins.    Amita/Mediastinum: No pathologic rudy enlargement.    Airways: Patent.    Lungs/Pleura: Clear lungs. No pleural effusion or thickening.  Prominent left pericardial fat pad accounts for chest radiograph appearance.    Esophagus: Normal.    Upper Abdomen: No abnormality of the partially imaged upper abdomen.  Cholecystectomy clips.    Bones: No acute fracture. No suspicious lytic or sclerotic lesions.                               X-Ray Humerus 2 View Bilateral (Final result)  Result time 07/20/19 08:26:04    Final result by Marco Paula MD (07/20/19 08:26:04)                 Impression:      As  above.      Electronically signed by: Marco Paula  Date:    07/20/2019  Time:    08:26             Narrative:    EXAMINATION:  XR HUMERUS 2 VIEW BILATERAL    CLINICAL HISTORY:  bilateral elbow pain;    TECHNIQUE:  Bilateral humeral radiographs    COMPARISON:  None    FINDINGS:  Questionable linear lucency of the distal left humerus near the ulnohumeral joint raising question of fracture, only appreciated on the lateral view.  Please correlate with point tenderness.  Recommend dedicated left elbow radiographs.    Right humerus grossly unremarkable.                               X-Ray Hips Bilateral 2 View Incl AP Pelvis (Final result)  Result time 07/20/19 08:23:01    Final result by Marco Paula MD (07/20/19 08:23:01)                 Impression:      No acute abnormality.      Electronically signed by: Marco Paula  Date:    07/20/2019  Time:    08:23             Narrative:    EXAMINATION:  XR HIPS BILATERAL 2 VIEW INCL AP PELVIS    CLINICAL HISTORY:  Pain in right hip    TECHNIQUE:  AP view of the pelvis and frogleg lateral views of both hips were performed.    COMPARISON:  None.    FINDINGS:  No evidence of fracture or dislocation.  Mild hip joint space narrowing.  SI joints unremarkable.  Degenerative changes lumbar spine.  Moderate volume stool overlying the rectum.                               X-Ray Knee 1 or 2 View Bilateral (Final result)  Result time 07/20/19 08:15:18    Final result by Marco Paula MD (07/20/19 08:15:18)                 Impression:      No acute abnormality identified.      Electronically signed by: Marco Paula  Date:    07/20/2019  Time:    08:15             Narrative:    EXAMINATION:  XR KNEE 1 OR 2 VIEW BILATERAL    CLINICAL HISTORY:  Abrasion, right knee, initial encounter    TECHNIQUE:  AP, lateral, and Merchant views of the bilateral knees were performed.    COMPARISON:  None    FINDINGS:  No evidence of fracture or dislocation.  No significant suprapatellar knee effusion.     Soft tissues unremarkable.  No foreign body.    Right knee, moderate medial compartment narrowing with trace calcification of the medial and lateral menisci/cartilage.  Tricompartmental osteophytes.    Left knee, severe bone on bone medial compartment narrowing with articular surface sclerosis and mild varus angulation at the knee joint.  Tricompartmental osteophytes.                               X-Ray Chest AP Portable (Final result)  Result time 07/20/19 08:13:26    Final result by Marco Paula MD (07/20/19 08:13:26)                 Impression:      As above.      Electronically signed by: Marco Paula  Date:    07/20/2019  Time:    08:13             Narrative:    EXAMINATION:  XR CHEST AP PORTABLE    CLINICAL HISTORY:  wheezing;.    TECHNIQUE:  Single frontal portable view of the chest was performed.    COMPARISON:  None    FINDINGS:  Support devices: None    Suggestion of moderate volume left pleural effusion.  Left heart border is obscured.  Airspace disease not excluded left perihilar region left lung base.  Tiny right pleural effusion.    Bones are intact.                               X-Ray Forearm Bilateral (Final result)  Result time 07/20/19 08:12:08    Final result by Marco Paula MD (07/20/19 08:12:08)                 Impression:      No acute fracture.      Electronically signed by: Marco Paula  Date:    07/20/2019  Time:    08:12             Narrative:    EXAMINATION:  XR FOREARM BILATERAL    CLINICAL HISTORY:  bilateral elbow pain;    TECHNIQUE:  Radiographs of the left and right elbows    COMPARISON:  None    FINDINGS:  Mild degenerative change bilateral radiohumeral joints.  At the left elbow, chronic appearing posttraumatic change along the proximal medial aspect of the ulna.  No acute fracture.  No appreciable elbow joint effusion.                               CT Head Without Contrast (Final result)  Result time 07/20/19 08:05:41    Final result by Marco Paula MD (07/20/19 08:05:41)                  Impression:      No acute abnormality.    All CT scans at this facility use dose modulation, iterative reconstruction, and/or weight based dosing when appropriate to reduce radiation dose to as low as reasonably achievable.      Electronically signed by: Marco Paula  Date:    07/20/2019  Time:    08:05             Narrative:    EXAMINATION:  CT HEAD WITHOUT CONTRAST    CLINICAL HISTORY:  Syncope/fainting; Unspecified fall, initial encounter    TECHNIQUE:  Low dose axial CT images obtained throughout the head without intravenous contrast. Sagittal and coronal reconstructions were performed.    COMPARISON:  04/16/2017    FINDINGS:  Intracranial compartment:    Ventricles and sulci are normal in size for age without evidence of hydrocephalus. No extra-axial blood or fluid collections.    Sulcal widening and enlargement of ventricles suggesting age-related white matter volume loss. Bilateral mild periventricular white matter hypoattenuation as can be seen with chronic microangiopathic small-vessel disease. No parenchymal mass, hemorrhage, edema or major vascular distribution infarct.    Skull/extracranial contents (limited evaluation): No fracture. Mastoid air cells and paranasal sinuses are essentially clear.                                Assessment/Plan:      * Acute renal failure due to traumatic rhabdomyolysis  - S/p mechanical fall  - CPK 1000, Cr 2.2, baseline Cr 0.8  - Continue IVF hydration  - replete electrolytes PRN  - monitor I/O  - daily renal panel and cpk    7/21  CK now trending downward  Creatinine 1.0 today   Renal function at baseline   Will continue gentle hydration   Monitor     Creatinine 0.9 today   ADRIAN resolved     COPD (chronic obstructive pulmonary disease)  Current smoker  duonebs prn sob/wheezing    7/21  Pt having continued increase wheezing, current smoker   Continue Duo nebs and IV steriods   Start LABA and ICS  Monitor     7/22  Pt with continued wheezing and rhonchi    Continue SUZIE, LABA, ICS and IV steroids  Monitor      Abrasions of multiple sites  Wound care       Elevated WBC count  - due to stress state s/p fall    WBC elevated due to IV steriods       Elevated troponin  - due to rhabdomyolysis  - no chest pain, sob, or concerns for ACS     CK trending downward       BPH (benign prostatic hyperplasia)  - continue tamsulosin      Essential hypertension  - continue amlodipine 2.5mg daily  - hold lasix due to ADRIAN      Fall at home  - Imaging negative for fractures or acute process  - fall risk        VTE Risk Mitigation (From admission, onward)        Ordered     heparin (porcine) injection 5,000 Units  Every 8 hours      07/20/19 0449     Place PEDRO LUIS hose  Until discontinued      07/20/19 0449     Place sequential compression device  Until discontinued      07/20/19 0449     IP VTE HIGH RISK PATIENT  Once      07/20/19 0449                Malick Adkins NP  Department of Hospital Medicine   [facility]

## 2019-07-22 NOTE — PLAN OF CARE
Problem: Adult Inpatient Plan of Care  Goal: Plan of Care Review  Outcome: Ongoing (interventions implemented as appropriate)  Remains free from injury. States relief of pain with available prn meds. Fluids running as ordered. Bed alarm and telesitter in use for fall prevention. Turning q2hr for pressure ulcer prevention. Vital signs stable. Chart reviewed. Will continue to monitor.

## 2019-07-22 NOTE — CONSULTS
07/22/19 0945        Wound 07/22/19 Intertrigo Groin   Date First Assessed: 07/22/19   Pre-existing: Yes  Primary Wound Type: Intertrigo  Side: (c)   Location: Groin   Wound WDL ex   Dressing Appearance Open to air   Drainage Amount Scant   Drainage Characteristics/Odor Serous   Appearance Red;Moist   Care Cleansed with:;Soap and water;Applied:;Skin Barrier  (critic aid paste)        Wound 07/22/19 Abrasion(s) anterior Knee   Date First Assessed: 07/22/19   Pre-existing: Yes  Primary Wound Type: Abrasion(s)  Side: (c)   Orientation: anterior  Location: Knee   Wound WDL ex   Dressing Appearance Open to air   Drainage Amount None   Appearance Black;Tan;Other (see comments)  (scab)   Tissue loss description Full thickness   Periwound Area Dry;Intact   Wound Length (cm) 8 cm   Wound Width (cm) 6 cm   Wound Depth (cm) 0.1 cm   Wound Volume (cm^3) 4.8 cm^3   Wound Surface Area (cm^2) 48 cm^2   Care Applied:;Skin Barrier        Wound 07/22/19 Abrasion(s) distal Foot   Date First Assessed: 07/22/19   Pre-existing: Yes  Primary Wound Type: Abrasion(s)  Side: (c)   Orientation: distal  Location: (c) Foot   Wound WDL ex   Dressing Appearance Dry   Drainage Amount None   Appearance Maroon;Dry  (sbacs)   Care Applied:;Skin Barrier     Consulted on this 82 y/o M patient due to present on admission skin breakdown. He reportedly fell outside at home and crawled over concrete to get to a phone to call for help. He is awake and alert, sitting up in chair at bedside with female family member present. He is SOB with exertion. Female family member at bedside was attempting to clip patient's toe nails when I entered. I encouraged her to not clip his toenails, and to follow up with podiatry as an outpatient for this for safety. Skin assessment completed. Abrasions noted to all dorsal toes with dry maroon scabbed wound beds. Painted with cavilon. Large full thickness abrasions to bilateral knees noted that are dry, scabbed, and each  measure 8x6x0.1cm. Left lateral calf full thickness abrasion noted with moist yellow subcutaneous tissue covered wound bed, no slough, scant serous drainage. Measures 6x4x0.1cm, Cleansed with saline and foam dressing applied. Multiple other abrasions noted to hands, elbows in various stages of healing with no s/s of infection noted. Left dry and KANDY. Bilateral groin intertrigo noted with moist red discoloration in creases. Cleansed with bath wipes gently and thin layer critic aid paste barrier applied. Sacrum and coccyx intact with no redness or breakdown noted. Recommend all abrasions that are scabbed, cleanse daily with saline and pat dry. Paint with cavilon. Will follow.

## 2019-07-22 NOTE — ASSESSMENT & PLAN NOTE
Start Oral Antibiotics - Azithromycin                                    Continue with FORMOTEROL, DUONEB, PULMICORT.        Continue IV glucocorticoids        Consider nighttime BiPAP 14/8.

## 2019-07-22 NOTE — SUBJECTIVE & OBJECTIVE
Past Medical History:   Diagnosis Date    Arthritis     BPH (benign prostatic hyperplasia)     COPD (chronic obstructive pulmonary disease)     Depression     Hyperlipidemia     Hyperlipidemia     Hypertension     Smoker     2 pack a day       Past Surgical History:   Procedure Laterality Date    APPENDECTOMY      APPENDECTOMY      CHOLECYSTECTOMY      CHOLECYSTECTOMY, LAPAROSCOPIC N/A 6/10/2013    Performed by Louis O. Jeansonne IV, MD at [facility] OR    COLONOSCOPY      EYE SURGERY         Review of patient's allergies indicates:  No Known Allergies     Scheduled Meds:   albuterol-ipratropium  3 mL Nebulization Q4H    amLODIPine  2.5 mg Oral Daily    arformoterol  15 mcg Nebulization Q12H    aspirin  81 mg Oral Daily    atorvastatin  10 mg Oral Daily    budesonide  0.5 mg Nebulization Q12H    buPROPion  300 mg Oral Daily    chlordiazepoxide  10 mg Oral BID    heparin (porcine)  5,000 Units Subcutaneous Q8H    methylPREDNISolone sodium succinate  40 mg Intravenous Q6H    tamsulosin  0.4 mg Oral Daily     Continuous Infusions:   sodium chloride 0.9% 50 mL/hr at 07/22/19 1203     PRN Meds:.dextrose 50%, dextrose 50%, glucagon (human recombinant), glucose, glucose, HYDROcodone-acetaminophen, ondansetron, promethazine (PHENERGAN) IVPB, sodium chloride 0.9%    Family History     Problem Relation (Age of Onset)    Diabetes Mother    Heart disease Father        Tobacco Use    Smoking status: Current Every Day Smoker     Packs/day: 2.00     Years: 60.00     Pack years: 120.00    Smokeless tobacco: Never Used   Substance and Sexual Activity    Alcohol use: Yes     Comment: daily bourbon    Drug use: No    Sexual activity: Not on file         Review of Systems   Constitutional: Positive for activity change and fatigue. Negative for chills, diaphoresis and fever.   HENT: Negative for congestion, ear discharge, rhinorrhea, sinus pressure, sore throat and trouble swallowing.    Eyes: Negative for  discharge and visual disturbance.   Respiratory: Positive for shortness of breath. Negative for apnea, cough, choking, chest tightness, wheezing and stridor.    Cardiovascular: Negative for chest pain, palpitations and leg swelling.   Gastrointestinal: Negative for abdominal distention, abdominal pain, diarrhea, nausea and vomiting.   Endocrine: Negative for cold intolerance and heat intolerance.   Genitourinary: Negative for dysuria, frequency and hematuria.   Musculoskeletal: Negative for arthralgias, back pain, myalgias and neck pain.   Skin: Negative for pallor and rash.   Neurological: Positive for weakness. Negative for dizziness, seizures, syncope and headaches.   Psychiatric/Behavioral: Negative for agitation, confusion and sleep disturbance.     Objective:     Vital Signs (Most Recent):  Temp: 98 °F (36.7 °C) (07/22/19 1604)  Pulse: 66 (07/22/19 1705)  Resp: 18 (07/22/19 1604)  BP: (!) 143/64 (07/22/19 1604)  SpO2: 99 % (07/22/19 1604) Vital Signs (24h Range):  Temp:  [97.7 °F (36.5 °C)-98.3 °F (36.8 °C)] 98 °F (36.7 °C)  Pulse:  [66-88] 66  Resp:  [16-24] 18  SpO2:  [95 %-99 %] 99 %  BP: (119-143)/(58-71) 143/64     Weight: 83.9 kg (185 lb)  Body mass index is 31.76 kg/m².      Intake/Output Summary (Last 24 hours) at 7/22/2019 1748  Last data filed at 7/22/2019 1617  Gross per 24 hour   Intake 1716.66 ml   Output --   Net 1716.66 ml       Physical Exam   Constitutional: He is oriented to person, place, and time. He has a sickly appearance. No distress. Nasal cannula in place.   HENT:   Mouth/Throat: No oropharyngeal exudate.   Eyes: Right eye exhibits no discharge. Left eye exhibits no discharge.   Neck: No JVD present.   Cardiovascular: Exam reveals no gallop and no friction rub.   No murmur heard.  Pulmonary/Chest: No stridor. No respiratory distress. He has no decreased breath sounds. He has wheezes (throughout ). He has rhonchi (throughout ). He has no rales. He exhibits no tenderness.   Abdominal: He  exhibits no distension and no mass. There is no tenderness. There is no rebound and no guarding. No hernia.   Large abdomen      Musculoskeletal: He exhibits no edema or deformity.   Neurological: He is alert and oriented to person, place, and time.   Skin: Skin is warm and dry. Capillary refill takes less than 2 seconds. He is not diaphoretic.   Nursing note and vitals reviewed.      Vents:  Oxygen Concentration (%): 32 (07/22/19 1526)    Lines/Drains/Airways     Peripheral Intravenous Line                 Peripheral IV - Single Lumen 07/20/19 0250 20 G Right Antecubital 2 days         Peripheral IV - Single Lumen 07/20/19 0300 20 G Left Forearm 2 days                Significant Labs:    CBC/Anemia Profile:  Recent Labs   Lab 07/21/19  0841 07/22/19  0532   WBC 16.78* 19.11*  19.11*   HGB 12.0* 10.2*  10.2*   HCT 36.8* 31.9*  31.9*    227  227   MCV 88 87  87   RDW 15.5* 15.2*  15.2*        Chemistries:  Recent Labs   Lab 07/21/19  0841 07/22/19  0532   *  133* 133*  133*   K 3.9  3.9 4.2  4.2     101 101  101   CO2 20*  20* 23  23   BUN 22  22 25*  25*   CREATININE 1.0  1.0 0.9  0.9   CALCIUM 8.8  8.8 8.4*  8.4*   ALBUMIN 3.1*  3.1* 2.8*   PROT 6.7  --    BILITOT 0.5  --    ALKPHOS 98  --    ALT 29  --    AST 50*  --    MG 2.0 2.1   PHOS 2.9 2.8  2.8         Significant Imaging:     CXR: 07/20/19:  Stable mild cardiomegaly.  Prominent left epicardial fat pad is again incidentally noted..  The lungs appear clear of active disease. No acute appearing infiltrate, pleural effusion or pneumothorax identified.    CT CHEST WITHOUT CONTRAST: 07/20/19:  Base of Neck: No significant abnormality.    Thoracic soft tissues: Normal.    Aorta: Left-sided aortic arch.  No aneurysm and no significant atherosclerosis    Heart: Normal size. No effusion.    Pulmonary vasculature: Pulmonary arteries distribute normally.  There are four pulmonary veins.  Amita/Mediastinum: No pathologic rudy  enlargement.    Airways: Patent.    Lungs/Pleura: Clear lungs. No pleural effusion or thickening.  Prominent left pericardial fat pad accounts for chest radiograph appearance.    Esophagus: Normal.    Upper Abdomen: No abnormality of the partially imaged upper abdomen.  Cholecystectomy clips.    Bones: No acute fracture. No suspicious lytic or sclerotic lesions.

## 2019-07-22 NOTE — HOSPITAL COURSE
07/22; Seen and examined at bedside. Hospital chart reviewed. No acute interval detrimental events noted. He reports that he  has slightly improved

## 2019-07-22 NOTE — SUBJECTIVE & OBJECTIVE
Interval History: Pt seen and examined, continue to experience wheezing and rhonchi. Pulmonary consult. Awaiting SNF placement.      Review of Systems   Constitutional: Positive for activity change and fatigue. Negative for chills, diaphoresis and fever.   HENT: Negative for congestion, ear discharge, rhinorrhea, sinus pressure, sore throat and trouble swallowing.    Eyes: Negative for discharge and visual disturbance.   Respiratory: Positive for shortness of breath. Negative for apnea, cough, choking, chest tightness, wheezing and stridor.    Cardiovascular: Negative for chest pain, palpitations and leg swelling.   Gastrointestinal: Negative for abdominal distention, abdominal pain, diarrhea, nausea and vomiting.   Endocrine: Negative for cold intolerance and heat intolerance.   Genitourinary: Negative for dysuria, frequency and hematuria.   Musculoskeletal: Negative for arthralgias, back pain, myalgias and neck pain.   Skin: Negative for pallor and rash.   Neurological: Positive for weakness. Negative for dizziness, seizures, syncope and headaches.   Psychiatric/Behavioral: Negative for agitation, confusion and sleep disturbance.     Objective:     Vital Signs (Most Recent):  Temp: 98 °F (36.7 °C) (07/22/19 1304)  Pulse: 77 (07/22/19 1306)  Resp: 16 (07/22/19 1304)  BP: (!) 129/58 (07/22/19 1304)  SpO2: 98 % (07/22/19 1304) Vital Signs (24h Range):  Temp:  [97.7 °F (36.5 °C)-98.3 °F (36.8 °C)] 98 °F (36.7 °C)  Pulse:  [66-88] 77  Resp:  [16-24] 16  SpO2:  [95 %-98 %] 98 %  BP: (119-138)/(58-71) 129/58     Weight: 83.9 kg (185 lb)  Body mass index is 31.76 kg/m².    Intake/Output Summary (Last 24 hours) at 7/22/2019 1407  Last data filed at 7/22/2019 1207  Gross per 24 hour   Intake 1508.33 ml   Output --   Net 1508.33 ml      Physical Exam   Constitutional: He is oriented to person, place, and time. He has a sickly appearance. No distress. Nasal cannula in place.   HENT:   Mouth/Throat: No oropharyngeal exudate.    Eyes: Right eye exhibits no discharge. Left eye exhibits no discharge.   Neck: No JVD present.   Cardiovascular: Exam reveals no gallop and no friction rub.   No murmur heard.  Pulmonary/Chest: No stridor. No respiratory distress. He has no decreased breath sounds. He has wheezes (throughout ). He has rhonchi (throughout ). He has no rales. He exhibits no tenderness.   Abdominal: He exhibits no distension and no mass. There is no tenderness. There is no rebound and no guarding. No hernia.   Large abdomen      Musculoskeletal: He exhibits no edema or deformity.   Neurological: He is alert and oriented to person, place, and time.   Skin: Skin is warm and dry. Capillary refill takes less than 2 seconds. He is not diaphoretic.   Nursing note and vitals reviewed.      Significant Labs:   CBC:   Recent Labs   Lab 07/21/19  0841 07/22/19  0532   WBC 16.78* 19.11*  19.11*   HGB 12.0* 10.2*  10.2*   HCT 36.8* 31.9*  31.9*    227  227     CMP:   Recent Labs   Lab 07/21/19  0841 07/22/19  0532   *  133* 133*  133*   K 3.9  3.9 4.2  4.2     101 101  101   CO2 20*  20* 23  23   *  246* 205*  205*   BUN 22  22 25*  25*   CREATININE 1.0  1.0 0.9  0.9   CALCIUM 8.8  8.8 8.4*  8.4*   PROT 6.7  --    ALBUMIN 3.1*  3.1* 2.8*   BILITOT 0.5  --    ALKPHOS 98  --    AST 50*  --    ALT 29  --    ANIONGAP 12  12 9  9   EGFRNONAA >60  >60 >60  >60         Significant Imaging:     Imaging Results          CT Chest Without Contrast (Final result)  Result time 07/20/19 09:02:18    Final result by Marco Paula MD (07/20/19 09:02:18)                 Impression:      No acute cardiopulmonary process.      Electronically signed by: Marco Paula  Date:    07/20/2019  Time:    09:02             Narrative:    EXAMINATION:  CT CHEST WITHOUT CONTRAST    CLINICAL HISTORY:  Sepsis;    TECHNIQUE:  Low dose axial images, sagittal and coronal reformations were obtained from the thoracic inlet to the  lung bases. Contrast was not administered.  All CT scans at this location are performed using dose modulation techniques as appropriate to a performed exam including the following: Automated exposure control; adjustment of the mA and/or kV  according to patient size.    COMPARISON:  07/20/2019    FINDINGS:  Base of Neck: No significant abnormality.    Thoracic soft tissues: Normal.    Aorta: Left-sided aortic arch.  No aneurysm and no significant atherosclerosis    Heart: Normal size. No effusion.    Pulmonary vasculature: Pulmonary arteries distribute normally.  There are four pulmonary veins.    Amita/Mediastinum: No pathologic rudy enlargement.    Airways: Patent.    Lungs/Pleura: Clear lungs. No pleural effusion or thickening.  Prominent left pericardial fat pad accounts for chest radiograph appearance.    Esophagus: Normal.    Upper Abdomen: No abnormality of the partially imaged upper abdomen.  Cholecystectomy clips.    Bones: No acute fracture. No suspicious lytic or sclerotic lesions.                               X-Ray Humerus 2 View Bilateral (Final result)  Result time 07/20/19 08:26:04    Final result by Marco Paula MD (07/20/19 08:26:04)                 Impression:      As above.      Electronically signed by: Marco Paula  Date:    07/20/2019  Time:    08:26             Narrative:    EXAMINATION:  XR HUMERUS 2 VIEW BILATERAL    CLINICAL HISTORY:  bilateral elbow pain;    TECHNIQUE:  Bilateral humeral radiographs    COMPARISON:  None    FINDINGS:  Questionable linear lucency of the distal left humerus near the ulnohumeral joint raising question of fracture, only appreciated on the lateral view.  Please correlate with point tenderness.  Recommend dedicated left elbow radiographs.    Right humerus grossly unremarkable.                               X-Ray Hips Bilateral 2 View Incl AP Pelvis (Final result)  Result time 07/20/19 08:23:01    Final result by Marco Paula MD (07/20/19 08:23:01)                  Impression:      No acute abnormality.      Electronically signed by: Marco Paula  Date:    07/20/2019  Time:    08:23             Narrative:    EXAMINATION:  XR HIPS BILATERAL 2 VIEW INCL AP PELVIS    CLINICAL HISTORY:  Pain in right hip    TECHNIQUE:  AP view of the pelvis and frogleg lateral views of both hips were performed.    COMPARISON:  None.    FINDINGS:  No evidence of fracture or dislocation.  Mild hip joint space narrowing.  SI joints unremarkable.  Degenerative changes lumbar spine.  Moderate volume stool overlying the rectum.                               X-Ray Knee 1 or 2 View Bilateral (Final result)  Result time 07/20/19 08:15:18    Final result by Marco Paula MD (07/20/19 08:15:18)                 Impression:      No acute abnormality identified.      Electronically signed by: Marco Paula  Date:    07/20/2019  Time:    08:15             Narrative:    EXAMINATION:  XR KNEE 1 OR 2 VIEW BILATERAL    CLINICAL HISTORY:  Abrasion, right knee, initial encounter    TECHNIQUE:  AP, lateral, and Merchant views of the bilateral knees were performed.    COMPARISON:  None    FINDINGS:  No evidence of fracture or dislocation.  No significant suprapatellar knee effusion.    Soft tissues unremarkable.  No foreign body.    Right knee, moderate medial compartment narrowing with trace calcification of the medial and lateral menisci/cartilage.  Tricompartmental osteophytes.    Left knee, severe bone on bone medial compartment narrowing with articular surface sclerosis and mild varus angulation at the knee joint.  Tricompartmental osteophytes.                               X-Ray Chest AP Portable (Final result)  Result time 07/20/19 08:13:26    Final result by Marco Paula MD (07/20/19 08:13:26)                 Impression:      As above.      Electronically signed by: Marco Paula  Date:    07/20/2019  Time:    08:13             Narrative:    EXAMINATION:  XR CHEST AP PORTABLE    CLINICAL  HISTORY:  wheezing;.    TECHNIQUE:  Single frontal portable view of the chest was performed.    COMPARISON:  None    FINDINGS:  Support devices: None    Suggestion of moderate volume left pleural effusion.  Left heart border is obscured.  Airspace disease not excluded left perihilar region left lung base.  Tiny right pleural effusion.    Bones are intact.                               X-Ray Forearm Bilateral (Final result)  Result time 07/20/19 08:12:08    Final result by Marco Paula MD (07/20/19 08:12:08)                 Impression:      No acute fracture.      Electronically signed by: Marco Paula  Date:    07/20/2019  Time:    08:12             Narrative:    EXAMINATION:  XR FOREARM BILATERAL    CLINICAL HISTORY:  bilateral elbow pain;    TECHNIQUE:  Radiographs of the left and right elbows    COMPARISON:  None    FINDINGS:  Mild degenerative change bilateral radiohumeral joints.  At the left elbow, chronic appearing posttraumatic change along the proximal medial aspect of the ulna.  No acute fracture.  No appreciable elbow joint effusion.                               CT Head Without Contrast (Final result)  Result time 07/20/19 08:05:41    Final result by Marco Paula MD (07/20/19 08:05:41)                 Impression:      No acute abnormality.    All CT scans at this facility use dose modulation, iterative reconstruction, and/or weight based dosing when appropriate to reduce radiation dose to as low as reasonably achievable.      Electronically signed by: Marco Paula  Date:    07/20/2019  Time:    08:05             Narrative:    EXAMINATION:  CT HEAD WITHOUT CONTRAST    CLINICAL HISTORY:  Syncope/fainting; Unspecified fall, initial encounter    TECHNIQUE:  Low dose axial CT images obtained throughout the head without intravenous contrast. Sagittal and coronal reconstructions were performed.    COMPARISON:  04/16/2017    FINDINGS:  Intracranial compartment:    Ventricles and sulci are normal in size for  age without evidence of hydrocephalus. No extra-axial blood or fluid collections.    Sulcal widening and enlargement of ventricles suggesting age-related white matter volume loss. Bilateral mild periventricular white matter hypoattenuation as can be seen with chronic microangiopathic small-vessel disease. No parenchymal mass, hemorrhage, edema or major vascular distribution infarct.    Skull/extracranial contents (limited evaluation): No fracture. Mastoid air cells and paranasal sinuses are essentially clear.

## 2019-07-23 VITALS
TEMPERATURE: 98 F | HEIGHT: 64 IN | DIASTOLIC BLOOD PRESSURE: 58 MMHG | BODY MASS INDEX: 31.58 KG/M2 | RESPIRATION RATE: 20 BRPM | OXYGEN SATURATION: 95 % | HEART RATE: 72 BPM | WEIGHT: 185 LBS | SYSTOLIC BLOOD PRESSURE: 122 MMHG

## 2019-07-23 PROBLEM — T07.XXXA ABRASIONS OF MULTIPLE SITES: Status: RESOLVED | Noted: 2019-07-22 | Resolved: 2019-07-23

## 2019-07-23 PROBLEM — R79.89 ELEVATED TROPONIN: Status: RESOLVED | Noted: 2019-07-20 | Resolved: 2019-07-23

## 2019-07-23 PROBLEM — N17.9 ACUTE RENAL FAILURE DUE TO TRAUMATIC RHABDOMYOLYSIS: Status: RESOLVED | Noted: 2019-07-20 | Resolved: 2019-07-23

## 2019-07-23 PROBLEM — T79.6XXA ACUTE RENAL FAILURE DUE TO TRAUMATIC RHABDOMYOLYSIS: Status: RESOLVED | Noted: 2019-07-20 | Resolved: 2019-07-23

## 2019-07-23 PROBLEM — D72.829 ELEVATED WBC COUNT: Status: RESOLVED | Noted: 2019-07-20 | Resolved: 2019-07-23

## 2019-07-23 LAB
ALBUMIN SERPL BCP-MCNC: 2.7 G/DL (ref 3.5–5.2)
ANION GAP SERPL CALC-SCNC: 7 MMOL/L (ref 8–16)
ANION GAP SERPL CALC-SCNC: 7 MMOL/L (ref 8–16)
BASOPHILS # BLD AUTO: 0 K/UL (ref 0–0.2)
BASOPHILS # BLD AUTO: 0 K/UL (ref 0–0.2)
BASOPHILS NFR BLD: 0 % (ref 0–1.9)
BASOPHILS NFR BLD: 0 % (ref 0–1.9)
BUN SERPL-MCNC: 27 MG/DL (ref 8–23)
BUN SERPL-MCNC: 27 MG/DL (ref 8–23)
CALCIUM SERPL-MCNC: 8.1 MG/DL (ref 8.7–10.5)
CALCIUM SERPL-MCNC: 8.1 MG/DL (ref 8.7–10.5)
CHLORIDE SERPL-SCNC: 103 MMOL/L (ref 95–110)
CHLORIDE SERPL-SCNC: 103 MMOL/L (ref 95–110)
CK SERPL-CCNC: 861 U/L (ref 20–200)
CK SERPL-CCNC: 861 U/L (ref 20–200)
CO2 SERPL-SCNC: 24 MMOL/L (ref 23–29)
CO2 SERPL-SCNC: 24 MMOL/L (ref 23–29)
CREAT SERPL-MCNC: 0.9 MG/DL (ref 0.5–1.4)
CREAT SERPL-MCNC: 0.9 MG/DL (ref 0.5–1.4)
DIFFERENTIAL METHOD: ABNORMAL
DIFFERENTIAL METHOD: ABNORMAL
EOSINOPHIL # BLD AUTO: 0 K/UL (ref 0–0.5)
EOSINOPHIL # BLD AUTO: 0 K/UL (ref 0–0.5)
EOSINOPHIL NFR BLD: 0 % (ref 0–8)
EOSINOPHIL NFR BLD: 0 % (ref 0–8)
ERYTHROCYTE [DISTWIDTH] IN BLOOD BY AUTOMATED COUNT: 15.5 % (ref 11.5–14.5)
ERYTHROCYTE [DISTWIDTH] IN BLOOD BY AUTOMATED COUNT: 15.5 % (ref 11.5–14.5)
EST. GFR  (AFRICAN AMERICAN): >60 ML/MIN/1.73 M^2
EST. GFR  (AFRICAN AMERICAN): >60 ML/MIN/1.73 M^2
EST. GFR  (NON AFRICAN AMERICAN): >60 ML/MIN/1.73 M^2
EST. GFR  (NON AFRICAN AMERICAN): >60 ML/MIN/1.73 M^2
GLUCOSE SERPL-MCNC: 243 MG/DL (ref 70–110)
GLUCOSE SERPL-MCNC: 243 MG/DL (ref 70–110)
HCT VFR BLD AUTO: 30.8 % (ref 40–54)
HCT VFR BLD AUTO: 30.8 % (ref 40–54)
HGB BLD-MCNC: 9.9 G/DL (ref 14–18)
HGB BLD-MCNC: 9.9 G/DL (ref 14–18)
LYMPHOCYTES # BLD AUTO: 0.7 K/UL (ref 1–4.8)
LYMPHOCYTES # BLD AUTO: 0.7 K/UL (ref 1–4.8)
LYMPHOCYTES NFR BLD: 4.6 % (ref 18–48)
LYMPHOCYTES NFR BLD: 4.6 % (ref 18–48)
MAGNESIUM SERPL-MCNC: 2.3 MG/DL (ref 1.6–2.6)
MCH RBC QN AUTO: 28.2 PG (ref 27–31)
MCH RBC QN AUTO: 28.2 PG (ref 27–31)
MCHC RBC AUTO-ENTMCNC: 32.1 G/DL (ref 32–36)
MCHC RBC AUTO-ENTMCNC: 32.1 G/DL (ref 32–36)
MCV RBC AUTO: 88 FL (ref 82–98)
MCV RBC AUTO: 88 FL (ref 82–98)
MONOCYTES # BLD AUTO: 0.6 K/UL (ref 0.3–1)
MONOCYTES # BLD AUTO: 0.6 K/UL (ref 0.3–1)
MONOCYTES NFR BLD: 3.8 % (ref 4–15)
MONOCYTES NFR BLD: 3.8 % (ref 4–15)
NEUTROPHILS # BLD AUTO: 13.2 K/UL (ref 1.8–7.7)
NEUTROPHILS # BLD AUTO: 13.2 K/UL (ref 1.8–7.7)
NEUTROPHILS NFR BLD: 92.6 % (ref 38–73)
NEUTROPHILS NFR BLD: 92.6 % (ref 38–73)
PHOSPHATE SERPL-MCNC: 2.6 MG/DL (ref 2.7–4.5)
PHOSPHATE SERPL-MCNC: 2.6 MG/DL (ref 2.7–4.5)
PLATELET # BLD AUTO: 243 K/UL (ref 150–350)
PLATELET # BLD AUTO: 243 K/UL (ref 150–350)
PMV BLD AUTO: 9.6 FL (ref 9.2–12.9)
PMV BLD AUTO: 9.6 FL (ref 9.2–12.9)
POTASSIUM SERPL-SCNC: 4.6 MMOL/L (ref 3.5–5.1)
POTASSIUM SERPL-SCNC: 4.6 MMOL/L (ref 3.5–5.1)
RBC # BLD AUTO: 3.51 M/UL (ref 4.6–6.2)
RBC # BLD AUTO: 3.51 M/UL (ref 4.6–6.2)
SODIUM SERPL-SCNC: 134 MMOL/L (ref 136–145)
SODIUM SERPL-SCNC: 134 MMOL/L (ref 136–145)
WBC # BLD AUTO: 14.44 K/UL (ref 3.9–12.7)
WBC # BLD AUTO: 14.44 K/UL (ref 3.9–12.7)

## 2019-07-23 PROCEDURE — 25000003 PHARM REV CODE 250: Performed by: NURSE PRACTITIONER

## 2019-07-23 PROCEDURE — 99900035 HC TECH TIME PER 15 MIN (STAT)

## 2019-07-23 PROCEDURE — 25000242 PHARM REV CODE 250 ALT 637 W/ HCPCS: Performed by: NURSE PRACTITIONER

## 2019-07-23 PROCEDURE — 94640 AIRWAY INHALATION TREATMENT: CPT

## 2019-07-23 PROCEDURE — 96372 THER/PROPH/DIAG INJ SC/IM: CPT

## 2019-07-23 PROCEDURE — 97116 GAIT TRAINING THERAPY: CPT | Mod: 59

## 2019-07-23 PROCEDURE — 63600175 PHARM REV CODE 636 W HCPCS: Performed by: NURSE PRACTITIONER

## 2019-07-23 PROCEDURE — 83735 ASSAY OF MAGNESIUM: CPT

## 2019-07-23 PROCEDURE — 85025 COMPLETE CBC W/AUTO DIFF WBC: CPT

## 2019-07-23 PROCEDURE — 82550 ASSAY OF CK (CPK): CPT

## 2019-07-23 PROCEDURE — G0378 HOSPITAL OBSERVATION PER HR: HCPCS

## 2019-07-23 PROCEDURE — 25000003 PHARM REV CODE 250: Performed by: HOSPITALIST

## 2019-07-23 PROCEDURE — 97530 THERAPEUTIC ACTIVITIES: CPT

## 2019-07-23 PROCEDURE — 96376 TX/PRO/DX INJ SAME DRUG ADON: CPT

## 2019-07-23 PROCEDURE — 97110 THERAPEUTIC EXERCISES: CPT

## 2019-07-23 PROCEDURE — 63600175 PHARM REV CODE 636 W HCPCS: Performed by: HOSPITALIST

## 2019-07-23 PROCEDURE — 80069 RENAL FUNCTION PANEL: CPT

## 2019-07-23 PROCEDURE — 27000221 HC OXYGEN, UP TO 24 HOURS

## 2019-07-23 PROCEDURE — 96361 HYDRATE IV INFUSION ADD-ON: CPT

## 2019-07-23 PROCEDURE — 36415 COLL VENOUS BLD VENIPUNCTURE: CPT

## 2019-07-23 RX ORDER — AZITHROMYCIN 250 MG/1
500 TABLET, FILM COATED ORAL ONCE
Status: COMPLETED | OUTPATIENT
Start: 2019-07-23 | End: 2019-07-23

## 2019-07-23 RX ORDER — AZITHROMYCIN 250 MG/1
250 TABLET, FILM COATED ORAL DAILY
Qty: 4 TABLET | Refills: 0
Start: 2019-07-23 | End: 2019-07-27

## 2019-07-23 RX ORDER — PREDNISONE 20 MG/1
40 TABLET ORAL DAILY
Qty: 10 TABLET | Refills: 0
Start: 2019-07-23 | End: 2019-07-28

## 2019-07-23 RX ADMIN — IPRATROPIUM BROMIDE AND ALBUTEROL SULFATE 3 ML: .5; 3 SOLUTION RESPIRATORY (INHALATION) at 12:07

## 2019-07-23 RX ADMIN — METHYLPREDNISOLONE SODIUM SUCCINATE 40 MG: 40 INJECTION, POWDER, FOR SOLUTION INTRAMUSCULAR; INTRAVENOUS at 11:07

## 2019-07-23 RX ADMIN — BUPROPION HYDROCHLORIDE 300 MG: 150 TABLET, EXTENDED RELEASE ORAL at 08:07

## 2019-07-23 RX ADMIN — TAMSULOSIN HYDROCHLORIDE 0.4 MG: 0.4 CAPSULE ORAL at 08:07

## 2019-07-23 RX ADMIN — ASPIRIN 81 MG: 81 TABLET, COATED ORAL at 08:07

## 2019-07-23 RX ADMIN — HEPARIN SODIUM 5000 UNITS: 5000 INJECTION, SOLUTION INTRAVENOUS; SUBCUTANEOUS at 02:07

## 2019-07-23 RX ADMIN — SODIUM CHLORIDE: 0.9 INJECTION, SOLUTION INTRAVENOUS at 08:07

## 2019-07-23 RX ADMIN — METHYLPREDNISOLONE SODIUM SUCCINATE 40 MG: 40 INJECTION, POWDER, FOR SOLUTION INTRAMUSCULAR; INTRAVENOUS at 05:07

## 2019-07-23 RX ADMIN — ARFORMOTEROL TARTRATE 15 MCG: 15 SOLUTION RESPIRATORY (INHALATION) at 07:07

## 2019-07-23 RX ADMIN — BUDESONIDE 0.5 MG: 0.5 SUSPENSION RESPIRATORY (INHALATION) at 07:07

## 2019-07-23 RX ADMIN — IPRATROPIUM BROMIDE AND ALBUTEROL SULFATE 3 ML: .5; 3 SOLUTION RESPIRATORY (INHALATION) at 03:07

## 2019-07-23 RX ADMIN — AZITHROMYCIN 500 MG: 250 TABLET, FILM COATED ORAL at 02:07

## 2019-07-23 RX ADMIN — CHLORDIAZEPOXIDE HYDROCHLORIDE 10 MG: 10 CAPSULE ORAL at 08:07

## 2019-07-23 RX ADMIN — ATORVASTATIN CALCIUM 10 MG: 10 TABLET, FILM COATED ORAL at 08:07

## 2019-07-23 RX ADMIN — IPRATROPIUM BROMIDE AND ALBUTEROL SULFATE 3 ML: .5; 3 SOLUTION RESPIRATORY (INHALATION) at 07:07

## 2019-07-23 RX ADMIN — HYDROCODONE BITARTRATE AND ACETAMINOPHEN 1 TABLET: 5; 325 TABLET ORAL at 03:07

## 2019-07-23 RX ADMIN — AMLODIPINE BESYLATE 2.5 MG: 2.5 TABLET ORAL at 08:07

## 2019-07-23 RX ADMIN — HEPARIN SODIUM 5000 UNITS: 5000 INJECTION, SOLUTION INTRAVENOUS; SUBCUTANEOUS at 05:07

## 2019-07-23 NOTE — PT/OT/SLP PROGRESS
Physical Therapy  Treatment    Troy Chau   MRN: [id number]   Admitting Diagnosis: Acute renal failure due to traumatic rhabdomyolysis    PT Received On: 07/23/19  PT Start Time: 1036     PT Stop Time: 1100    PT Total Time (min): 24 min       Billable Minutes:  Gait Training 14 and Therapeutic Exercise 10    Treatment Type: Treatment  PT/PTA: PT             General Precautions: Standard, fall, respiratory  Orthopedic Precautions: N/A   Braces: N/A         Subjective:  Communicated with NURSE ANDRE AND Epic CHART REVIEW prior to session.  PT AGREED TO TX     Pain/Comfort  Pain Rating 1: 5/10  Location - Side 1: Right  Location 1: shoulder  Pain Rating Post-Intervention 1: 5/10    Objective:   Patient found with: peripheral IV, oxygen    Functional Mobility:  PT MET IN RM SEATED IN CHAIR WITH NIECE PRESENT. PT SEATED IN CHAIR FOR B LE TE X 20 REPS OF AP, TKE, AND MIP. PT STOOD X 2 WITH RW AND MIN A WITH O2 IN TOW AND GT TRAINED X 90' WITH STEP TO GT AND MIN A. PT RETURNED TO  T/F TO CHAIR AND LEFT SEATED WITH NIECE PRESENT AND ALL NEEDS MET.     AM-PAC 6 CLICK MOBILITY  How much help from another person does this patient currently need?   1 = Unable, Total/Dependent Assistance  2 = A lot, Maximum/Moderate Assistance  3 = A little, Minimum/Contact Guard/Supervision  4 = None, Modified [place]/Independent    Turning over in bed (including adjusting bedclothes, sheets and blankets)?: 1  Sitting down on and standing up from a chair with arms (e.g., wheelchair, bedside commode, etc.): 3  Moving from lying on back to sitting on the side of the bed?: 1  Moving to and from a bed to a chair (including a wheelchair)?: 3  Need to walk in hospital room?: 3  Climbing 3-5 steps with a railing?: 1  Basic Mobility Total Score: 12    AM-PAC Raw Score CMS G-Code Modifier Level of Impairment Assistance   6 % Total / Unable   7 - 9 CM 80 - 100% Maximal Assist   10 - 14 CL 60 - 80% Moderate Assist   15 - 19 CK 40 - 60%  Moderate Assist   20 - 22 CJ 20 - 40% Minimal Assist   23 CI 1-20% SBA / CGA   24 CH 0% Independent/ Mod I     Patient left up in chair with all lines intact, call button in reach and chair alarm on.    Assessment:  PT PROGRESSING WITH GT     Rehab identified problem list/impairments: Rehab identified problem list/impairments: weakness, impaired endurance, impaired functional mobilty, gait instability, impaired self care skills, impaired balance, decreased lower extremity function, decreased upper extremity function, pain, decreased ROM, decreased safety awareness    Rehab potential is good.    Activity tolerance: Fair    Discharge recommendations: Discharge Facility/Level of Care Needs: nursing facility, skilled     Barriers to discharge:      Equipment recommendations: Equipment Needed After Discharge: walker, rolling     GOALS:   Multidisciplinary Problems     Physical Therapy Goals        Problem: Physical Therapy Goal    Goal Priority Disciplines Outcome Goal Variances Interventions   Physical Therapy Goal     PT, PT/OT Ongoing (interventions implemented as appropriate)     Description:  1. Patient will perform supine to/from sit mod A  2. Patient will perform sit to/from stand with RW mod A  3. Patient will amb 50ft RW mod A                     PLAN:    Patient to be seen 5 x/week  to address the above listed problems via gait training, therapeutic activities, therapeutic exercises  Plan of Care expires: 07/27/19  Plan of Care reviewed with: patient         Rosanne Hurd, PT  07/23/2019

## 2019-07-23 NOTE — PLAN OF CARE
Problem: Physical Therapy Goal  Goal: Physical Therapy Goal  1. Patient will perform supine to/from sit mod A  2. Patient will perform sit to/from stand with RW mod A  3. Patient will amb 50ft RW mod A   Outcome: Ongoing (interventions implemented as appropriate)  PT GT TRAINED X 90' WITH RW AND MIN A

## 2019-07-23 NOTE — PLAN OF CARE
Problem: Adult Inpatient Plan of Care  Goal: Plan of Care Review  Outcome: Ongoing (interventions implemented as appropriate)  No acute events overnight. Pt confused at time, reoriented as needed. [employer] for safety. Turning q 2 with foam wedge when allowed by pt. Wound care provided. IVF infusing. Expiratory wheezing noted. IV steroid administered as ordered. O2 via 3L NC. Pain adequately controlled. NSR on the monitor. Chart reviewed. Will monitor.

## 2019-07-23 NOTE — PT/OT/SLP PROGRESS
Occupational Therapy  Treatment    Troy Chau   MRN: [id number]   Admitting Diagnosis: Acute renal failure due to traumatic rhabdomyolysis    OT Date of Treatment: 07/23/19   OT Start Time: 0950  OT Stop Time: 1020  OT Total Time (min): 30 min    Billable Minutes:  Therapeutic Activity 15 MINUTES and Therapeutic Exercise 15 MINUTES    General Precautions: Standard, fall, respiratory, hearing impaired  Orthopedic Precautions: N/A  Braces: N/A         Subjective:  Communicated with NURSE AND Epic CHART REVIEW prior to session.    Pain/Comfort  Pain Rating 1: 10/10  Location - Side 1: Right  Location - Orientation 1: generalized  Location 1: shoulder  Pain Addressed 1: Reposition, Distraction, Cessation of Activity    Objective:  Patient found with: peripheral IV, oxygen     Functional Mobility:    Transfers:   MIN A WITH VC'S FOR HAND PLACEMENT AND SAFETY  WITH RW  Functional Ambulation: PT AMBULATED 90 FEET  WITH RW VC'S FOR BREATHING TECHNIQUES    Activities of Daily Living:     Feeding adaptive equipment: NA     UE adaptive equipment: NA     LE adaptive equipment:  NA    Bathing adaptive equipment: NA    Balance:   Static Sit: FAIR+: Able to take MINIMAL challenges from all directions  SUPPORTED CHAIR  Dynamic Sit: FAIR: Cannot move trunk without losing balance SUPPORTED CHAIR  Static Stand: POOR+: Needs MINIMAL assist to maintain  Dynamic stand: POOR+: Needs MIN (minimal ) assist during gait    Therapeutic Activities and Exercises:  PT PERFORMED GENTLE AA/AROM R SHOULDER 1 SET X 10 REPS B UE ROM EXERCISE SEATED IN BED SIDE CHAIR    AM-PAC 6 CLICK ADL   How much help from another person does this patient currently need?   1 = Unable, Total/Dependent Assistance  2 = A lot, Maximum/Moderate Assistance  3 = A little, Minimum/Contact Guard/Supervision  4 = None, Modified [place]/Independent    Putting on and taking off regular lower body clothing? : 2  Bathing (including washing, rinsing, drying)?: 2  Toileting,  "which includes using toilet, bedpan, or urinal? : 2  Putting on and taking off regular upper body clothing?: 2  Taking care of personal grooming such as brushing teeth?: 3  Eating meals?: 2  Daily Activity Total Score: 13     AM-PAC Raw Score CMS "G-Code Modifier Level of Impairment Assistance   6 % Total / Unable   7 - 8 CM 80 - 100% Maximal Assist   9-13 CL 60 - 80% Moderate Assist   14 - 19 CK 40 - 60% Moderate Assist   20 - 22 CJ 20 - 40% Minimal Assist   23 CI 1-20% SBA / CGA   24 CH 0% Independent/ Mod I       Patient left up in chair with all lines intact, call button in reach, chair alarm on, NURSE notified and DAUGHTER present    ASSESSMENT:  Troy Chau is a 83 y.o. male with a medical diagnosis of Acute renal failure due to traumatic rhabdomyolysis and presents with DEBILITY AND GENERALIZED WEAKNESS. PT WILL CONTINUE TO BENEFIT FROM SKILLED O.T...    Rehab identified problem list/impairments: Rehab identified problem list/impairments: weakness, impaired self care skills, impaired endurance, impaired functional mobilty, gait instability, impaired balance, decreased upper extremity function, decreased lower extremity function    Rehab potential is good.    Activity tolerance: Good    Discharge recommendations: Discharge Facility/Level of Care Needs: nursing facility, skilled     Barriers to discharge: Barriers to Discharge: Decreased caregiver support    Equipment recommendations:       GOALS:   Multidisciplinary Problems     Occupational Therapy Goals        Problem: Occupational Therapy Goal    Goal Priority Disciplines Outcome Interventions   Occupational Therapy Goal     OT, PT/OT Ongoing (interventions implemented as appropriate)    Description:  LTGs to be met by 7/27/19  1. Pt will perform BSC t/fs with Mod A  2. Pt will perform LE dressing with Mod A  3. Pt will perform UE dressing with Mod A  4. Pt will perform (B( UE ROM exercises 1 x 15 reps                     Plan:  Patient to be seen " 3 x/week to address the above listed problems via self-care/home management, therapeutic exercises, therapeutic activities  Plan of Care expires:    Plan of Care reviewed with: patient         April Garcia, OT  07/23/2019

## 2019-07-23 NOTE — CHAPLAIN
Initial visit with patient.  Provided ministries of listening and presence.  Pt requested communion and I will work to arrange for him to receive this.  Pt had no other spiritual needs and I will follow up as needed.    Chaplain Brendan Portillo M.Div., [facility]

## 2019-07-23 NOTE — PROGRESS NOTES
Patient remains on NC at this time. Patient tolerated treatment well. Aformoterol scanned twice, only one dose given.

## 2019-07-23 NOTE — PROGRESS NOTES
Rudi Pate submitted for auth on 7/22.  Awaiting insurance approval.      12:35pm:  D/C orders faxed to facility.  Awaiting  time and number to call report.

## 2019-07-24 NOTE — DISCHARGE SUMMARY
[facility] Hospital Medicine  Discharge Summary      Patient Name: Troy Chau  MRN: [id number]  Admission Date: 7/19/2019  Hospital Length of Stay: 0 days  Discharge Date and Time: 7/23/2019  4:53 PM  Attending Physician: Troy Vogel MD  Discharging Provider: Malick Adkins NP  Primary Care Provider: Tanvir Petersen MD      HPI:   83M h/o HTN, HLD, COPD, and BPH presents s/p mechanical fall.  Incurred multiple abrasions involving both arms, both knees, hip and back, as well as head trauma.  Denies LOC, dizziness, seizure-like activity, muscle weakness, numbness, HA, SOB, CP, ab pain, constipation/diarrhea, recent illness.  In ER, vitals stable.  Imaging of head, both arms, both knees were negative for acute fx or process.  CXR show increase in left lower lobe opacity.   Wbc 26, BUN 29, Cr 2.2, CPK 1000.   ER physician (Justina) gave patient 30ml/kg fluid bolus and zosyn IV bolus with concerned for sepsis.  Because CXR findings more consistent with pleural effusion than pnuemonia, CT chest requested by Hospital Medicine to not only locate a source of infection but also evaluate for pulmonary hemorrhage, hemothorax  s/p fall and trauma.  CT chest statrad reading was negative for any acute findings suggesting hemothorax, pulmonary hemorrhage, significant pleural effusion or pneumonia.   Patient placed in observation for ADRIAN due to traumatic rhabdomyolysis s/p mechanical fall.     * No surgery found *      Hospital Course:   Mr Chau is a 83 year old male who was placed in Observation secondary to ADRIAN associated with traumatic rhabdomyolysis after falling at home.  Cr elevated to 2.2, baseline is around 0.8.  Also noted to have an elevated WBC count of 18k which was thought to be r/t stress reaction as CT chest and UA were both negative for acute pulmonary process, he was also afebrile.  Patient also noted to have a mildly elevated troponin of 0.54.  12 lead EKG showed NSR with no significant  change.  Patient was given empiric ABX in the ER and started on IVFs.  PT/OT consulted given recent fall and recommended SNF placement.  CM consulted to assist with DC planning.  As of 7/21, patient was initially difficult to arouse after receiving Ativan. O 2 sats and ABG were stable. He later wore up and became somewhat combative, however later was calm and stable with family member at bedside. ADRIAN has resolved with IVF. Pt continued to experiencing wheezing after being on SUZIE, LABA, ICS and IV steroids. As of 7/22, patient more awake and alerts, sitting up in chair. Continue to experiencing wheezing and rhonchi, O 2 at 2-3 L.  Pulmonary consulted for further evaluation. Vital signs stable. Renal function stable, CK trending downward. Awaiting SNF placement. As of today, patient feeling well, wheezing has resolved. Pt having some Rt shoulder pain, Rt shoulder X ray stable. Will follow up with Orthopedic Surgery as outpatient. Patient seen, examined and deemed suitable for discharge to [facility].      Consults:   Consults (From admission, onward)        Status Ordering Provider     Inpatient consult to Pulmonology  Once     Provider:  (Not yet assigned)    Completed PEPPER DE LEON     Inpatient consult to Social Work  Once     Provider:  (Not yet assigned)    Completed ESTELLA MONTANA     Inpatient consult to Social Work  Once     Provider:  (Not yet assigned)    Completed PEPPER D ELEON          No new Assessment & Plan notes have been filed under this hospital service since the last note was generated.  Service: Hospital Medicine    Final Active Diagnoses:    Diagnosis Date Noted POA    COPD (chronic obstructive pulmonary disease) [J44.9] 07/20/2019 Yes    Fall at home [W19.XXXA, Y92.009] 07/20/2019 Not Applicable    Essential hypertension [I10] 07/20/2019 Yes    BPH (benign prostatic hyperplasia) [N40.0] 07/20/2019 Yes    Smoker [F17.200] 06/10/2013 Yes      Problems Resolved During this  Admission:    Diagnosis Date Noted Date Resolved POA    PRINCIPAL PROBLEM:  Acute renal failure due to traumatic rhabdomyolysis [N17.9, T79.6XXA] 07/20/2019 07/23/2019 Yes    Abrasions of multiple sites [T07.XXXA] 07/22/2019 07/23/2019 Yes    Elevated troponin [R74.8] 07/20/2019 07/23/2019 Yes    Elevated WBC count [D72.829] 07/20/2019 07/23/2019 Yes       Discharged Condition: stable    Disposition: Skilled Nursing Facility    Follow Up:  Follow-up Information     Tanvir Petersen MD. Schedule an appointment as soon as possible for a visit in 1 week.    Specialty:  Internal Medicine  Why:  hospital follow up   Contact information:  [address] [facility] [address] [phone]             [facility]. Schedule an appointment as soon as possible for a visit in 1 day.    Specialty:  Orthopedics  Why:  Right shoulder pain, possible rotator cuff injury   Contact information:  [address] [facility] [address] [phone]  Additional information:  (off [facility]) 1st floor               Patient Instructions:      Other restrictions (specify):   Scheduling Instructions: O 2 at 2L  As needed       Significant Diagnostic Studies: Labs:   CMP   Recent Labs   Lab 07/22/19  0532 07/23/19  0548   *  133* 134*  134*   K 4.2  4.2 4.6  4.6     101 103  103   CO2 23  23 24  24   *  205* 243*  243*   BUN 25*  25* 27*  27*   CREATININE 0.9  0.9 0.9  0.9   CALCIUM 8.4*  8.4* 8.1*  8.1*   ALBUMIN 2.8* 2.7*   ANIONGAP 9  9 7*  7*   ESTGFRAFRICA >60  >60 >60  >60   EGFRNONAA >60  >60 >60  >60    and CBC   Recent Labs   Lab 07/22/19  0532 07/23/19  0548   WBC 19.11*  19.11* 14.44*  14.44*   HGB 10.2*  10.2* 9.9*  9.9*   HCT 31.9*  31.9* 30.8*  30.8*     227 243  243       Pending Diagnostic Studies:     None         Medications:  Reconciled Home Medications:      Medication List      START taking these medications    azithromycin 250  MG tablet  Commonly known as:  Z-TIGIST  Take 1 tablet (250 mg total) by mouth once daily. for 4 days     predniSONE 20 MG tablet  Commonly known as:  DELTASONE  Take 2 tablets (40 mg total) by mouth once daily. for 5 days        CONTINUE taking these medications    albuterol 2.5 mg /3 mL (0.083 %) nebulizer solution  Commonly known as:  PROVENTIL  Take 2.5 mg by nebulization every 6 (six) hours as needed for Wheezing. Rescue     AMLODIPINE ORAL  Take 2.5 mg by mouth once daily.     aspirin 81 MG EC tablet  Commonly known as:  ECOTRIN  Take 81 mg by mouth once daily.     atorvastatin 10 MG tablet  Commonly known as:  LIPITOR  Take 10 mg by mouth once daily.     buPROPion 300 MG 24 hr tablet  Commonly known as:  WELLBUTRIN XL  Take 300 mg by mouth once daily.     CLARITIN ORAL  Take by mouth.     FLOMAX 0.4 mg Cap  Generic drug:  tamsulosin  Take 0.4 mg by mouth once daily.     furosemide 40 MG tablet  Commonly known as:  LASIX  Take 40 mg by mouth once daily.     meclizine 25 mg tablet  Commonly known as:  ANTIVERT  Take 1 tablet (25 mg total) by mouth 3 (three) times daily as needed for Dizziness.     potassium chloride 10 MEQ Tbsr  Commonly known as:  KLOR-CON  Take 10 mEq by mouth once.            Indwelling Lines/Drains at time of discharge:   Lines/Drains/Airways          None          Time spent on the discharge of patient: 40 minutes  Patient was seen and examined on the date of discharge and determined to be suitable for discharge.         Malick Adkins NP  Department of Hospital Medicine  [facility]

## 2019-07-25 LAB
BACTERIA BLD CULT: NORMAL
BACTERIA BLD CULT: NORMAL

## 2019-08-28 ENCOUNTER — OFFICE VISIT (OUTPATIENT)
Dept: OTOLARYNGOLOGY | Facility: CLINIC | Age: 84
End: 2019-08-28
Payer: MEDICARE

## 2019-08-28 VITALS — SYSTOLIC BLOOD PRESSURE: 134 MMHG | HEART RATE: 86 BPM | DIASTOLIC BLOOD PRESSURE: 76 MMHG | TEMPERATURE: 98 F

## 2019-08-28 DIAGNOSIS — H91.90 PERCEIVED HEARING CHANGES: Primary | ICD-10-CM

## 2019-08-28 DIAGNOSIS — R42 DIZZINESS: ICD-10-CM

## 2019-08-28 PROCEDURE — 99203 PR OFFICE/OUTPT VISIT, NEW, LEVL III, 30-44 MIN: ICD-10-PCS | Mod: 25,S$GLB,, | Performed by: PHYSICIAN ASSISTANT

## 2019-08-28 PROCEDURE — 99999 PR PBB SHADOW E&M-EST. PATIENT-LVL III: ICD-10-PCS | Mod: PBBFAC,,, | Performed by: PHYSICIAN ASSISTANT

## 2019-08-28 PROCEDURE — 99999 PR PBB SHADOW E&M-EST. PATIENT-LVL III: CPT | Mod: PBBFAC,,, | Performed by: PHYSICIAN ASSISTANT

## 2019-08-28 PROCEDURE — 69210 PR REMOVAL IMPACTED CERUMEN REQUIRING INSTRUMENTATION, UNILATERAL: ICD-10-PCS | Mod: S$GLB,,, | Performed by: PHYSICIAN ASSISTANT

## 2019-08-28 PROCEDURE — 99203 OFFICE O/P NEW LOW 30 MIN: CPT | Mod: 25,S$GLB,, | Performed by: PHYSICIAN ASSISTANT

## 2019-08-28 PROCEDURE — 69210 REMOVE IMPACTED EAR WAX UNI: CPT | Mod: S$GLB,,, | Performed by: PHYSICIAN ASSISTANT

## 2019-08-28 NOTE — PROGRESS NOTES
Subjective:       Patient ID: Troy Chau is a 83 y.o. male.    Chief Complaint: Hearing Loss and Dizziness (Hx of vertigo )    Mr. Chau is a very pleasant 82 yo male here to see me today with complaints of dizziness when arising in the morning or standing up, and worsening loss of hearing. He is in a   wheelchair and not able to move easily. Niece at bedside is concerned about hearing and [facility]. He had a fall 6 weeks ago- she is not totally sure what happened. Patient lives alone.     Review of Systems   Constitutional: Negative for fatigue, fever and unexpected weight change.   HENT: Positive for hearing loss. Negative for congestion, ear discharge, ear pain, facial swelling, nosebleeds, postnasal drip, rhinorrhea, sinus pressure, sneezing, sore throat, tinnitus, trouble swallowing and voice change.    Eyes: Negative for discharge, redness and itching.   Respiratory: Negative for cough, choking, shortness of breath and wheezing.    Cardiovascular: Negative for chest pain and palpitations.   Gastrointestinal: Negative for abdominal pain.        No reflux.   Musculoskeletal: Positive for gait problem. Negative for neck pain.   Neurological: Positive for dizziness. Negative for facial asymmetry, light-headedness and headaches.   Hematological: Negative for adenopathy. Does not bruise/bleed easily.   Psychiatric/Behavioral: Negative for agitation, behavioral problems, confusion and decreased concentration.       Objective:      Physical Exam   Constitutional: He is oriented to person, place, and time. Vital signs are normal. He appears well-developed and well-nourished. No distress.   HENT:   Head: Normocephalic and atraumatic.   Right Ear: Tympanic membrane, external ear and ear canal normal. Decreased hearing is noted.   Left Ear: Tympanic membrane, external ear and ear canal normal. Decreased hearing is noted.   Nose: Nose normal. No mucosal edema, rhinorrhea,  nasal deformity or septal deviation.   Mouth/Throat: Uvula is midline, oropharynx is clear and moist and mucous membranes are normal. No trismus in the jaw. Normal dentition. No uvula swelling. No oropharyngeal exudate or posterior oropharyngeal edema.   Cerumen impaction bilaterally   Eyes: Pupils are equal, round, and reactive to light. Conjunctivae and EOM are normal. Right eye exhibits no chemosis. Left eye exhibits no chemosis. Right conjunctiva is not injected. Left conjunctiva is not injected. No scleral icterus.   Neck: Trachea normal and phonation normal. No tracheal tenderness present. No tracheal deviation present. No thyroid mass and no thyromegaly present.   Cardiovascular: Intact distal pulses.   Pulmonary/Chest: Effort normal. No accessory muscle usage or stridor. No respiratory distress.   Lymphadenopathy:        Head (right side): No submental, no submandibular, no preauricular and no posterior auricular adenopathy present.        Head (left side): No submental, no submandibular, no preauricular and no posterior auricular adenopathy present.     He has no cervical adenopathy.        Right cervical: No superficial cervical and no deep cervical adenopathy present.       Left cervical: No superficial cervical and no deep cervical adenopathy present.   Neurological: He is alert and oriented to person, place, and time. No cranial nerve deficit.   Skin: Skin is warm and dry. No rash noted. No erythema.   Psychiatric: He has a normal mood and affect. His behavior is normal. Thought content normal.         Procedure Note    CHIEF COMPLAINT:  Cerumen Impaction    Description:  The patient was seated in an exam chair.  An ear speculum was placed in the right EAC and was examined under the microscope.  Suction and/or loop curettes were used to remove a large cerumen impaction.  The tympanic membrane was visualized and was normal in appearance.  The procedure was repeated on the left side in a similar fashion.   The TM was intact and normal on this side as well.  The patient tolerated the procedure well.  Assessment:       1. Perceived hearing changes    2. Dizziness        Plan:       Pt will need and audiogram and vestibular screening (possibly VNG) He is not mobile and will need special chair to test. Will plan these tests at [facility].       Cerumen impaction:  Removed today without difficulty.  I would recommend the use of a wax softening drop, either over the counter Debrox or mineral oil, on a weekly basis.  I also instructed the patient to avoid Qtips.

## 2019-09-04 ENCOUNTER — TELEPHONE (OUTPATIENT)
Dept: AUDIOLOGY | Facility: CLINIC | Age: 84
End: 2019-09-04

## 2019-09-04 NOTE — TELEPHONE ENCOUNTER
Patient did not come in for audiogram and VNG today. It looks as though he called and rescheduled his ENT appointment that was supposed to follow my appointments today. I attempted to contact pt to reschedule his procedure but his phone rang for an extended period of time then hung up. No VM is set up. He will need to have an audiogram and VNG prior to his ENT appointment on 9/18/19 at 11:00 am.

## 2019-09-13 DIAGNOSIS — J44.9 CHRONIC OBSTRUCTIVE PULMONARY DISEASE, UNSPECIFIED COPD TYPE: Primary | ICD-10-CM

## 2019-09-18 ENCOUNTER — CLINICAL SUPPORT (OUTPATIENT)
Dept: AUDIOLOGY | Facility: CLINIC | Age: 84
End: 2019-09-18
Payer: MEDICARE

## 2019-09-18 DIAGNOSIS — R42 DIZZINESS: ICD-10-CM

## 2019-09-18 DIAGNOSIS — H90.3 SENSORINEURAL HEARING LOSS, BILATERAL: Primary | ICD-10-CM

## 2019-09-18 PROCEDURE — 92557 COMPREHENSIVE HEARING TEST: CPT | Mod: S$GLB,,, | Performed by: AUDIOLOGIST-HEARING AID FITTER

## 2019-09-18 PROCEDURE — 92567 PR TYMPA2METRY: ICD-10-PCS | Mod: S$GLB,,, | Performed by: AUDIOLOGIST-HEARING AID FITTER

## 2019-09-18 PROCEDURE — 92557 PR COMPREHENSIVE HEARING TEST: ICD-10-PCS | Mod: S$GLB,,, | Performed by: AUDIOLOGIST-HEARING AID FITTER

## 2019-09-18 PROCEDURE — 92567 TYMPANOMETRY: CPT | Mod: S$GLB,,, | Performed by: AUDIOLOGIST-HEARING AID FITTER

## 2019-09-19 NOTE — PROGRESS NOTES
Referring Provider:Kaye Voss PA-C    Troy hCau was seen 09/19/2019 for an audiological evaluation.  Patient complains of bilateral hearing loss that has been progressive over many years. His family member present today reports it is difficult to communicate with him. He has a history of Alzheimers. He denies tinnitus, otalgia, and aural fullness. He was originally scheduled for an audiogram and VNG with an ENT appt to follow. The ENT appointment was rescheduled but not his procedures with audiology. He is just undergoing an audiogram today. His daughter reports he experiences lightheadedness when standing first thin in the morning. Or when the staff at the nursing home lifts him quickly. She reports he had testing for vertigo completed at Dr. Fine's office one year ago. The patient cannot stand w/o assistance and cannot move himself independently. He is also legally blind.     Results reveal a severe-to-profound sensorineural hearing loss 250-8000 Hz for the right ear, and a moderately severe to profound sensorineural hearing loss 250-8000 Hz for the left ear.   Speech Reception Thresholds were  65 dBHL for the right ear and 65 dBHL for the left ear.   Word recognition scores were fair for the right ear and good for the left ear.   Tympanograms were Type A, normal for the right ear and Type A, normal for the left ear.    Patient was counseled on the above findings.    Recommendations:  1. Review records from Dr. Fnie's office. I spoke with Karishma Peñaloza PA-C and she and I agreed that if his vestibular testing from that ENT office showed an abnormalities a referral for VRT can be placed based on that. Will forward those results to Kaye Voss PA-C when they arrive. Patient's daughter signed a release. She has power of .  2. Binaural hearing aids. Patient will be getting hearing aids through [facility]. Copy of audiogram provided.

## 2021-12-11 ENCOUNTER — HOSPITAL ENCOUNTER (EMERGENCY)
Facility: HOSPITAL | Age: 86
Discharge: HOME | End: 2021-12-11
Attending: EMERGENCY MEDICINE
Payer: MEDICARE

## 2021-12-11 VITALS
BODY MASS INDEX: 40.78 KG/M2 | OXYGEN SATURATION: 95 % | DIASTOLIC BLOOD PRESSURE: 80 MMHG | SYSTOLIC BLOOD PRESSURE: 128 MMHG | RESPIRATION RATE: 16 BRPM | WEIGHT: 237.63 LBS | HEART RATE: 87 BPM | TEMPERATURE: 98 F
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
## 2021-12-11 DIAGNOSIS — S00.83XA CONTUSION OF FACE, INITIAL ENCOUNTER: ICD-10-CM

## 2021-12-11 PROCEDURE — 99284 EMERGENCY DEPT VISIT MOD MDM: CPT | Mod: 25

## 2021-12-11 RX ORDER — POLYETHYLENE GLYCOL 3350 17 G/17G
POWDER, FOR SOLUTION ORAL
COMMUNITY

## 2021-12-11 RX ORDER — DOCUSATE SODIUM 100 MG/1
100 CAPSULE, LIQUID FILLED ORAL 2 TIMES DAILY
COMMUNITY

## 2021-12-11 RX ORDER — TRAZODONE HYDROCHLORIDE 100 MG/1
100 TABLET ORAL NIGHTLY
COMMUNITY

## 2021-12-11 RX ORDER — METFORMIN HYDROCHLORIDE 1000 MG/1
1000 TABLET ORAL 2 TIMES DAILY WITH MEALS
COMMUNITY

## 2021-12-11 RX ORDER — SENNOSIDES 8.6 MG/1
1 TABLET ORAL DAILY
Status: ON HOLD | COMMUNITY
End: 2024-03-09 | Stop reason: HOSPADM

## 2023-03-25 NOTE — ASSESSMENT & PLAN NOTE
- due to stress state s/p fall     PRINCIPAL DISCHARGE DIAGNOSIS  Diagnosis: Small bowel obstruction  Assessment and Plan of Treatment:

## 2024-03-06 ENCOUNTER — HOSPITAL ENCOUNTER (INPATIENT)
Facility: HOSPITAL | Age: 89
LOS: 3 days | Discharge: HOSPICE | DRG: 871 | End: 2024-03-09
Attending: EMERGENCY MEDICINE | Admitting: INTERNAL MEDICINE
Payer: MEDICARE

## 2024-03-06 DIAGNOSIS — R06.02 SOB (SHORTNESS OF BREATH): ICD-10-CM

## 2024-03-06 DIAGNOSIS — R07.9 CHEST PAIN: ICD-10-CM

## 2024-03-06 DIAGNOSIS — J18.9 PNEUMONIA OF BOTH LOWER LOBES DUE TO INFECTIOUS ORGANISM: ICD-10-CM

## 2024-03-06 DIAGNOSIS — R79.89 ELEVATED TROPONIN: ICD-10-CM

## 2024-03-06 DIAGNOSIS — R09.02 HYPOXIA: ICD-10-CM

## 2024-03-06 PROBLEM — E78.5 HYPERLIPOPROTEINEMIA: Status: ACTIVE | Noted: 2018-06-24

## 2024-03-06 PROBLEM — F02.80 LATE ONSET ALZHEIMER'S DISEASE WITHOUT BEHAVIORAL DISTURBANCE: Status: ACTIVE | Noted: 2019-09-03

## 2024-03-06 PROBLEM — I70.0 AORTIC ATHEROSCLEROSIS: Status: ACTIVE | Noted: 2019-04-08

## 2024-03-06 PROBLEM — E11.65 TYPE 2 DIABETES MELLITUS WITH HYPERGLYCEMIA: Status: ACTIVE | Noted: 2024-03-06

## 2024-03-06 PROBLEM — H35.3232 EXUDATIVE AGE-RELATED MACULAR DEGENERATION, BILATERAL, WITH INACTIVE CHOROIDAL NEOVASCULARIZATION: Status: ACTIVE | Noted: 2024-03-06

## 2024-03-06 PROBLEM — R42 VERTIGO: Status: ACTIVE | Noted: 2019-10-28

## 2024-03-06 PROBLEM — D64.9 ANEMIA: Status: ACTIVE | Noted: 2019-10-10

## 2024-03-06 PROBLEM — G30.1 LATE ONSET ALZHEIMER'S DISEASE WITHOUT BEHAVIORAL DISTURBANCE: Status: ACTIVE | Noted: 2019-09-03

## 2024-03-06 PROBLEM — F32.9 CURRENT EPISODE OF MAJOR DEPRESSIVE DISORDER WITHOUT PRIOR EPISODE: Status: ACTIVE | Noted: 2020-01-24

## 2024-03-06 PROBLEM — J41.0 SIMPLE CHRONIC BRONCHITIS: Status: ACTIVE | Noted: 2019-07-24

## 2024-03-06 PROBLEM — J96.01 ACUTE HYPOXEMIC RESPIRATORY FAILURE: Status: ACTIVE | Noted: 2024-03-06

## 2024-03-06 PROBLEM — K59.03 DRUG-INDUCED CONSTIPATION: Status: ACTIVE | Noted: 2019-08-02

## 2024-03-06 PROBLEM — I11.0 HYPERTENSIVE HEART DISEASE WITH HEART FAILURE: Status: ACTIVE | Noted: 2024-03-06

## 2024-03-06 PROBLEM — A41.9 SEPSIS: Status: ACTIVE | Noted: 2024-03-06

## 2024-03-06 LAB
ALBUMIN SERPL BCP-MCNC: 3.3 G/DL (ref 3.5–5.2)
ALLENS TEST: ABNORMAL
ALP SERPL-CCNC: 84 U/L (ref 55–135)
ALT SERPL W/O P-5'-P-CCNC: 12 U/L (ref 10–44)
ANION GAP SERPL CALC-SCNC: 12 MMOL/L (ref 8–16)
ANISOCYTOSIS BLD QL SMEAR: SLIGHT
AST SERPL-CCNC: 15 U/L (ref 10–40)
BACTERIA #/AREA URNS HPF: ABNORMAL /HPF
BASOPHILS # BLD AUTO: 0.03 K/UL (ref 0–0.2)
BASOPHILS NFR BLD: 0.1 % (ref 0–1.9)
BILIRUB SERPL-MCNC: 0.4 MG/DL (ref 0.1–1)
BILIRUB UR QL STRIP: NEGATIVE
BNP SERPL-MCNC: 43 PG/ML (ref 0–99)
BUN SERPL-MCNC: 15 MG/DL (ref 8–23)
CALCIUM SERPL-MCNC: 8.9 MG/DL (ref 8.7–10.5)
CHLORIDE SERPL-SCNC: 93 MMOL/L (ref 95–110)
CK SERPL-CCNC: 32 U/L (ref 20–200)
CLARITY UR: CLEAR
CO2 SERPL-SCNC: 26 MMOL/L (ref 23–29)
COLOR UR: YELLOW
CREAT SERPL-MCNC: 1.1 MG/DL (ref 0.5–1.4)
DACRYOCYTES BLD QL SMEAR: ABNORMAL
DELSYS: ABNORMAL
DIFFERENTIAL METHOD BLD: ABNORMAL
EOSINOPHIL # BLD AUTO: 0.1 K/UL (ref 0–0.5)
EOSINOPHIL NFR BLD: 0.3 % (ref 0–8)
ERYTHROCYTE [DISTWIDTH] IN BLOOD BY AUTOMATED COUNT: 14.5 % (ref 11.5–14.5)
EST. GFR  (NO RACE VARIABLE): >60 ML/MIN/1.73 M^2
GLUCOSE SERPL-MCNC: 251 MG/DL (ref 70–110)
GLUCOSE UR QL STRIP: ABNORMAL
HCO3 UR-SCNC: 28 MMOL/L (ref 24–28)
HCT VFR BLD AUTO: 32.2 % (ref 40–54)
HGB BLD-MCNC: 10.3 G/DL (ref 14–18)
HGB UR QL STRIP: NEGATIVE
HYALINE CASTS #/AREA URNS LPF: 18 /LPF
IMM GRANULOCYTES # BLD AUTO: 0.2 K/UL (ref 0–0.04)
IMM GRANULOCYTES NFR BLD AUTO: 0.8 % (ref 0–0.5)
INFLUENZA A, MOLECULAR: NEGATIVE
INFLUENZA B, MOLECULAR: NEGATIVE
KETONES UR QL STRIP: NEGATIVE
LACTATE SERPL-SCNC: 2.2 MMOL/L (ref 0.5–2.2)
LEUKOCYTE ESTERASE UR QL STRIP: NEGATIVE
LYMPHOCYTES # BLD AUTO: 0.5 K/UL (ref 1–4.8)
LYMPHOCYTES NFR BLD: 1.9 % (ref 18–48)
MCH RBC QN AUTO: 27.8 PG (ref 27–31)
MCHC RBC AUTO-ENTMCNC: 32 G/DL (ref 32–36)
MCV RBC AUTO: 87 FL (ref 82–98)
MICROSCOPIC COMMENT: ABNORMAL
MODE: ABNORMAL
MONOCYTES # BLD AUTO: 1.2 K/UL (ref 0.3–1)
MONOCYTES NFR BLD: 4.5 % (ref 4–15)
NEUTROPHILS # BLD AUTO: 24.5 K/UL (ref 1.8–7.7)
NEUTROPHILS NFR BLD: 92.4 % (ref 38–73)
NITRITE UR QL STRIP: NEGATIVE
NRBC BLD-RTO: 0 /100 WBC
PCO2 BLDA: 52.3 MMHG (ref 35–45)
PH SMN: 7.34 [PH] (ref 7.35–7.45)
PH UR STRIP: 6 [PH] (ref 5–8)
PLATELET # BLD AUTO: 284 K/UL (ref 150–450)
PLATELET BLD QL SMEAR: ABNORMAL
PMV BLD AUTO: 8.6 FL (ref 9.2–12.9)
PO2 BLDA: 57 MMHG (ref 80–100)
POC BE: 2 MMOL/L
POC SATURATED O2: 87 % (ref 95–100)
POCT GLUCOSE: 171 MG/DL (ref 70–110)
POCT GLUCOSE: 91 MG/DL (ref 70–110)
POTASSIUM SERPL-SCNC: 4.7 MMOL/L (ref 3.5–5.1)
PROCALCITONIN SERPL IA-MCNC: 0.33 NG/ML
PROT SERPL-MCNC: 6.9 G/DL (ref 6–8.4)
PROT UR QL STRIP: ABNORMAL
RBC # BLD AUTO: 3.7 M/UL (ref 4.6–6.2)
RBC #/AREA URNS HPF: 2 /HPF (ref 0–4)
SAMPLE: ABNORMAL
SARS-COV-2 RDRP RESP QL NAA+PROBE: NEGATIVE
SCHISTOCYTES BLD QL SMEAR: PRESENT
SITE: ABNORMAL
SODIUM SERPL-SCNC: 131 MMOL/L (ref 136–145)
SP GR UR STRIP: 1.02 (ref 1–1.03)
SP02: 95
SPECIMEN SOURCE: NORMAL
TROPONIN I SERPL DL<=0.01 NG/ML-MCNC: 0.05 NG/ML (ref 0–0.03)
URN SPEC COLLECT METH UR: ABNORMAL
UROBILINOGEN UR STRIP-ACNC: NEGATIVE EU/DL
WBC # BLD AUTO: 26.51 K/UL (ref 3.9–12.7)
WBC #/AREA URNS HPF: 0 /HPF (ref 0–5)

## 2024-03-06 PROCEDURE — 83880 ASSAY OF NATRIURETIC PEPTIDE: CPT | Performed by: EMERGENCY MEDICINE

## 2024-03-06 PROCEDURE — 25500020 PHARM REV CODE 255: Performed by: EMERGENCY MEDICINE

## 2024-03-06 PROCEDURE — 83605 ASSAY OF LACTIC ACID: CPT | Performed by: NURSE PRACTITIONER

## 2024-03-06 PROCEDURE — 84484 ASSAY OF TROPONIN QUANT: CPT | Performed by: EMERGENCY MEDICINE

## 2024-03-06 PROCEDURE — 36600 WITHDRAWAL OF ARTERIAL BLOOD: CPT

## 2024-03-06 PROCEDURE — 80053 COMPREHEN METABOLIC PANEL: CPT | Performed by: EMERGENCY MEDICINE

## 2024-03-06 PROCEDURE — 99285 EMERGENCY DEPT VISIT HI MDM: CPT | Mod: 25

## 2024-03-06 PROCEDURE — 25000003 PHARM REV CODE 250: Performed by: NURSE PRACTITIONER

## 2024-03-06 PROCEDURE — 82803 BLOOD GASES ANY COMBINATION: CPT

## 2024-03-06 PROCEDURE — 87502 INFLUENZA DNA AMP PROBE: CPT | Performed by: EMERGENCY MEDICINE

## 2024-03-06 PROCEDURE — 63600175 PHARM REV CODE 636 W HCPCS: Performed by: NURSE PRACTITIONER

## 2024-03-06 PROCEDURE — 25000003 PHARM REV CODE 250: Performed by: INTERNAL MEDICINE

## 2024-03-06 PROCEDURE — 93005 ELECTROCARDIOGRAM TRACING: CPT

## 2024-03-06 PROCEDURE — U0002 COVID-19 LAB TEST NON-CDC: HCPCS | Performed by: EMERGENCY MEDICINE

## 2024-03-06 PROCEDURE — 63600175 PHARM REV CODE 636 W HCPCS: Performed by: INTERNAL MEDICINE

## 2024-03-06 PROCEDURE — 94640 AIRWAY INHALATION TREATMENT: CPT

## 2024-03-06 PROCEDURE — 93010 ELECTROCARDIOGRAM REPORT: CPT | Mod: ,,, | Performed by: INTERNAL MEDICINE

## 2024-03-06 PROCEDURE — 25000242 PHARM REV CODE 250 ALT 637 W/ HCPCS: Performed by: EMERGENCY MEDICINE

## 2024-03-06 PROCEDURE — 99900035 HC TECH TIME PER 15 MIN (STAT)

## 2024-03-06 PROCEDURE — 85025 COMPLETE CBC W/AUTO DIFF WBC: CPT | Performed by: EMERGENCY MEDICINE

## 2024-03-06 PROCEDURE — 11000001 HC ACUTE MED/SURG PRIVATE ROOM

## 2024-03-06 PROCEDURE — 87040 BLOOD CULTURE FOR BACTERIA: CPT | Mod: 59 | Performed by: EMERGENCY MEDICINE

## 2024-03-06 PROCEDURE — 81000 URINALYSIS NONAUTO W/SCOPE: CPT | Performed by: EMERGENCY MEDICINE

## 2024-03-06 PROCEDURE — 82550 ASSAY OF CK (CPK): CPT | Performed by: EMERGENCY MEDICINE

## 2024-03-06 PROCEDURE — 84145 PROCALCITONIN (PCT): CPT | Performed by: NURSE PRACTITIONER

## 2024-03-06 RX ORDER — ACETAMINOPHEN 325 MG/1
650 TABLET ORAL EVERY 4 HOURS PRN
Status: DISCONTINUED | OUTPATIENT
Start: 2024-03-06 | End: 2024-03-09 | Stop reason: HOSPADM

## 2024-03-06 RX ORDER — MECLIZINE HCL 12.5 MG 12.5 MG/1
12.5 TABLET ORAL 2 TIMES DAILY
COMMUNITY
Start: 2024-03-05

## 2024-03-06 RX ORDER — SODIUM CHLORIDE FOR INHALATION 3 %
4 VIAL, NEBULIZER (ML) INHALATION ONCE
Status: DISCONTINUED | OUTPATIENT
Start: 2024-03-06 | End: 2024-03-09 | Stop reason: HOSPADM

## 2024-03-06 RX ORDER — TRAZODONE HYDROCHLORIDE 100 MG/1
100 TABLET ORAL NIGHTLY
Status: DISCONTINUED | OUTPATIENT
Start: 2024-03-06 | End: 2024-03-09 | Stop reason: HOSPADM

## 2024-03-06 RX ORDER — IBUPROFEN 200 MG
16 TABLET ORAL
Status: DISCONTINUED | OUTPATIENT
Start: 2024-03-06 | End: 2024-03-09 | Stop reason: HOSPADM

## 2024-03-06 RX ORDER — IPRATROPIUM BROMIDE AND ALBUTEROL SULFATE 2.5; .5 MG/3ML; MG/3ML
3 SOLUTION RESPIRATORY (INHALATION) EVERY 4 HOURS PRN
COMMUNITY
Start: 2024-03-06 | End: 2024-03-06

## 2024-03-06 RX ORDER — TALC
6 POWDER (GRAM) TOPICAL NIGHTLY PRN
Status: DISCONTINUED | OUTPATIENT
Start: 2024-03-06 | End: 2024-03-09 | Stop reason: HOSPADM

## 2024-03-06 RX ORDER — POTASSIUM CHLORIDE 750 MG/1
10 CAPSULE, EXTENDED RELEASE ORAL DAILY
COMMUNITY
Start: 2024-02-16

## 2024-03-06 RX ORDER — AMLODIPINE BESYLATE 5 MG/1
5 TABLET ORAL DAILY
Status: DISCONTINUED | OUTPATIENT
Start: 2024-03-07 | End: 2024-03-09 | Stop reason: HOSPADM

## 2024-03-06 RX ORDER — GUAIFENESIN 600 MG/1
600 TABLET, EXTENDED RELEASE ORAL 2 TIMES DAILY
Status: DISCONTINUED | OUTPATIENT
Start: 2024-03-06 | End: 2024-03-09 | Stop reason: HOSPADM

## 2024-03-06 RX ORDER — DICLOFENAC SODIUM 10 MG/G
2 GEL TOPICAL 3 TIMES DAILY
COMMUNITY

## 2024-03-06 RX ORDER — MECLIZINE HCL 12.5 MG 12.5 MG/1
12.5 TABLET ORAL 2 TIMES DAILY
Status: DISCONTINUED | OUTPATIENT
Start: 2024-03-06 | End: 2024-03-09 | Stop reason: HOSPADM

## 2024-03-06 RX ORDER — ESCITALOPRAM OXALATE 10 MG/1
10 TABLET ORAL DAILY
Status: DISCONTINUED | OUTPATIENT
Start: 2024-03-07 | End: 2024-03-09 | Stop reason: HOSPADM

## 2024-03-06 RX ORDER — ONDANSETRON HYDROCHLORIDE 2 MG/ML
4 INJECTION, SOLUTION INTRAVENOUS EVERY 8 HOURS PRN
Status: DISCONTINUED | OUTPATIENT
Start: 2024-03-06 | End: 2024-03-09 | Stop reason: HOSPADM

## 2024-03-06 RX ORDER — FUROSEMIDE 40 MG/1
40 TABLET ORAL DAILY
Status: DISCONTINUED | OUTPATIENT
Start: 2024-03-07 | End: 2024-03-09 | Stop reason: HOSPADM

## 2024-03-06 RX ORDER — SENNOSIDES 8.6 MG/1
1 TABLET ORAL DAILY
Status: DISCONTINUED | OUTPATIENT
Start: 2024-03-07 | End: 2024-03-09 | Stop reason: HOSPADM

## 2024-03-06 RX ORDER — ONDANSETRON 4 MG/1
4 TABLET, FILM COATED ORAL EVERY 8 HOURS PRN
COMMUNITY

## 2024-03-06 RX ORDER — ALPRAZOLAM 0.25 MG/1
0.25 TABLET ORAL 2 TIMES DAILY PRN
Status: DISCONTINUED | OUTPATIENT
Start: 2024-03-06 | End: 2024-03-09 | Stop reason: HOSPADM

## 2024-03-06 RX ORDER — PROCHLORPERAZINE EDISYLATE 5 MG/ML
5 INJECTION INTRAMUSCULAR; INTRAVENOUS EVERY 6 HOURS PRN
Status: DISCONTINUED | OUTPATIENT
Start: 2024-03-06 | End: 2024-03-09 | Stop reason: HOSPADM

## 2024-03-06 RX ORDER — NALOXONE HCL 0.4 MG/ML
0.02 VIAL (ML) INJECTION
Status: DISCONTINUED | OUTPATIENT
Start: 2024-03-06 | End: 2024-03-09 | Stop reason: HOSPADM

## 2024-03-06 RX ORDER — DOCUSATE SODIUM 100 MG/1
100 CAPSULE, LIQUID FILLED ORAL 2 TIMES DAILY
Status: DISCONTINUED | OUTPATIENT
Start: 2024-03-06 | End: 2024-03-09 | Stop reason: HOSPADM

## 2024-03-06 RX ORDER — GLUCAGON 1 MG
1 KIT INJECTION
Status: DISCONTINUED | OUTPATIENT
Start: 2024-03-06 | End: 2024-03-09 | Stop reason: HOSPADM

## 2024-03-06 RX ORDER — IPRATROPIUM BROMIDE AND ALBUTEROL SULFATE 2.5; .5 MG/3ML; MG/3ML
3 SOLUTION RESPIRATORY (INHALATION) EVERY 6 HOURS
Status: DISCONTINUED | OUTPATIENT
Start: 2024-03-06 | End: 2024-03-09

## 2024-03-06 RX ORDER — IPRATROPIUM BROMIDE AND ALBUTEROL SULFATE 2.5; .5 MG/3ML; MG/3ML
3 SOLUTION RESPIRATORY (INHALATION)
Status: COMPLETED | OUTPATIENT
Start: 2024-03-06 | End: 2024-03-06

## 2024-03-06 RX ORDER — POTASSIUM CHLORIDE 750 MG/1
10 TABLET, EXTENDED RELEASE ORAL DAILY
Status: DISCONTINUED | OUTPATIENT
Start: 2024-03-07 | End: 2024-03-09 | Stop reason: HOSPADM

## 2024-03-06 RX ORDER — IBUPROFEN 200 MG
24 TABLET ORAL
Status: DISCONTINUED | OUTPATIENT
Start: 2024-03-06 | End: 2024-03-09 | Stop reason: HOSPADM

## 2024-03-06 RX ORDER — INSULIN ASPART 100 [IU]/ML
0-5 INJECTION, SOLUTION INTRAVENOUS; SUBCUTANEOUS
Status: DISCONTINUED | OUTPATIENT
Start: 2024-03-06 | End: 2024-03-09

## 2024-03-06 RX ORDER — SODIUM CHLORIDE 0.9 % (FLUSH) 0.9 %
10 SYRINGE (ML) INJECTION EVERY 12 HOURS PRN
Status: DISCONTINUED | OUTPATIENT
Start: 2024-03-06 | End: 2024-03-09 | Stop reason: HOSPADM

## 2024-03-06 RX ORDER — ALPRAZOLAM 0.25 MG/1
0.25 TABLET ORAL 2 TIMES DAILY PRN
Status: ON HOLD | COMMUNITY
Start: 2024-01-26 | End: 2024-03-09

## 2024-03-06 RX ORDER — ENOXAPARIN SODIUM 100 MG/ML
40 INJECTION SUBCUTANEOUS EVERY 12 HOURS
Status: DISCONTINUED | OUTPATIENT
Start: 2024-03-06 | End: 2024-03-07

## 2024-03-06 RX ORDER — HYDROXYZINE PAMOATE 50 MG/1
50 CAPSULE ORAL 3 TIMES DAILY
COMMUNITY
Start: 2024-03-01

## 2024-03-06 RX ORDER — ESCITALOPRAM OXALATE 10 MG/1
10 TABLET ORAL DAILY
COMMUNITY
Start: 2024-02-13

## 2024-03-06 RX ORDER — ALUMINUM HYDROXIDE, MAGNESIUM HYDROXIDE, AND SIMETHICONE 1200; 120; 1200 MG/30ML; MG/30ML; MG/30ML
30 SUSPENSION ORAL 4 TIMES DAILY PRN
Status: DISCONTINUED | OUTPATIENT
Start: 2024-03-06 | End: 2024-03-09 | Stop reason: HOSPADM

## 2024-03-06 RX ORDER — POLYETHYLENE GLYCOL 3350 17 G/17G
17 POWDER, FOR SOLUTION ORAL DAILY
Status: DISCONTINUED | OUTPATIENT
Start: 2024-03-06 | End: 2024-03-09 | Stop reason: HOSPADM

## 2024-03-06 RX ORDER — TAMSULOSIN HYDROCHLORIDE 0.4 MG/1
0.4 CAPSULE ORAL DAILY
Status: DISCONTINUED | OUTPATIENT
Start: 2024-03-07 | End: 2024-03-09 | Stop reason: HOSPADM

## 2024-03-06 RX ADMIN — IPRATROPIUM BROMIDE AND ALBUTEROL SULFATE 3 ML: 2.5; .5 SOLUTION RESPIRATORY (INHALATION) at 12:03

## 2024-03-06 RX ADMIN — POLYETHYLENE GLYCOL 3350 17 G: 17 POWDER, FOR SOLUTION ORAL at 04:03

## 2024-03-06 RX ADMIN — GUAIFENESIN 600 MG: 600 TABLET, EXTENDED RELEASE ORAL at 04:03

## 2024-03-06 RX ADMIN — TRAZODONE HYDROCHLORIDE 100 MG: 100 TABLET ORAL at 08:03

## 2024-03-06 RX ADMIN — ENOXAPARIN SODIUM 40 MG: 40 INJECTION SUBCUTANEOUS at 04:03

## 2024-03-06 RX ADMIN — DOCUSATE SODIUM 100 MG: 100 CAPSULE, LIQUID FILLED ORAL at 08:03

## 2024-03-06 RX ADMIN — CEFEPIME 2 G: 2 INJECTION, POWDER, FOR SOLUTION INTRAVENOUS at 04:03

## 2024-03-06 RX ADMIN — VANCOMYCIN HYDROCHLORIDE 2000 MG: 500 INJECTION, POWDER, LYOPHILIZED, FOR SOLUTION INTRAVENOUS at 06:03

## 2024-03-06 RX ADMIN — IOHEXOL 100 ML: 350 INJECTION, SOLUTION INTRAVENOUS at 02:03

## 2024-03-06 NOTE — SUBJECTIVE & OBJECTIVE
Past Medical History:   Diagnosis Date    Arthritis     BPH (benign prostatic hyperplasia)     COPD (chronic obstructive pulmonary disease)     Depression     Hyperlipidemia     Hyperlipidemia     Hypertension     Smoker     2 pack a day       Past Surgical History:   Procedure Laterality Date    APPENDECTOMY      APPENDECTOMY      CHOLECYSTECTOMY      COLONOSCOPY      EYE SURGERY         Review of patient's allergies indicates:  No Known Allergies    No current facility-administered medications on file prior to encounter.     Current Outpatient Medications on File Prior to Encounter   Medication Sig    acetaminophen (TYLENOL) suppository Place 650 mg rectally every 4 (four) hours as needed for Temperature greater than.    ALPRAZolam (XANAX) 0.25 MG tablet Take 0.25 mg by mouth 2 (two) times daily as needed for Anxiety.    amLODIPine (NORVASC) 5 MG tablet Take 5 mg by mouth once daily.    docusate sodium (COLACE) 100 MG capsule Take 100 mg by mouth 2 (two) times daily.    EScitalopram oxalate (LEXAPRO) 10 MG tablet Take 10 mg by mouth once daily.    furosemide (LASIX) 40 MG tablet Take 40 mg by mouth once daily.    hydrOXYzine pamoate (VISTARIL) 50 MG Cap Take 50 mg by mouth 3 (three) times daily.    meclizine (ANTIVERT) 12.5 mg tablet Take 12.5 mg by mouth 2 (two) times daily.    metFORMIN (GLUCOPHAGE) 1000 MG tablet Take 1,000 mg by mouth 2 (two) times daily with meals.    polyethylene glycol (GLYCOLAX) 17 gram PwPk Take by mouth.    potassium chloride (MICRO-K) 10 MEQ CpSR Take 10 mEq by mouth once daily.    senna (SENOKOT) 8.6 mg tablet Take 1 tablet by mouth once daily.    tamsulosin (FLOMAX) 0.4 mg Cap Take 0.4 mg by mouth once daily.    traZODone (DESYREL) 100 MG tablet Take 100 mg by mouth every evening.    [DISCONTINUED] albuterol-ipratropium (DUO-NEB) 2.5 mg-0.5 mg/3 mL nebulizer solution Take 3 mLs by nebulization every 4 (four) hours as needed.    diclofenac sodium (VOLTAREN) 1 % Gel Apply 2 g topically  3 (three) times daily.    loratadine (CLARITIN ORAL) Take 1 tablet by mouth daily as needed.    ondansetron (ZOFRAN) 4 MG tablet Take 4 mg by mouth every 8 (eight) hours as needed for Nausea.    [DISCONTINUED] albuterol (PROVENTIL) 2.5 mg /3 mL (0.083 %) nebulizer solution Take 2.5 mg by nebulization every 6 (six) hours as needed for Wheezing. Rescue    [DISCONTINUED] aspirin (ECOTRIN) 81 MG EC tablet Take 81 mg by mouth once daily.    [DISCONTINUED] atorvastatin (LIPITOR) 10 MG tablet Take 10 mg by mouth once daily.    [DISCONTINUED] buPROPion (WELLBUTRIN XL) 300 MG 24 hr tablet Take 300 mg by mouth once daily.    [DISCONTINUED] meclizine (ANTIVERT) 25 mg tablet Take 1 tablet (25 mg total) by mouth 3 (three) times daily as needed for Dizziness. (Patient taking differently: Take 12.5 mg by mouth 2 (two) times a day.)    [DISCONTINUED] potassium chloride (KLOR-CON) 10 MEQ TbSR Take 10 mEq by mouth once.     Family History       Problem Relation (Age of Onset)    Diabetes Mother    Heart disease Father          Tobacco Use    Smoking status: Every Day     Current packs/day: 2.00     Average packs/day: 2.0 packs/day for 60.0 years (120.0 ttl pk-yrs)     Types: Cigarettes    Smokeless tobacco: Never   Substance and Sexual Activity    Alcohol use: Yes     Comment: daily bourbon    Drug use: No    Sexual activity: Not on file     Review of Systems   Reason unable to perform ROS: Limited secondary to dementia.   Constitutional:  Positive for fatigue.   Respiratory:  Positive for cough and shortness of breath.    All other systems reviewed and are negative.    Objective:     Vital Signs (Most Recent):  Temp: 98.6 °F (37 °C) (03/06/24 1107)  Pulse: 79 (03/06/24 1435)  Resp: 19 (03/06/24 1435)  BP: (!) 136/58 (03/06/24 1435)  SpO2: 97 % (03/06/24 1438) Vital Signs (24h Range):  Temp:  [98.6 °F (37 °C)] 98.6 °F (37 °C)  Pulse:  [70-81] 79  Resp:  [18-33] 19  SpO2:  [93 %-98 %] 97 %  BP: (117-136)/(56-75) 136/58     Weight:  107.3 kg (236 lb 8.9 oz)  Body mass index is 40.6 kg/m².     Physical Exam  Vitals and nursing note reviewed.   Constitutional:       General: He is not in acute distress.     Appearance: He is overweight. He is ill-appearing. He is not diaphoretic.   HENT:      Head: Normocephalic and atraumatic.      Right Ear: Hearing and external ear normal.      Left Ear: Hearing and external ear normal.      Nose: Nose normal. No mucosal edema or rhinorrhea.      Mouth/Throat:      Pharynx: Uvula midline.   Eyes:      General:         Right eye: No discharge.         Left eye: No discharge.      Conjunctiva/sclera: Conjunctivae normal.      Right eye: No chemosis.     Left eye: No chemosis.     Pupils: Pupils are equal, round, and reactive to light.   Neck:      Thyroid: No thyroid mass or thyromegaly.      Trachea: Trachea normal.   Cardiovascular:      Rate and Rhythm: Normal rate and regular rhythm.      Pulses:           Dorsalis pedis pulses are 2+ on the right side and 2+ on the left side.      Heart sounds: Normal heart sounds. No murmur heard.  Pulmonary:      Effort: Pulmonary effort is normal. Tachypnea present. No respiratory distress.      Breath sounds: Examination of the right-lower field reveals decreased breath sounds. Examination of the left-lower field reveals decreased breath sounds. Decreased breath sounds present. No wheezing.   Abdominal:      General: Bowel sounds are decreased. There is distension.      Palpations: Abdomen is soft.      Tenderness: There is no abdominal tenderness.   Musculoskeletal:         General: Normal range of motion.      Cervical back: Normal range of motion and neck supple.   Lymphadenopathy:      Cervical: No cervical adenopathy.      Upper Body:      Right upper body: No supraclavicular adenopathy.      Left upper body: No supraclavicular adenopathy.   Skin:     General: Skin is warm and dry.      Capillary Refill: Capillary refill takes less than 2 seconds.      Coloration:  Skin is pale.      Findings: No rash.   Neurological:      Mental Status: He is alert. Mental status is at baseline.   Psychiatric:         Mood and Affect: Mood is not anxious.         Speech: Speech normal.         Behavior: Behavior normal.         Thought Content: Thought content normal.         Judgment: Judgment normal.              CRANIAL NERVES     CN III, IV, VI   Pupils are equal, round, and reactive to light.       Significant Labs: All pertinent labs within the past 24 hours have been reviewed.  CBC:   Recent Labs   Lab 03/06/24  1154   WBC 26.51*   HGB 10.3*   HCT 32.2*        CMP:   Recent Labs   Lab 03/06/24  1154   *   K 4.7   CL 93*   CO2 26   *   BUN 15   CREATININE 1.1   CALCIUM 8.9   PROT 6.9   ALBUMIN 3.3*   BILITOT 0.4   ALKPHOS 84   AST 15   ALT 12   ANIONGAP 12     Cardiac Markers:   Recent Labs   Lab 03/06/24  1154   BNP 43     Troponin:   Recent Labs   Lab 03/06/24  1154   TROPONINI 0.053*       Significant Imaging: I have reviewed all pertinent imaging results/findings within the past 24 hours.

## 2024-03-06 NOTE — ASSESSMENT & PLAN NOTE
Reported hypoxia, SOB at NH, per AASI 88%, placed on 2L NC. CT chest: Focal consolidation in the right lung base with some small consolidation in the left lung base.     --Supportive O2 to maintain SpO2 >92%  --labs: blood cultures, Lactic Acid, Procal  --Sputum Culture  --Vanc/Cefepime

## 2024-03-06 NOTE — PHARMACY MED REC
"Admission Medication History     The home medication history was taken by Sophy Mensah.    You may go to "Admission" then "Reconcile Home Medications" tabs to review and/or act upon these items.     The home medication list has been updated by the Pharmacy department.   Please read ALL comments highlighted in yellow.   Please address this information as you see fit.    Feel free to contact us if you have any questions or require assistance.      The medications listed below were removed from the home medication list. Please reorder if appropriate:  Patient reports no longer taking the following medication(s):  pROVENTIL 2.5 MG/ 3 ml nebulizer solution  Asprin 81 mg  Atorvastatin 10 mg  Wellbutrin  mg      Medications listed below were obtained from: Patient/family, [employer], and Nursing home  (Not in a hospital admission)      LAST MED REC COMPLETED:         Sophysamy Markson  [id number]      Current Outpatient Medications on File Prior to Encounter   Medication Sig Dispense Refill Last Dose    acetaminophen (TYLENOL) suppository Place 650 mg rectally every 4 (four) hours as needed for Temperature greater than.   3/5/2024    ALPRAZolam (XANAX) 0.25 MG tablet Take 0.25 mg by mouth 2 (two) times daily as needed for Anxiety.   3/5/2024    amLODIPine (NORVASC) 5 MG tablet Take 5 mg by mouth once daily.   3/5/2024    docusate sodium (COLACE) 100 MG capsule Take 100 mg by mouth 2 (two) times daily.   3/5/2024    EScitalopram oxalate (LEXAPRO) 10 MG tablet Take 10 mg by mouth once daily.   3/5/2024    furosemide (LASIX) 40 MG tablet Take 40 mg by mouth once daily.   3/6/2024    hydrOXYzine pamoate (VISTARIL) 50 MG Cap Take 50 mg by mouth 3 (three) times daily.   3/5/2024    meclizine (ANTIVERT) 12.5 mg tablet Take 12.5 mg by mouth 2 (two) times daily.   3/5/2024    metFORMIN (GLUCOPHAGE) 1000 MG tablet Take 1,000 mg by mouth 2 (two) times daily with meals.   3/5/2024    polyethylene glycol " (GLYCOLAX) 17 gram PwPk Take by mouth.   3/5/2024    potassium chloride (MICRO-K) 10 MEQ CpSR Take 10 mEq by mouth once daily.   3/5/2024    senna (SENOKOT) 8.6 mg tablet Take 1 tablet by mouth once daily.   3/5/2024    tamsulosin (FLOMAX) 0.4 mg Cap Take 0.4 mg by mouth once daily.   3/5/2024    traZODone (DESYREL) 100 MG tablet Take 100 mg by mouth every evening.   3/5/2024    [DISCONTINUED] albuterol-ipratropium (DUO-NEB) 2.5 mg-0.5 mg/3 mL nebulizer solution Take 3 mLs by nebulization every 4 (four) hours as needed.       diclofenac sodium (VOLTAREN) 1 % Gel Apply 2 g topically 3 (three) times daily.   More than a month    loratadine (CLARITIN ORAL) Take 1 tablet by mouth daily as needed.   Unknown    ondansetron (ZOFRAN) 4 MG tablet Take 4 mg by mouth every 8 (eight) hours as needed for Nausea.   More than a month                           .

## 2024-03-06 NOTE — PROGRESS NOTES
Pharmacist Renal Dose Adjustment Note    Troy Chau is a 88 y.o. male being treated with the medication cefepime    Patient Data:    Vital Signs (Most Recent):  Temp: 98.6 °F (37 °C) (03/06/24 1107)  Pulse: 79 (03/06/24 1435)  Resp: 19 (03/06/24 1435)  BP: (!) 136/58 (03/06/24 1435)  SpO2: 97 % (03/06/24 1438) Vital Signs (72h Range):  Temp:  [98.6 °F (37 °C)]   Pulse:  [70-81]   Resp:  [18-33]   BP: (117-136)/(56-75)   SpO2:  [93 %-98 %]      Recent Labs   Lab 03/06/24  1154   CREATININE 1.1     Serum creatinine: 1.1 mg/dL 03/06/24 1154  Estimated creatinine clearance: 51.5 mL/min    Medication:cefepime 1 gm iv q12hrs will be changed to medication:cefepime 2 gm iv q12hrs per renal dosing protocol for severe infection.   Pharmacist's Name: Suyapa Ruiz  Pharmacist's Extension: [phone]

## 2024-03-06 NOTE — ADMISSIONCARE
AdmissionCare    Guideline: Pneumonia - INPT, Inpatient    Based on the indications selected for the patient, the bed status of Inpatient was determined to be MET    The following indications were selected as present at the time of evaluation of the patient:      - Hypoxemia, as indicated by 1 or more of the following:    - Patient without baseline need for supplemental oxygen with oxygen saturation (SaO2) of less than 90% or arterial blood gas partial pressure of oxygen (PO2) of less than 60 mm Hg (8.0 kPa) on room air    AdmissionCare documentation entered by: Marcelina Dobbs    [facility], 27th edition, Copyright © 2023 [facility] [place], [facility] All Rights Reserved.  [id number]:17:37-06:00

## 2024-03-06 NOTE — PROGRESS NOTES
Pharmacist Renal Dose Adjustment Note    Troy Chau is a 88 y.o. male being treated with the medication lovenox    Patient Data:    Vital Signs (Most Recent):  Temp: 98.6 °F (37 °C) (03/06/24 1107)  Pulse: 79 (03/06/24 1435)  Resp: 19 (03/06/24 1435)  BP: (!) 136/58 (03/06/24 1435)  SpO2: 97 % (03/06/24 1438) Vital Signs (72h Range):  Temp:  [98.6 °F (37 °C)]   Pulse:  [70-81]   Resp:  [18-33]   BP: (117-136)/(56-75)   SpO2:  [93 %-98 %]      Recent Labs   Lab 03/06/24  1154   CREATININE 1.1     Serum creatinine: 1.1 mg/dL 03/06/24 1154  Estimated creatinine clearance: 51.5 mL/min    Medication:lovenox 40 mg sq q24hrs will be changed to medication:lovenox 40 mg sq q12hrs per renal dosing protocol and BMI >=40.   Pharmacist's Name: Suyapa Ruiz [facility]  Pharmacist's Extension: [phone]

## 2024-03-06 NOTE — ASSESSMENT & PLAN NOTE
Chronic, controlled. Latest blood pressure and vitals reviewed-     Temp:  [98.6 °F (37 °C)]   Pulse:  [70-81]   Resp:  [18-33]   BP: (117-136)/(56-75)   SpO2:  [93 %-98 %] .   Home meds for hypertension were reviewed and noted below.   Hypertension Medications               amLODIPine (NORVASC) 5 MG tablet Take 5 mg by mouth once daily.    furosemide (LASIX) 40 MG tablet Take 40 mg by mouth once daily.            While in the hospital, will manage blood pressure as follows; Continue home antihypertensive regimen    Will utilize p.r.n. blood pressure medication only if patient's blood pressure greater than 160/100 and he develops symptoms such as worsening chest pain or shortness of breath.

## 2024-03-06 NOTE — ASSESSMENT & PLAN NOTE
"This patient does have evidence of infective focus  My overall impression is sepsis.  Source: Respiratory  Antibiotics given-   Antibiotics (72h ago, onward)      Start     Stop Route Frequency Ordered    03/06/24 1645  ceFEPIme (MAXIPIME) 1 g in dextrose 5 % in water (D5W) 100 mL IVPB (MB+)         -- IV Every 12 hours (non-standard times) 03/06/24 1531    03/06/24 1631  vancomycin - pharmacy to dose  (vancomycin IVPB (PEDS and ADULTS))        See Hyperspace for full Linked Orders Report.    -- IV pharmacy to manage frequency 03/06/24 1531          Latest lactate reviewed-  No results for input(s): "LACTATE", "POCLAC" in the last 72 hours.  Organ dysfunction indicated by Acute respiratory failure    Fluid challenge Not needed - patient is not hypotensive      Post- resuscitation assessment No - Post resuscitation assessment not needed       Will Not start Pressors- Levophed for MAP of 65  Source control achieved by: Vanc/Cefepime  "

## 2024-03-06 NOTE — ASSESSMENT & PLAN NOTE
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 Dementia is Unknown. The patient does not have signs of behavioral disturbance. Home dementia medications are Held or Continued: continued.. Continue non-pharmacologic interventions to prevent delirium (No VS between 11PM-5AM, activity during day, opening blinds, providing glasses/hearing aids, and up in chair during daytime). Will avoid narcotics and benzos unless absolutely necessary. PRN anti-psychotics are not prescribed to avoid self harm behaviors.

## 2024-03-06 NOTE — ASSESSMENT & PLAN NOTE
Patient's COPD is with exacerbation noted by continued dyspnea currently.  Patient is currently off COPD Pathway. Continue scheduled inhalers Antibiotics and Supplemental oxygen and monitor respiratory status closely.     --Mucinex  --Duoneb Q6H

## 2024-03-06 NOTE — ASSESSMENT & PLAN NOTE
"Patient's FSGs are controlled on current medication regimen.  Last A1c reviewed- No results found for: "LABA1C", "HGBA1C"  Most recent fingerstick glucose reviewed- No results for input(s): "POCTGLUCOSE" in the last 24 hours.  Current correctional scale  Low  Maintain anti-hyperglycemic dose as follows-   Antihyperglycemics (From admission, onward)      Start     Stop Route Frequency Ordered    03/06/24 1621  insulin aspart U-100 pen 0-5 Units         -- SubQ Before meals & nightly PRN 03/06/24 1522          Hold Oral hypoglycemics while patient is in the hospital.  "

## 2024-03-06 NOTE — ASSESSMENT & PLAN NOTE
Patient with Hypoxic Respiratory failure which is Acute.  he is not on home oxygen. Supplemental oxygen was provided and noted-      .   Signs/symptoms of respiratory failure include- tachypnea, increased work of breathing, and lethargy. Contributing diagnoses includes - Pneumonia Labs and images were reviewed. Patient Has recent ABG, which has been reviewed. Will treat underlying causes and adjust management of respiratory failure as follows-     --Vanc/Cefepime  --Nebs Q6H  --Supportive O2 to maintain SpO2 >92%

## 2024-03-06 NOTE — H&P
[facility]  [facility]  History & Physical    Patient Name: Troy Chau  MRN: [id number]  Patient Class: IP- Inpatient  Admission Date: 3/6/2024  Attending Physician: Bushra Velázquez MD   Primary Care Provider: Tanvir Petersen MD         Patient information was obtained from relative(s), past medical records, and ER records.     Subjective:     Principal Problem:Bilateral pneumonia    Chief Complaint:   Chief Complaint   Patient presents with    Shortness of Breath     Pt. Presents to Ed via [facility] due to feeling more short of breath over the past few days. Pt states a history of COPD, pt believes his is in fluid overload.         HPI: 88 y.o. male patient with a PMHx of COPD, HTN, Alzheimer's. Presented to the Emergency Department for SOB, onset several days PTA. Pt is not on home O2 and was noted to be hypoxic (SpO2 of 88% on room air), which improved to 96% on 2L NC. Symptoms are constant and moderate in severity. No mitigating or exacerbating factors reported. No associated sxs reported. Patient denies any fever, chills, n/v/d, CP, weakness, numbness, dizziness, headache, and all other sxs at this time. In the ED, vitals: 122/59, 77, 18, 98.6F, 96% 2L NC. Significant Labs: WBC: 26.51, H/H: 10.3/32.2, Na: 131, Cl: 93, trop: 0.053, ABG: pH: 7.336, PO2: 57, PCO2: 52.3. CT chest: Focal consolidation in the right lung base with some small consolidation in the left lung base. EKG: NSR. Blood cultures collected. Treated with neb, supportive O2, antibiotics. Patient is a DNR, Living Will on file. Patient admitted to Hospital Medicine for management of Sepsis, Bilateral PNA, Acute Respiratory Failure.       Past Medical History:   Diagnosis Date    Arthritis     BPH (benign prostatic hyperplasia)     COPD (chronic obstructive pulmonary disease)     Depression     Hyperlipidemia     Hyperlipidemia     Hypertension     Smoker     2 pack a day       Past Surgical History:   Procedure Laterality  Date    APPENDECTOMY      APPENDECTOMY      CHOLECYSTECTOMY      COLONOSCOPY      EYE SURGERY         Review of patient's allergies indicates:  No Known Allergies    No current facility-administered medications on file prior to encounter.     Current Outpatient Medications on File Prior to Encounter   Medication Sig    acetaminophen (TYLENOL) suppository Place 650 mg rectally every 4 (four) hours as needed for Temperature greater than.    ALPRAZolam (XANAX) 0.25 MG tablet Take 0.25 mg by mouth 2 (two) times daily as needed for Anxiety.    amLODIPine (NORVASC) 5 MG tablet Take 5 mg by mouth once daily.    docusate sodium (COLACE) 100 MG capsule Take 100 mg by mouth 2 (two) times daily.    EScitalopram oxalate (LEXAPRO) 10 MG tablet Take 10 mg by mouth once daily.    furosemide (LASIX) 40 MG tablet Take 40 mg by mouth once daily.    hydrOXYzine pamoate (VISTARIL) 50 MG Cap Take 50 mg by mouth 3 (three) times daily.    meclizine (ANTIVERT) 12.5 mg tablet Take 12.5 mg by mouth 2 (two) times daily.    metFORMIN (GLUCOPHAGE) 1000 MG tablet Take 1,000 mg by mouth 2 (two) times daily with meals.    polyethylene glycol (GLYCOLAX) 17 gram PwPk Take by mouth.    potassium chloride (MICRO-K) 10 MEQ CpSR Take 10 mEq by mouth once daily.    senna (SENOKOT) 8.6 mg tablet Take 1 tablet by mouth once daily.    tamsulosin (FLOMAX) 0.4 mg Cap Take 0.4 mg by mouth once daily.    traZODone (DESYREL) 100 MG tablet Take 100 mg by mouth every evening.    [DISCONTINUED] albuterol-ipratropium (DUO-NEB) 2.5 mg-0.5 mg/3 mL nebulizer solution Take 3 mLs by nebulization every 4 (four) hours as needed.    diclofenac sodium (VOLTAREN) 1 % Gel Apply 2 g topically 3 (three) times daily.    loratadine (CLARITIN ORAL) Take 1 tablet by mouth daily as needed.    ondansetron (ZOFRAN) 4 MG tablet Take 4 mg by mouth every 8 (eight) hours as needed for Nausea.    [DISCONTINUED] albuterol (PROVENTIL) 2.5 mg /3 mL (0.083 %) nebulizer solution Take 2.5 mg by  nebulization every 6 (six) hours as needed for Wheezing. Rescue    [DISCONTINUED] aspirin (ECOTRIN) 81 MG EC tablet Take 81 mg by mouth once daily.    [DISCONTINUED] atorvastatin (LIPITOR) 10 MG tablet Take 10 mg by mouth once daily.    [DISCONTINUED] buPROPion (WELLBUTRIN XL) 300 MG 24 hr tablet Take 300 mg by mouth once daily.    [DISCONTINUED] meclizine (ANTIVERT) 25 mg tablet Take 1 tablet (25 mg total) by mouth 3 (three) times daily as needed for Dizziness. (Patient taking differently: Take 12.5 mg by mouth 2 (two) times a day.)    [DISCONTINUED] potassium chloride (KLOR-CON) 10 MEQ TbSR Take 10 mEq by mouth once.     Family History       Problem Relation (Age of Onset)    Diabetes Mother    Heart disease Father          Tobacco Use    Smoking status: Every Day     Current packs/day: 2.00     Average packs/day: 2.0 packs/day for 60.0 years (120.0 ttl pk-yrs)     Types: Cigarettes    Smokeless tobacco: Never   Substance and Sexual Activity    Alcohol use: Yes     Comment: daily bourbon    Drug use: No    Sexual activity: Not on file     Review of Systems   Reason unable to perform ROS: Limited secondary to dementia.   Constitutional:  Positive for fatigue.   Respiratory:  Positive for cough and shortness of breath.    All other systems reviewed and are negative.    Objective:     Vital Signs (Most Recent):  Temp: 98.6 °F (37 °C) (03/06/24 1107)  Pulse: 79 (03/06/24 1435)  Resp: 19 (03/06/24 1435)  BP: (!) 136/58 (03/06/24 1435)  SpO2: 97 % (03/06/24 1438) Vital Signs (24h Range):  Temp:  [98.6 °F (37 °C)] 98.6 °F (37 °C)  Pulse:  [70-81] 79  Resp:  [18-33] 19  SpO2:  [93 %-98 %] 97 %  BP: (117-136)/(56-75) 136/58     Weight: 107.3 kg (236 lb 8.9 oz)  Body mass index is 40.6 kg/m².     Physical Exam  Vitals and nursing note reviewed.   Constitutional:       General: He is not in acute distress.     Appearance: He is overweight. He is ill-appearing. He is not diaphoretic.   HENT:      Head: Normocephalic and  atraumatic.      Right Ear: Hearing and external ear normal.      Left Ear: Hearing and external ear normal.      Nose: Nose normal. No mucosal edema or rhinorrhea.      Mouth/Throat:      Pharynx: Uvula midline.   Eyes:      General:         Right eye: No discharge.         Left eye: No discharge.      Conjunctiva/sclera: Conjunctivae normal.      Right eye: No chemosis.     Left eye: No chemosis.     Pupils: Pupils are equal, round, and reactive to light.   Neck:      Thyroid: No thyroid mass or thyromegaly.      Trachea: Trachea normal.   Cardiovascular:      Rate and Rhythm: Normal rate and regular rhythm.      Pulses:           Dorsalis pedis pulses are 2+ on the right side and 2+ on the left side.      Heart sounds: Normal heart sounds. No murmur heard.  Pulmonary:      Effort: Pulmonary effort is normal. Tachypnea present. No respiratory distress.      Breath sounds: Examination of the right-lower field reveals decreased breath sounds. Examination of the left-lower field reveals decreased breath sounds. Decreased breath sounds present. No wheezing.   Abdominal:      General: Bowel sounds are decreased. There is distension.      Palpations: Abdomen is soft.      Tenderness: There is no abdominal tenderness.   Musculoskeletal:         General: Normal range of motion.      Cervical back: Normal range of motion and neck supple.   Lymphadenopathy:      Cervical: No cervical adenopathy.      Upper Body:      Right upper body: No supraclavicular adenopathy.      Left upper body: No supraclavicular adenopathy.   Skin:     General: Skin is warm and dry.      Capillary Refill: Capillary refill takes less than 2 seconds.      Coloration: Skin is pale.      Findings: No rash.   Neurological:      Mental Status: He is alert. Mental status is at baseline.   Psychiatric:         Mood and Affect: Mood is not anxious.         Speech: Speech normal.         Behavior: Behavior normal.         Thought Content: Thought content  "normal.         Judgment: Judgment normal.              CRANIAL NERVES     CN III, IV, VI   Pupils are equal, round, and reactive to light.       Significant Labs: All pertinent labs within the past 24 hours have been reviewed.  CBC:   Recent Labs   Lab 03/06/24  1154   WBC 26.51*   HGB 10.3*   HCT 32.2*        CMP:   Recent Labs   Lab 03/06/24  1154   *   K 4.7   CL 93*   CO2 26   *   BUN 15   CREATININE 1.1   CALCIUM 8.9   PROT 6.9   ALBUMIN 3.3*   BILITOT 0.4   ALKPHOS 84   AST 15   ALT 12   ANIONGAP 12     Cardiac Markers:   Recent Labs   Lab 03/06/24  1154   BNP 43     Troponin:   Recent Labs   Lab 03/06/24  1154   TROPONINI 0.053*       Significant Imaging: I have reviewed all pertinent imaging results/findings within the past 24 hours.  Assessment/Plan:     * Bilateral pneumonia  Reported hypoxia, SOB at NH, per [facility] 88%, placed on 2L NC. CT chest: Focal consolidation in the right lung base with some small consolidation in the left lung base.     --Supportive O2 to maintain SpO2 >92%  --labs: blood cultures, Lactic Acid, Procal  --Sputum Culture  --Vanc/Cefepime      Sepsis  This patient does have evidence of infective focus  My overall impression is sepsis.  Source: Respiratory  Antibiotics given-   Antibiotics (72h ago, onward)      Start     Stop Route Frequency Ordered    03/06/24 1645  ceFEPIme (MAXIPIME) 1 g in dextrose 5 % in water (D5W) 100 mL IVPB (MB+)         -- IV Every 12 hours (non-standard times) 03/06/24 1531    03/06/24 1631  vancomycin - pharmacy to dose  (vancomycin IVPB (PEDS and ADULTS))        See Hyperspace for full Linked Orders Report.    -- IV pharmacy to manage frequency 03/06/24 1531          Latest lactate reviewed-  No results for input(s): "LACTATE", "POCLAC" in the last 72 hours.  Organ dysfunction indicated by Acute respiratory failure    Fluid challenge Not needed - patient is not hypotensive      Post- resuscitation assessment No - Post resuscitation " "assessment not needed       Will Not start Pressors- Levophed for MAP of 65  Source control achieved by: Vanc/Cefepime    Acute hypoxemic respiratory failure  Patient with Hypoxic Respiratory failure which is Acute.  he is not on home oxygen. Supplemental oxygen was provided and noted-      .   Signs/symptoms of respiratory failure include- tachypnea, increased work of breathing, and lethargy. Contributing diagnoses includes - Pneumonia Labs and images were reviewed. Patient Has recent ABG, which has been reviewed. Will treat underlying causes and adjust management of respiratory failure as follows-     --Vanc/Cefepime  --Nebs Q6H  --Supportive O2 to maintain SpO2 >92%    Type 2 diabetes mellitus with hyperglycemia  Patient's FSGs are controlled on current medication regimen.  Last A1c reviewed- No results found for: "LABA1C", "HGBA1C"  Most recent fingerstick glucose reviewed- No results for input(s): "POCTGLUCOSE" in the last 24 hours.  Current correctional scale  Low  Maintain anti-hyperglycemic dose as follows-   Antihyperglycemics (From admission, onward)      Start     Stop Route Frequency Ordered    03/06/24 1621  insulin aspart U-100 pen 0-5 Units         -- SubQ Before meals & nightly PRN 03/06/24 1522          Hold Oral hypoglycemics while patient is in the hospital.    Late onset Alzheimer's disease without behavioral disturbance  Patient with dementia with likely etiology of alzheimer's dementia. Dementia is Unknown. The patient does not have signs of behavioral disturbance. Home dementia medications are Held or Continued: continued.. Continue non-pharmacologic interventions to prevent delirium (No VS between 11PM-5AM, activity during day, opening blinds, providing glasses/hearing aids, and up in chair during daytime). Will avoid narcotics and benzos unless absolutely necessary. PRN anti-psychotics are not prescribed to avoid self harm behaviors.    Essential hypertension  Chronic, controlled. Latest " blood pressure and vitals reviewed-     Temp:  [98.6 °F (37 °C)]   Pulse:  [70-81]   Resp:  [18-33]   BP: (117-136)/(56-75)   SpO2:  [93 %-98 %] .   Home meds for hypertension were reviewed and noted below.   Hypertension Medications               amLODIPine (NORVASC) 5 MG tablet Take 5 mg by mouth once daily.    furosemide (LASIX) 40 MG tablet Take 40 mg by mouth once daily.            While in the hospital, will manage blood pressure as follows; Continue home antihypertensive regimen    Will utilize p.r.n. blood pressure medication only if patient's blood pressure greater than 160/100 and he develops symptoms such as worsening chest pain or shortness of breath.    COPD (chronic obstructive pulmonary disease)  Patient's COPD is with exacerbation noted by continued dyspnea currently.  Patient is currently off COPD Pathway. Continue scheduled inhalers Antibiotics and Supplemental oxygen and monitor respiratory status closely.     --Mucinex  --Duoneb Q6H      VTE Risk Mitigation (From admission, onward)           Ordered     enoxaparin injection 40 mg  Every 12 hours         03/06/24 1522     IP VTE HIGH RISK PATIENT  Once         03/06/24 1522     Place sequential compression device  Until discontinued         03/06/24 1522                               Pharmacist Renal Dose Adjustment Note    Troy Chau is a 88 y.o. male being treated with the medication cefepime    Patient Data:    Vital Signs (Most Recent):  Temp: 98.6 °F (37 °C) (03/06/24 1107)  Pulse: 79 (03/06/24 1435)  Resp: 19 (03/06/24 1435)  BP: (!) 136/58 (03/06/24 1435)  SpO2: 97 % (03/06/24 1438) Vital Signs (72h Range):  Temp:  [98.6 °F (37 °C)]   Pulse:  [70-81]   Resp:  [18-33]   BP: (117-136)/(56-75)   SpO2:  [93 %-98 %]      Recent Labs   Lab 03/06/24  1154   CREATININE 1.1     Serum creatinine: 1.1 mg/dL 03/06/24 1154  Estimated creatinine clearance: 51.5 mL/min    Medication:cefepime 1 gm iv q12hrs will be changed to medication:cefepime 2 gm  iv q12hrs per renal dosing protocol for severe infection.   Pharmacist's Name: Suyapa Ruiz  Pharmacist's Extension: [phone]        AdmissionCare    Guideline: Pneumonia - INPT, Inpatient    Based on the indications selected for the patient, the bed status of Inpatient was determined to be MET    The following indications were selected as present at the time of evaluation of the patient:      - Hypoxemia, as indicated by 1 or more of the following:    - Patient without baseline need for supplemental oxygen with oxygen saturation (SaO2) of less than 90% or arterial blood gas partial pressure of oxygen (PO2) of less than 60 mm Hg (8.0 kPa) on room air    AdmissionCare documentation entered by: Marcelina Dobbs    [facility] [employer], 27th edition, Copyright © 2023 [employer] All Rights Reserved.  [id number]    Marcelina Dobbs NP  Department of Hospital Medicine  '[place] - Emergency Dept.

## 2024-03-06 NOTE — HPI
88 y.o. male patient with a PMHx of COPD, HTN. Presented to the Emergency Department for SOB, onset several days PTA. Pt is not on home O2 and was noted to be hypoxic (SpO2 of 88% on room air), which improved to 96% on 2L NC. Symptoms are constant and moderate in severity. No mitigating or exacerbating factors reported. No associated sxs reported. Patient denies any fever, chills, n/v/d, CP, weakness, numbness, dizziness, headache, and all other sxs at this time. In the ED, vitals: 122/59, 77, 18, 98.6F, 96% 2L NC. Significant Labs: WBC: 26.51, H/H: 10.3/32.2, Na: 131, Cl: 93, trop: 0.053, ABG: pH: 7.336, PO2: 57, PCO2: 52.3. CT chest: Focal consolidation in the right lung base with some small consolidation in the left lung base. EKG: NSR. Blood cultures collected. Treated with neb, supportive O2, antibiotics. Patient is a DNR, Living Will on file. Patient admitted to Hospital Medicine for management of Sepsis, Bilateral PNA, Acute Respiratory Failure.

## 2024-03-06 NOTE — ED PROVIDER NOTES
SCRIBE #1 NOTE: I, Jas Gage, am scribing for, and in the presence of, Ant Haas MD. I have scribed the entire note.      History      Chief Complaint   Patient presents with    Shortness of Breath     Pt. Presents to Ed via [facility] due to feeling more short of breath over the past few days. Pt states a history of COPD, pt believes his is in fluid overload.        Review of patient's allergies indicates:  No Known Allergies     HPI   HPI    3/6/2024, 11:31 AM   History obtained from the patient      History of Present Illness: Troy Chau is a 88 y.o. male patient with a PMHx of COPD, HTN who presents to the Emergency Department for SOB, onset several days PTA. Pt is not on home O2 and was noted to be hypoxic (SpO2 of 88% on room air), which improved to 96% on 2L NC. Symptoms are constant and moderate in severity. No mitigating or exacerbating factors reported. No associated sxs reported. Patient denies any fever, chills, n/v/d, CP, weakness, numbness, dizziness, headache, and all other sxs at this time. No further complaints or concerns at this time.     Arrival mode: [facility]    PCP: Tanvir Petersen MD       Past Medical History:  Past Medical History:   Diagnosis Date    Arthritis     BPH (benign prostatic hyperplasia)     COPD (chronic obstructive pulmonary disease)     Depression     Hyperlipidemia     Hyperlipidemia     Hypertension     Smoker     2 pack a day       Past Surgical History:  Past Surgical History:   Procedure Laterality Date    APPENDECTOMY      APPENDECTOMY      CHOLECYSTECTOMY      COLONOSCOPY      EYE SURGERY           Family History:  Family History   Problem Relation Age of Onset    Diabetes Mother     Heart disease Father        Social History:  Social History     Tobacco Use    Smoking status: Every Day     Current packs/day: 2.00     Average packs/day: 2.0 packs/day for 60.0 years (120.0 ttl pk-yrs)     Types: Cigarettes    Smokeless tobacco: Never   Substance and Sexual  Activity    Alcohol use: Yes     Comment: daily bourbon    Drug use: No    Sexual activity: Not on file       ROS   Review of Systems   Constitutional:  Negative for chills and fever.   HENT:  Negative for sore throat.    Respiratory:  Positive for shortness of breath.    Cardiovascular:  Negative for chest pain.   Gastrointestinal:  Negative for diarrhea, nausea and vomiting.   Genitourinary:  Negative for dysuria.   Musculoskeletal:  Negative for back pain.   Skin:  Negative for rash.   Neurological:  Negative for dizziness, weakness, numbness and headaches.   Hematological:  Does not bruise/bleed easily.   All other systems reviewed and are negative.    Physical Exam      Initial Vitals [03/06/24 1107]   BP Pulse Resp Temp SpO2   (!) 122/59 77 18 98.6 °F (37 °C) 96 %      MAP       --          Physical Exam  Nursing Notes and Vital Signs Reviewed.  Constitutional: Patient is in no acute distress. Well-developed and well-nourished.  Head: Atraumatic. Normocephalic.  Eyes: PERRL. EOM intact. Conjunctivae are not pale. No scleral icterus.  ENT: Mucous membranes are moist. Oropharynx is clear and symmetric.    Neck: Supple. Full ROM.   Cardiovascular: Regular rate. Regular rhythm. No murmurs, rubs, or gallops. Distal pulses are 2+ and symmetric.  Pulmonary/Chest: No respiratory distress. Clear to auscultation bilaterally. No wheezing or rales.  Abdominal: Soft and non-distended.  There is no tenderness.  No rebound, guarding, or rigidity.   Musculoskeletal: Moves all extremities. No obvious deformities. No edema.  Skin: Warm and dry.  Neurological:  Alert, awake, and appropriate.  Normal speech.  No acute focal neurological deficits are appreciated.  Psychiatric: Normal affect. Good eye contact. Appropriate in content.    ED Course    Procedures  ED Vital Signs:  Vitals:    03/06/24 1107 03/06/24 1146 03/06/24 1147 03/06/24 1148   BP: (!) 122/59  (!) 117/56    Pulse: 77 72  70   Resp: 18   19   Temp: 98.6 °F (37 °C)       TempSrc: Oral      SpO2: 96%   98%   Weight:        03/06/24 1202 03/06/24 1302 03/06/24 1321 03/06/24 1435   BP: 124/60 131/75  (!) 136/58   Pulse: 71 81  79   Resp: (!) 33 20  19   Temp:       TempSrc:       SpO2: 98% (!) 94%  (!) 93%   Weight:   107.3 kg (236 lb 8.9 oz)     03/06/24 1438   BP:    Pulse:    Resp:    Temp:    TempSrc:    SpO2: 97%   Weight:        Abnormal Lab Results:  Labs Reviewed   CBC W/ AUTO DIFFERENTIAL - Abnormal; Notable for the following components:       Result Value    WBC 26.51 (*)     RBC 3.70 (*)     Hemoglobin 10.3 (*)     Hematocrit 32.2 (*)     MPV 8.6 (*)     Immature Granulocytes 0.8 (*)     Gran # (ANC) 24.5 (*)     Immature Grans (Abs) 0.20 (*)     Lymph # 0.5 (*)     Mono # 1.2 (*)     Gran % 92.4 (*)     Lymph % 1.9 (*)     All other components within normal limits   COMPREHENSIVE METABOLIC PANEL - Abnormal; Notable for the following components:    Sodium 131 (*)     Chloride 93 (*)     Glucose 251 (*)     Albumin 3.3 (*)     All other components within normal limits   URINALYSIS, REFLEX TO URINE CULTURE - Abnormal; Notable for the following components:    Protein, UA 1+ (*)     Glucose, UA 1+ (*)     All other components within normal limits    Narrative:     Specimen Source->Urine   TROPONIN I - Abnormal; Notable for the following components:    Troponin I 0.053 (*)     All other components within normal limits   URINALYSIS MICROSCOPIC - Abnormal; Notable for the following components:    Hyaline Casts, UA 18 (*)     All other components within normal limits    Narrative:     Specimen Source->Urine   ISTAT PROCEDURE - Abnormal; Notable for the following components:    POC PH 7.336 (*)     POC PCO2 52.3 (*)     POC PO2 57 (*)     All other components within normal limits   INFLUENZA A & B BY MOLECULAR   CULTURE, BLOOD   CULTURE, BLOOD   CULTURE, BLOOD   CULTURE, BLOOD   SARS-COV-2 RNA AMPLIFICATION, QUAL   B-TYPE NATRIURETIC PEPTIDE   CK   LACTIC ACID, PLASMA    PROCALCITONIN        All Lab Results:  Results for orders placed or performed during the hospital encounter of 03/06/24   Influenza A & B by Molecular    Specimen: Nasopharyngeal Swab   Result Value Ref Range    Influenza A, Molecular Negative Negative    Influenza B, Molecular Negative Negative    Flu A & B Source Nasal swab    CBC Auto Differential   Result Value Ref Range    WBC 26.51 (H) 3.90 - 12.70 K/uL    RBC 3.70 (L) 4.60 - 6.20 M/uL    Hemoglobin 10.3 (L) 14.0 - 18.0 g/dL    Hematocrit 32.2 (L) 40.0 - 54.0 %    MCV 87 82 - 98 fL    MCH 27.8 27.0 - 31.0 pg    MCHC 32.0 32.0 - 36.0 g/dL    RDW 14.5 11.5 - 14.5 %    Platelets 284 150 - 450 K/uL    MPV 8.6 (L) 9.2 - 12.9 fL    Immature Granulocytes 0.8 (H) 0.0 - 0.5 %    Gran # (ANC) 24.5 (H) 1.8 - 7.7 K/uL    Immature Grans (Abs) 0.20 (H) 0.00 - 0.04 K/uL    Lymph # 0.5 (L) 1.0 - 4.8 K/uL    Mono # 1.2 (H) 0.3 - 1.0 K/uL    Eos # 0.1 0.0 - 0.5 K/uL    Baso # 0.03 0.00 - 0.20 K/uL    nRBC 0 0 /100 WBC    Gran % 92.4 (H) 38.0 - 73.0 %    Lymph % 1.9 (L) 18.0 - 48.0 %    Mono % 4.5 4.0 - 15.0 %    Eosinophil % 0.3 0.0 - 8.0 %    Basophil % 0.1 0.0 - 1.9 %    Platelet Estimate Appears normal     Aniso Slight     Tear Drop Cells Occasional     Schistocytes Present     Differential Method Automated    Comprehensive Metabolic Panel   Result Value Ref Range    Sodium 131 (L) 136 - 145 mmol/L    Potassium 4.7 3.5 - 5.1 mmol/L    Chloride 93 (L) 95 - 110 mmol/L    CO2 26 23 - 29 mmol/L    Glucose 251 (H) 70 - 110 mg/dL    BUN 15 8 - 23 mg/dL    Creatinine 1.1 0.5 - 1.4 mg/dL    Calcium 8.9 8.7 - 10.5 mg/dL    Total Protein 6.9 6.0 - 8.4 g/dL    Albumin 3.3 (L) 3.5 - 5.2 g/dL    Total Bilirubin 0.4 0.1 - 1.0 mg/dL    Alkaline Phosphatase 84 55 - 135 U/L    AST 15 10 - 40 U/L    ALT 12 10 - 44 U/L    eGFR >60 >60 mL/min/1.73 m^2    Anion Gap 12 8 - 16 mmol/L   Urinalysis, Reflex to Urine Culture Urine, Clean Catch    Specimen: Urine   Result Value Ref Range    Specimen  UA Urine, Clean Catch     Color, UA Yellow Yellow, Straw, Nohemi    Appearance, UA Clear Clear    pH, UA 6.0 5.0 - 8.0    Specific Gravity, UA 1.020 1.005 - 1.030    Protein, UA 1+ (A) Negative    Glucose, UA 1+ (A) Negative    Ketones, UA Negative Negative    Bilirubin (UA) Negative Negative    Occult Blood UA Negative Negative    Nitrite, UA Negative Negative    Urobilinogen, UA Negative <2.0 EU/dL    Leukocytes, UA Negative Negative   COVID-19 Rapid Screening   Result Value Ref Range    SARS-CoV-2 RNA, Amplification, Qual Negative Negative   BNP   Result Value Ref Range    BNP 43 0 - 99 pg/mL   CK   Result Value Ref Range    CPK 32 20 - 200 U/L   Troponin I   Result Value Ref Range    Troponin I 0.053 (H) 0.000 - 0.026 ng/mL   Urinalysis Microscopic   Result Value Ref Range    RBC, UA 2 0 - 4 /hpf    WBC, UA 0 0 - 5 /hpf    Bacteria None None-Occ /hpf    Hyaline Casts, UA 18 (A) 0-1/lpf /lpf    Microscopic Comment SEE COMMENT    EKG 12-lead   Result Value Ref Range    QRS Duration 110 ms    [facility] QTC Calculation 434 ms   ISTAT PROCEDURE   Result Value Ref Range    POC PH 7.336 (L) 7.35 - 7.45    POC PCO2 52.3 (H) 35 - 45 mmHg    POC PO2 57 (LL) 80 - 100 mmHg    POC HCO3 28.0 24 - 28 mmol/L    POC BE 2 -2 to 2 mmol/L    POC SATURATED O2 87 95 - 100 %    Sample ARTERIAL     Site RR     Allens Test Pass     DelSys Room Air     Mode SPONT     [id number] 95      Imaging Results:  Imaging Results              CTA Chest Non-Coronary (PE Studies) (Final result)  Result time 03/06/24 14:32:01      Final result by Arash Garcia MD (03/06/24 14:32:01)                   Impression:      1. Slightly limited exam without large or central pulmonary embolism.  2. Focal consolidation in the right lung base with some small consolidation in the left lung base. Follow-up chest CT in 3 months is recommended to assure resolution of the pulmonary opacities.  3. Enlarged mediastinal lymph nodes are likely secondary to the acute infectious  process.      Electronically signed by: Arash Broussard  Date:    03/06/2024  Time:    14:32               Narrative:    EXAMINATION:  CTA CHEST NON CORONARY (PE STUDIES)    CLINICAL HISTORY:  PE suspected, intermediate prob, neg D-dimer;, chest pain    COMPARISON:  None available.    TECHNIQUE:  Axial CT images were obtained of the chest.  Iterative reconstruction technique was used. The CT exam was performed using one or more of the following dose reduction techniques- Automated exposure control, adjustment of the mA and/or kV according to patient size, and/or use of iterative reconstructed technique.  Low dose axial images were obtained, sagittal and coronal reformations were created.  100 mL Omnipaque 350 IV contrast was administered.  Contrast timing was optimized to evaluate the vascular structures.  MIP images were performed.    FINDINGS:  Coronary artery calcification: Present.    Pulmonary Arteries: No large or central pulmonary embolism.  Evaluation of the pulmonary arteries in the lung bases is limited secondary to motion artifact.    Aorta: Atherosclerosis. No Aneurysm.    Lungs: Bibasilar consolidations and atelectasis more pronounced in the right lung than the left    Pleural space:No pleural effusion or pneumothorax.    Heart: Cardiomegaly.  No pericardial effusion.    Bones/joints: No acute finding.    Other: Prominent right paratracheal lymph node measures a bowel 1.7 x 1.6 cm                                       X-Ray Chest AP Portable (Final result)  Result time 03/06/24 11:57:23      Final result by Troy Landis MD (03/06/24 11:57:23)                   Impression:      No acute findings.      Electronically signed by: Troy Landis MD  Date:    03/06/2024  Time:    11:57               Narrative:    EXAMINATION:  XR CHEST AP PORTABLE    CLINICAL HISTORY:  sob;    TECHNIQUE:  Single frontal view of the chest was performed.    COMPARISON:  07/20/2019    FINDINGS:  Stable mild cardiomegaly.   Aortic atherosclerosis.    Shallow inspiration.  Lungs are clear.  Skin fold versus smooth air pleural thickening over the lateral right lung base appears unchanged.    No acute osseous findings.  No advanced arthritic changes.                                     The EKG was ordered, reviewed, and independently interpreted by the ED provider.  Interpretation time: 12:29  Rate: 77 BPM  Rhythm: normal sinus rhythm  Interpretation: Left axis deviation. LVH. No STEMI.           The Emergency Provider reviewed the vital signs and test results, which are outlined above.    ED Discussion     3:21 PM: Discussed case with Dr. Velázquez ([facility]). Dr. Velázquez agrees with current care and management of pt and accepts admission.   Admitting Service: [facility]  Admitting Physician: Dr. Velázquez  Admit to: Inpatient Tele    3:22 PM: Re-evaluated pt. I have discussed test results, shared treatment plan, and the need for admission with patient and family at bedside. Pt and family express understanding at this time and agree with all information. All questions answered. Pt and family have no further questions or concerns at this time. Pt is ready for admit.         ED Medication(s):  Medications   cefTRIAXone (Rocephin) 1 g in dextrose 5 % in water (D5W) 100 mL IVPB (MB+) (has no administration in time range)   azithromycin (ZITHROMAX) 500 mg in dextrose 5 % (D5W) 250 mL IVPB (Vial-Mate) (has no administration in time range)   sodium chloride 3% nebulizer solution 4 mL (has no administration in time range)   sodium chloride 0.9% flush 10 mL (has no administration in time range)   melatonin tablet 6 mg (has no administration in time range)   ondansetron injection 4 mg (has no administration in time range)   prochlorperazine injection Soln 5 mg (has no administration in time range)   polyethylene glycol packet 17 g (has no administration in time range)   aluminum-magnesium hydroxide-simethicone 200-200-20 mg/5 mL  suspension 30 mL (has no administration in time range)   acetaminophen tablet 650 mg (has no administration in time range)   naloxone 0.4 mg/mL injection 0.02 mg (has no administration in time range)   glucose chewable tablet 16 g (has no administration in time range)   glucose chewable tablet 24 g (has no administration in time range)   glucagon (human recombinant) injection 1 mg (has no administration in time range)   enoxaparin injection 40 mg (has no administration in time range)   insulin aspart U-100 pen 0-5 Units (has no administration in time range)   dextrose 10% bolus 125 mL 125 mL (has no administration in time range)   dextrose 10% bolus 250 mL 250 mL (has no administration in time range)   albuterol-ipratropium 2.5 mg-0.5 mg/3 mL nebulizer solution 3 mL (3 mLs Nebulization Given 3/6/24 1202)   iohexoL (OMNIPAQUE 350) injection 100 mL (100 mLs Intravenous Given 3/6/24 1422)         New Prescriptions    No medications on file         Medical Decision Making    Medical Decision Making  Presents with sob and weakness.  Hx of copd  DDx: COPD, pneumonia, sob    Amount and/or Complexity of Data Reviewed  Labs: ordered. Decision-making details documented in ED Course.  Radiology: ordered. Decision-making details documented in ED Course.  ECG/medicine tests: ordered and independent interpretation performed. Decision-making details documented in ED Course.    Risk  Prescription drug management.  Decision regarding hospitalization.                Scribe Attestation:   Scribe #1: I performed the above scribed service and the documentation accurately describes the services I performed. I attest to the accuracy of the note.    Attending:   Physician Attestation Statement for Scribe #1: I, Ant Haas MD, personally performed the services described in this documentation, as scribed by Jas Gage, in my presence, and it is both accurate and complete.          Clinical Impression       ICD-10-CM ICD-9-CM   1.  Hypoxia  R09.02 799.02   2. SOB (shortness of breath)  R06.02 786.05   3. Chest pain  R07.9 786.50   4. Pneumonia of both lower lobes due to infectious organism  J18.9 486       Disposition:   Disposition: Admitted  Condition: Ant Henderson MD  03/06/24 1525

## 2024-03-06 NOTE — H&P
[facility]  [facility]  [facility] & Physical    Patient Name: Troy Chau  MRN: [id number]  Patient Class: IP- Inpatient  Admission Date: 3/6/2024  Attending Physician: Bushra Velázquez MD   Primary Care Provider: Tanvir Petersen MD         Patient information was obtained from relative(s), past medical records, and ER records.     Subjective:     Principal Problem:Bilateral pneumonia    Chief Complaint:   Chief Complaint   Patient presents with    Shortness of Breath     Pt. Presents to Ed via [facility] due to feeling more short of breath over the past few days. Pt states a history of COPD, pt believes his is in fluid overload.         HPI: 88 y.o. male patient with a PMHx of COPD, HTN, Alzheimer's. Presented to the Emergency Department for SOB, onset several days PTA. Pt is not on home O2 and was noted to be hypoxic (SpO2 of 88% on room air), which improved to 96% on 2L NC. Symptoms are constant and moderate in severity. No mitigating or exacerbating factors reported. No associated sxs reported. Patient denies any fever, chills, n/v/d, CP, weakness, numbness, dizziness, headache, and all other sxs at this time. In the ED, vitals: 122/59, 77, 18, 98.6F, 96% 2L NC. Significant Labs: WBC: 26.51, H/H: 10.3/32.2, Na: 131, Cl: 93, trop: 0.053, ABG: pH: 7.336, PO2: 57, PCO2: 52.3. CT chest: Focal consolidation in the right lung base with some small consolidation in the left lung base. EKG: NSR. Blood cultures collected. Treated with neb, supportive O2, antibiotics. Patient is a DNR, Living Will on file. Patient admitted to Hospital Medicine for management of Sepsis, Bilateral PNA, Acute Respiratory Failure.       No new subjective & objective note has been filed under this hospital service since the last note was generated.    Assessment/Plan:     * Bilateral pneumonia  Reported hypoxia, SOB at NH, per [facility] 88%, placed on 2L NC. CT chest: Focal consolidation in the right lung base with some small  "consolidation in the left lung base.     --Supportive O2 to maintain SpO2 >92%  --labs: blood cultures, Lactic Acid, Procal  --Sputum Culture  --Vanc/Cefepime      Sepsis  This patient does have evidence of infective focus  My overall impression is sepsis.  Source: Respiratory  Antibiotics given-   Antibiotics (72h ago, onward)      Start     Stop Route Frequency Ordered    03/06/24 1645  ceFEPIme (MAXIPIME) 1 g in dextrose 5 % in water (D5W) 100 mL IVPB (MB+)         -- IV Every 12 hours (non-standard times) 03/06/24 1531    03/06/24 1631  vancomycin - pharmacy to dose  (vancomycin IVPB (PEDS and ADULTS))        See Hyperspace for full Linked Orders Report.    -- IV pharmacy to manage frequency 03/06/24 1531          Latest lactate reviewed-  No results for input(s): "LACTATE", "POCLAC" in the last 72 hours.  Organ dysfunction indicated by Acute respiratory failure    Fluid challenge Not needed - patient is not hypotensive      Post- resuscitation assessment No - Post resuscitation assessment not needed       Will Not start Pressors- Levophed for MAP of 65  Source control achieved by: Vanc/Cefepime    Acute hypoxemic respiratory failure  Patient with Hypoxic Respiratory failure which is Acute.  he is not on home oxygen. Supplemental oxygen was provided and noted-      .   Signs/symptoms of respiratory failure include- tachypnea, increased work of breathing, and lethargy. Contributing diagnoses includes - Pneumonia Labs and images were reviewed. Patient Has recent ABG, which has been reviewed. Will treat underlying causes and adjust management of respiratory failure as follows-     --Vanc/Cefepime  --Nebs Q6H  --Supportive O2 to maintain SpO2 >92%    Type 2 diabetes mellitus with hyperglycemia  Patient's FSGs are controlled on current medication regimen.  Last A1c reviewed- No results found for: "LABA1C", "HGBA1C"  Most recent fingerstick glucose reviewed- No results for input(s): "POCTGLUCOSE" in the last 24 " hours.  Current correctional scale  Low  Maintain anti-hyperglycemic dose as follows-   Antihyperglycemics (From admission, onward)      Start     Stop Route Frequency Ordered    03/06/24 1621  insulin aspart U-100 pen 0-5 Units         -- SubQ Before meals & nightly PRN 03/06/24 1522          Hold Oral hypoglycemics while patient is in the hospital.    Late onset Alzheimer's disease without behavioral disturbance  Patient with dementia with likely etiology of alzheimer's dementia. Dementia is Unknown. The patient does not have signs of behavioral disturbance. Home dementia medications are Held or Continued: continued.. Continue non-pharmacologic interventions to prevent delirium (No VS between 11PM-5AM, activity during day, opening blinds, providing glasses/hearing aids, and up in chair during daytime). Will avoid narcotics and benzos unless absolutely necessary. PRN anti-psychotics are not prescribed to avoid self harm behaviors.    Essential hypertension  Chronic, controlled. Latest blood pressure and vitals reviewed-     Temp:  [98.6 °F (37 °C)]   Pulse:  [70-81]   Resp:  [18-33]   BP: (117-136)/(56-75)   SpO2:  [93 %-98 %] .   Home meds for hypertension were reviewed and noted below.   Hypertension Medications               amLODIPine (NORVASC) 5 MG tablet Take 5 mg by mouth once daily.    furosemide (LASIX) 40 MG tablet Take 40 mg by mouth once daily.            While in the hospital, will manage blood pressure as follows; Continue home antihypertensive regimen    Will utilize p.r.n. blood pressure medication only if patient's blood pressure greater than 160/100 and he develops symptoms such as worsening chest pain or shortness of breath.    COPD (chronic obstructive pulmonary disease)  Patient's COPD is with exacerbation noted by continued dyspnea currently.  Patient is currently off COPD Pathway. Continue scheduled inhalers Antibiotics and Supplemental oxygen and monitor respiratory status closely.      --Mucinex  --Duoneb Q6H      VTE Risk Mitigation (From admission, onward)           Ordered     enoxaparin injection 40 mg  Every 12 hours         03/06/24 1522     IP VTE HIGH RISK PATIENT  Once         03/06/24 1522     Place sequential compression device  Until discontinued         03/06/24 1522                               Pharmacist Renal Dose Adjustment Note    Troy Chau is a 88 y.o. male being treated with the medication cefepime    Patient Data:    Vital Signs (Most Recent):  Temp: 98.6 °F (37 °C) (03/06/24 1107)  Pulse: 79 (03/06/24 1435)  Resp: 19 (03/06/24 1435)  BP: (!) 136/58 (03/06/24 1435)  SpO2: 97 % (03/06/24 1438) Vital Signs (72h Range):  Temp:  [98.6 °F (37 °C)]   Pulse:  [70-81]   Resp:  [18-33]   BP: (117-136)/(56-75)   SpO2:  [93 %-98 %]      Recent Labs   Lab 03/06/24  1154   CREATININE 1.1     Serum creatinine: 1.1 mg/dL 03/06/24 1154  Estimated creatinine clearance: 51.5 mL/min    Medication:cefepime 1 gm iv q12hrs will be changed to medication:cefepime 2 gm iv q12hrs per renal dosing protocol for severe infection.   Pharmacist's Name: Suyapa Ruiz  Pharmacist's Extension: [phone]        AdmissionCare    Guideline: Pneumonia - INPT, Inpatient    Based on the indications selected for the patient, the bed status of Inpatient was determined to be MET    The following indications were selected as present at the time of evaluation of the patient:      - Hypoxemia, as indicated by 1 or more of the following:    - Patient without baseline need for supplemental oxygen with oxygen saturation (SaO2) of less than 90% or arterial blood gas partial pressure of oxygen (PO2) of less than 60 mm Hg (8.0 kPa) on room air    AdmissionCare documentation entered by: Marcelina Dobbs    [facility] [employer], 27th edition, Copyright © 2023 [facility] [employer], [employer] All Rights Reserved.  [id number]:17:37-06:00    Marcelina Dobbs NP  Department of Hospital Medicine  [facility] - Emergency Dept.

## 2024-03-07 PROBLEM — R13.10 DYSPHAGIA: Status: ACTIVE | Noted: 2024-03-07

## 2024-03-07 LAB
ALBUMIN SERPL BCP-MCNC: 3.2 G/DL (ref 3.5–5.2)
ALP SERPL-CCNC: 89 U/L (ref 55–135)
ALT SERPL W/O P-5'-P-CCNC: 12 U/L (ref 10–44)
ANION GAP SERPL CALC-SCNC: 9 MMOL/L (ref 8–16)
AST SERPL-CCNC: 14 U/L (ref 10–40)
BASOPHILS # BLD AUTO: 0.05 K/UL (ref 0–0.2)
BASOPHILS NFR BLD: 0.3 % (ref 0–1.9)
BILIRUB SERPL-MCNC: 0.5 MG/DL (ref 0.1–1)
BUN SERPL-MCNC: 16 MG/DL (ref 8–23)
CALCIUM SERPL-MCNC: 8.4 MG/DL (ref 8.7–10.5)
CHLORIDE SERPL-SCNC: 94 MMOL/L (ref 95–110)
CO2 SERPL-SCNC: 28 MMOL/L (ref 23–29)
CREAT SERPL-MCNC: 0.9 MG/DL (ref 0.5–1.4)
DIFFERENTIAL METHOD BLD: ABNORMAL
EOSINOPHIL # BLD AUTO: 0.2 K/UL (ref 0–0.5)
EOSINOPHIL NFR BLD: 1 % (ref 0–8)
ERYTHROCYTE [DISTWIDTH] IN BLOOD BY AUTOMATED COUNT: 14.6 % (ref 11.5–14.5)
EST. GFR  (NO RACE VARIABLE): >60 ML/MIN/1.73 M^2
GLUCOSE SERPL-MCNC: 130 MG/DL (ref 70–110)
HCT VFR BLD AUTO: 34.4 % (ref 40–54)
HGB BLD-MCNC: 10.9 G/DL (ref 14–18)
IMM GRANULOCYTES # BLD AUTO: 0.09 K/UL (ref 0–0.04)
IMM GRANULOCYTES NFR BLD AUTO: 0.5 % (ref 0–0.5)
LYMPHOCYTES # BLD AUTO: 1.1 K/UL (ref 1–4.8)
LYMPHOCYTES NFR BLD: 6.5 % (ref 18–48)
MAGNESIUM SERPL-MCNC: 1.9 MG/DL (ref 1.6–2.6)
MCH RBC QN AUTO: 27.9 PG (ref 27–31)
MCHC RBC AUTO-ENTMCNC: 31.7 G/DL (ref 32–36)
MCV RBC AUTO: 88 FL (ref 82–98)
MONOCYTES # BLD AUTO: 1.2 K/UL (ref 0.3–1)
MONOCYTES NFR BLD: 7.1 % (ref 4–15)
NEUTROPHILS # BLD AUTO: 13.9 K/UL (ref 1.8–7.7)
NEUTROPHILS NFR BLD: 84.6 % (ref 38–73)
NRBC BLD-RTO: 0 /100 WBC
PHOSPHATE SERPL-MCNC: 3.6 MG/DL (ref 2.7–4.5)
PLATELET # BLD AUTO: 281 K/UL (ref 150–450)
PMV BLD AUTO: 8.8 FL (ref 9.2–12.9)
POCT GLUCOSE: 152 MG/DL (ref 70–110)
POCT GLUCOSE: 163 MG/DL (ref 70–110)
POCT GLUCOSE: 213 MG/DL (ref 70–110)
POCT GLUCOSE: 215 MG/DL (ref 70–110)
POTASSIUM SERPL-SCNC: 4.9 MMOL/L (ref 3.5–5.1)
PROT SERPL-MCNC: 6.2 G/DL (ref 6–8.4)
RBC # BLD AUTO: 3.91 M/UL (ref 4.6–6.2)
SODIUM SERPL-SCNC: 131 MMOL/L (ref 136–145)
TROPONIN I SERPL DL<=0.01 NG/ML-MCNC: 0.05 NG/ML (ref 0–0.03)
VANCOMYCIN SERPL-MCNC: 12.1 UG/ML
VANCOMYCIN SERPL-MCNC: 18.5 UG/ML
WBC # BLD AUTO: 16.49 K/UL (ref 3.9–12.7)

## 2024-03-07 PROCEDURE — 80053 COMPREHEN METABOLIC PANEL: CPT | Performed by: NURSE PRACTITIONER

## 2024-03-07 PROCEDURE — 94640 AIRWAY INHALATION TREATMENT: CPT

## 2024-03-07 PROCEDURE — 63600175 PHARM REV CODE 636 W HCPCS: Performed by: NURSE PRACTITIONER

## 2024-03-07 PROCEDURE — 99900035 HC TECH TIME PER 15 MIN (STAT)

## 2024-03-07 PROCEDURE — 87205 SMEAR GRAM STAIN: CPT | Performed by: NURSE PRACTITIONER

## 2024-03-07 PROCEDURE — 36415 COLL VENOUS BLD VENIPUNCTURE: CPT | Performed by: INTERNAL MEDICINE

## 2024-03-07 PROCEDURE — 85025 COMPLETE CBC W/AUTO DIFF WBC: CPT | Performed by: NURSE PRACTITIONER

## 2024-03-07 PROCEDURE — 92610 EVALUATE SWALLOWING FUNCTION: CPT

## 2024-03-07 PROCEDURE — 92523 SPEECH SOUND LANG COMPREHEN: CPT

## 2024-03-07 PROCEDURE — 25000242 PHARM REV CODE 250 ALT 637 W/ HCPCS: Performed by: NURSE PRACTITIONER

## 2024-03-07 PROCEDURE — 84100 ASSAY OF PHOSPHORUS: CPT | Performed by: NURSE PRACTITIONER

## 2024-03-07 PROCEDURE — 27000221 HC OXYGEN, UP TO 24 HOURS

## 2024-03-07 PROCEDURE — 84484 ASSAY OF TROPONIN QUANT: CPT | Performed by: FAMILY MEDICINE

## 2024-03-07 PROCEDURE — 87070 CULTURE OTHR SPECIMN AEROBIC: CPT | Performed by: NURSE PRACTITIONER

## 2024-03-07 PROCEDURE — 21400001 HC TELEMETRY ROOM

## 2024-03-07 PROCEDURE — A9698 NON-RAD CONTRAST MATERIALNOC: HCPCS | Performed by: FAMILY MEDICINE

## 2024-03-07 PROCEDURE — 11000001 HC ACUTE MED/SURG PRIVATE ROOM

## 2024-03-07 PROCEDURE — 80202 ASSAY OF VANCOMYCIN: CPT | Performed by: INTERNAL MEDICINE

## 2024-03-07 PROCEDURE — 94761 N-INVAS EAR/PLS OXIMETRY MLT: CPT

## 2024-03-07 PROCEDURE — 83735 ASSAY OF MAGNESIUM: CPT | Performed by: NURSE PRACTITIONER

## 2024-03-07 PROCEDURE — 25500020 PHARM REV CODE 255: Performed by: FAMILY MEDICINE

## 2024-03-07 PROCEDURE — 36415 COLL VENOUS BLD VENIPUNCTURE: CPT | Mod: XB | Performed by: FAMILY MEDICINE

## 2024-03-07 PROCEDURE — 25000003 PHARM REV CODE 250: Performed by: NURSE PRACTITIONER

## 2024-03-07 PROCEDURE — 80202 ASSAY OF VANCOMYCIN: CPT | Mod: 91 | Performed by: FAMILY MEDICINE

## 2024-03-07 PROCEDURE — 25000003 PHARM REV CODE 250: Performed by: INTERNAL MEDICINE

## 2024-03-07 PROCEDURE — 63600175 PHARM REV CODE 636 W HCPCS: Performed by: FAMILY MEDICINE

## 2024-03-07 PROCEDURE — 92611 MOTION FLUOROSCOPY/SWALLOW: CPT

## 2024-03-07 PROCEDURE — 25000003 PHARM REV CODE 250: Performed by: FAMILY MEDICINE

## 2024-03-07 RX ADMIN — AMLODIPINE BESYLATE 5 MG: 5 TABLET ORAL at 09:03

## 2024-03-07 RX ADMIN — VANCOMYCIN HYDROCHLORIDE 1500 MG: 1.5 INJECTION, POWDER, LYOPHILIZED, FOR SOLUTION INTRAVENOUS at 09:03

## 2024-03-07 RX ADMIN — GUAIFENESIN 600 MG: 600 TABLET, EXTENDED RELEASE ORAL at 08:03

## 2024-03-07 RX ADMIN — CEFEPIME 2 G: 2 INJECTION, POWDER, FOR SOLUTION INTRAVENOUS at 09:03

## 2024-03-07 RX ADMIN — FUROSEMIDE 40 MG: 40 TABLET ORAL at 09:03

## 2024-03-07 RX ADMIN — ENOXAPARIN SODIUM 40 MG: 40 INJECTION SUBCUTANEOUS at 04:03

## 2024-03-07 RX ADMIN — ESCITALOPRAM OXALATE 10 MG: 10 TABLET ORAL at 09:03

## 2024-03-07 RX ADMIN — MECLIZINE 12.5 MG: 12.5 TABLET ORAL at 09:03

## 2024-03-07 RX ADMIN — GUAIFENESIN 600 MG: 600 TABLET, EXTENDED RELEASE ORAL at 09:03

## 2024-03-07 RX ADMIN — SENNOSIDES 1 TABLET: 8.6 TABLET, FILM COATED ORAL at 09:03

## 2024-03-07 RX ADMIN — TRAZODONE HYDROCHLORIDE 100 MG: 100 TABLET ORAL at 08:03

## 2024-03-07 RX ADMIN — MECLIZINE 12.5 MG: 12.5 TABLET ORAL at 08:03

## 2024-03-07 RX ADMIN — POLYETHYLENE GLYCOL 3350 17 G: 17 POWDER, FOR SOLUTION ORAL at 09:03

## 2024-03-07 RX ADMIN — INSULIN ASPART 2 UNITS: 100 INJECTION, SOLUTION INTRAVENOUS; SUBCUTANEOUS at 11:03

## 2024-03-07 RX ADMIN — POTASSIUM CHLORIDE 10 MEQ: 750 TABLET, FILM COATED, EXTENDED RELEASE ORAL at 09:03

## 2024-03-07 RX ADMIN — DOCUSATE SODIUM 100 MG: 100 CAPSULE, LIQUID FILLED ORAL at 09:03

## 2024-03-07 RX ADMIN — IPRATROPIUM BROMIDE AND ALBUTEROL SULFATE 3 ML: 2.5; .5 SOLUTION RESPIRATORY (INHALATION) at 01:03

## 2024-03-07 RX ADMIN — BARIUM SULFATE 60 ML: 0.81 POWDER, FOR SUSPENSION ORAL at 04:03

## 2024-03-07 RX ADMIN — TAMSULOSIN HYDROCHLORIDE 0.4 MG: 0.4 CAPSULE ORAL at 09:03

## 2024-03-07 RX ADMIN — BARIUM SULFATE 30 G: 0.6 CREAM ORAL at 04:03

## 2024-03-07 RX ADMIN — IPRATROPIUM BROMIDE AND ALBUTEROL SULFATE 3 ML: 2.5; .5 SOLUTION RESPIRATORY (INHALATION) at 06:03

## 2024-03-07 RX ADMIN — INSULIN ASPART 1 UNITS: 100 INJECTION, SOLUTION INTRAVENOUS; SUBCUTANEOUS at 08:03

## 2024-03-07 RX ADMIN — CEFEPIME 2 G: 2 INJECTION, POWDER, FOR SOLUTION INTRAVENOUS at 05:03

## 2024-03-07 RX ADMIN — IPRATROPIUM BROMIDE AND ALBUTEROL SULFATE 3 ML: 2.5; .5 SOLUTION RESPIRATORY (INHALATION) at 07:03

## 2024-03-07 RX ADMIN — DOCUSATE SODIUM 100 MG: 100 CAPSULE, LIQUID FILLED ORAL at 08:03

## 2024-03-07 NOTE — ASSESSMENT & PLAN NOTE
This patient does have evidence of infective focus  My overall impression is sepsis.  Source: Respiratory  Antibiotics given-   Antibiotics (72h ago, onward)      Start     Stop Route Frequency Ordered    03/07/24 1701  ceFEPIme (MAXIPIME) 2 g in dextrose 5 % in water (D5W) 100 mL IVPB (MB+)         -- IV Every 8 hours (non-standard times) 03/07/24 0955    03/06/24 1631  vancomycin - pharmacy to dose  (vancomycin IVPB (PEDS and ADULTS))        See Hyperspace for full Linked Orders Report.    -- IV pharmacy to manage frequency 03/06/24 1531          Latest lactate reviewed-  Recent Labs   Lab 03/06/24  1609   LACTATE 2.2     Organ dysfunction indicated by Acute respiratory failure    Fluid challenge Not needed - patient is not hypotensive      Post- resuscitation assessment No - Post resuscitation assessment not needed       Will Not start Pressors- Levophed for MAP of 65  Source control achieved by: Vanc/Cefepime    3/7/24  Leukocytosis improving, blood cultures are negative. Sputum culture pending

## 2024-03-07 NOTE — SUBJECTIVE & OBJECTIVE
Interval History:  Pt is lethargic. Currently on 2L of O2.      Review of Systems   Constitutional:  Negative for chills, diaphoresis, fatigue and fever.   HENT:  Negative for drooling, ear pain, rhinorrhea and sore throat.    Eyes: Negative.    Respiratory:  Positive for cough and wheezing. Negative for shortness of breath.    Cardiovascular:  Positive for leg swelling. Negative for chest pain and palpitations.   Gastrointestinal:  Negative for abdominal pain, constipation, diarrhea and nausea.   Endocrine: Negative.    Genitourinary:  Negative for dysuria, hematuria and urgency.   Musculoskeletal: Negative.    Skin:  Negative for color change and wound.   Allergic/Immunologic: Negative.    Neurological:  Negative for dizziness, syncope and speech difficulty.   Hematological: Negative.    Psychiatric/Behavioral: Negative.       Objective:     Vital Signs (Most Recent):  Temp: 99.3 °F (37.4 °C) (03/07/24 1126)  Pulse: 92 (03/07/24 1333)  Resp: 18 (03/07/24 1333)  BP: (!) 144/66 (03/07/24 1126)  SpO2: 95 % (03/07/24 1333) Vital Signs (24h Range):  Temp:  [98.1 °F (36.7 °C)-99.3 °F (37.4 °C)] 99.3 °F (37.4 °C)  Pulse:  [74-96] 92  Resp:  [16-20] 18  SpO2:  [90 %-97 %] 95 %  BP: (111-168)/(58-74) 144/66     Weight: 96.3 kg (212 lb 4.9 oz)  Body mass index is 36.44 kg/m².    Intake/Output Summary (Last 24 hours) at 3/7/2024 1547  Last data filed at 3/7/2024 0901  Gross per 24 hour   Intake 650 ml   Output --   Net 650 ml         Physical Exam  Vitals reviewed.   Constitutional:       Appearance: Normal appearance. He is obese.      Comments: Patient is somnolent, easily arousable. [place]     HENT:      Head: Normocephalic and atraumatic.   Cardiovascular:      Rate and Rhythm: Normal rate and regular rhythm.      Pulses: Normal pulses.      Heart sounds: Normal heart sounds.   Pulmonary:      Effort: Pulmonary effort is normal. No respiratory distress.      Breath sounds: Examination of the right-lower field reveals rales.  Examination of the left-lower field reveals rales. Rhonchi and rales present.      Comments: Weak/moist cough      Abdominal:      General: Bowel sounds are normal.      Palpations: Abdomen is soft.   Musculoskeletal:      Right lower leg: Edema (+1) present.      Left lower leg: Edema (+1) present.   Skin:     General: Skin is warm.      Capillary Refill: Capillary refill takes less than 2 seconds.      Comments: Skin changes consistent with venous stasis     Neurological:      Mental Status: He is oriented to person, place, and time. Mental status is at baseline.             Significant Labs: All pertinent labs within the past 24 hours have been reviewed.  CBC:   Recent Labs   Lab 03/06/24  1154 03/07/24  0459   WBC 26.51* 16.49*   HGB 10.3* 10.9*   HCT 32.2* 34.4*    281     CMP:   Recent Labs   Lab 03/06/24  1154 03/07/24  0459   * 131*   K 4.7 4.9   CL 93* 94*   CO2 26 28   * 130*   BUN 15 16   CREATININE 1.1 0.9   CALCIUM 8.9 8.4*   PROT 6.9 6.2   ALBUMIN 3.3* 3.2*   BILITOT 0.4 0.5   ALKPHOS 84 89   AST 15 14   ALT 12 12   ANIONGAP 12 9       Significant Imaging: I have reviewed all pertinent imaging results/findings within the past 24 hours.

## 2024-03-07 NOTE — ASSESSMENT & PLAN NOTE
0.053>0.048  Suspect demand ischemia from hypoxia and aspiration pneumonia. Downward trend  Check echocardiogram

## 2024-03-07 NOTE — PT/OT/SLP PROGRESS
"[facility]   Inpatient MODIFIED BARIUM SWALLOW STUDY    Date: 3/6/2024     Name: Troy Chau   MRN: [id number]    Therapy Diagnosis: mild pharyngeal dysphagia    Physician: Self, Aaareferral  Physician Orders: Fl Modified Barium Swallow Speech/SLP Video Swallow    Date of Evaluation:  3/6/2024    Procedure Min.   Fl Modified Barium Swallow Speech  35     Time in: 2:15 PM  Time out: 2:50 PM  Total Billable Time: 35 minutes    Radiation time: 5.2 minutes    Precautions: Standard, Fall, and Hearing  Subjective   History of Current Condition: Troy Chau is a 88 y.o. male here today for Modified Barium Swallow Study (MBSS). Patient's medical history is significant for COPD and HTN. Patient arrived to the ED on 03/06/2024 with complaints of SOB. The patient's hospital course has been significant for bilateral PNA with right greater than left infiltrates. He completed a bedside swallow evaluation with recommendations for MBSS.     In consideration of the interrelationships between body systems and swallowing, History was provided by patient, family, and/or taken from chart review. The following are medical conditions present in the patient's history which can result in or be attributed to dysphagia:  Respiratory Chronic Obstructive Pulmonary Disease (COPD)   Cardiovascular Hypertension treated with ACE inhibitors   Hyperlipidemia   Digestive GERD   Infections  None noted in this category   Urinary None noted in this category   Endocrine None noted in this category   Nervous   Head CT on 12/11/21 significant for: "Preferential atrophy of the frontal lobes. "   Skeletal None noted in this category   Immune None noted in this category   Cancer None noted in this category   Psychiatric  Depression   Congenital  None noted in this category   Other Smoking; 2 pks a day     -Current diet: Regular diet  -Recommended diet from previous study: NA  -Therapy received: NA    The following observations were made: " "  -Mental status: Alert and Cooperative  -Factors affecting performance: no difficulties participating in the study  -Feeding Method: independent in self-feeding    Respiratory Status:   -Respiratory Status: Nasal cannula 3L      Past Medical History: Troy Chau  has a past medical history of Arthritis, BPH (benign prostatic hyperplasia), COPD (chronic obstructive pulmonary disease), Depression, Hyperlipidemia, Hyperlipidemia, Hypertension, and Smoker.  Troy Chau  has a past surgical history that includes Colonoscopy; Appendectomy; Eye surgery; Appendectomy; and Cholecystectomy.    Medical Hx and Allergies: Review of patient's allergies indicates:  No Known Allergies    Bedside swallow evaluation with impressions and recommendations per Luana Cid M.A. CF-SLP on 03/07/2024:   "Troy Chau is a 88 y.o. male with a PMHx of COPD and HTN who presented to the ED for SOB. Pt lives at [facility] at this time and does not have any children. Pt's niece was present at bedside throughout assessment and aided in hx as pt is hard of hearing. Pt was appreciated to be oriented, with speech/language/cognition at his baseline. Pt presented with overt s/s of aspiration on trials of thin liquids and puree and s/t pt's hx of COPD, current bilateral PNA, and age he is recommended for MBSS to further assess oropharyngeal swallow. ST to complete MBSS and follow up.  "     Relevant Imaging:  CTA CHEST NON CORONARY (PE STUDIES) on 03/06/2024:      FINDINGS:  Coronary artery calcification: Present.     Pulmonary Arteries: No large or central pulmonary embolism.  Evaluation of the pulmonary arteries in the lung bases is limited secondary to motion artifact.     Aorta: Atherosclerosis. No Aneurysm.     Lungs: Bibasilar consolidations and atelectasis more pronounced in the right lung than the left     Pleural space:No pleural effusion or pneumothorax.     Heart: Cardiomegaly.  No pericardial effusion.     Bones/joints: No acute " "finding.     Other: Prominent right paratracheal lymph node measures a bowel 1.7 x 1.6 cm     Impression:  1. Slightly limited exam without large or central pulmonary embolism.  2. Focal consolidation in the right lung base with some small consolidation in the left lung base. Follow-up chest CT in 3 months is recommended to assure resolution of the pulmonary opacities.  3. Enlarged mediastinal lymph nodes are likely secondary to the acute infectious process.     Electronically signed by: Arash Broussard  Date:                                            03/06/2024     CT HEAD WITHOUT CONTRAST on 12/11/2021:   FINDINGS:  Atrophy and chronic white matter changes.  No hemorrhage mass effect or midline shift.  Preferential atrophy of the frontal lobes.  Visualized paranasal sinuses mastoid air cells are clear.     Impression:  No acute abnormality.     Atrophy and chronic microvascular ischemic changes with per pharyngeal atrophy of the frontal lobes.  Recommend correlation and follow-up.    Electronically signed by: Garry Claire  Date:                                            12/11/2021    Objective     Modified Barium Swallow Study  Purpose: to evaluate anatomy and physiology of the oropharyngeal swallow, to determine effectiveness of rehabilitation strategies, and to determine diet consistency and intervention recommendations. The study was performed using the "Gold Standard" of 30 fps with as low as reasonably achievable (ALARA) exposure.     The patient was seen in radiology seated in High Sanders's position in a video imaging chair for lateral views of the larynx. The study was conducted using Varibar thin liquid (IDDSI 0), Varibar nectar liquid (IDDSI 2), Varibar pudding (IDDSI 4), Peaches mixed with Varibar thin liquid (IDDSI 6/0), and solid coated in Varibar pudding (IDDSI 7). He tolerated the procedure well.      Consistency  Presentation  Findings Strategy Attempted Rosenbeck's Penetration/Aspiration Scale " (PAS)   Thin (IDDSI 0) Method: Self-fed    Volume: cup sip and straw sip    Projection: lateral view, esophageal sweep after intake of all consistencies   Oral phase: WFL    Pharyngeal phase: No aspiration appreciated, but pt with consistent deep trace-mild penetration during the swallow with the use of a straw. Pt with inconsistent trace deep penetration and transient penetration with cup sips. Trace-mild residue at vallecula and base of tongue.    Esophageal screen: Esophageal sweep was done in lateral view s/t pt mobility. This was also done after intake of thin liquid at the end of the study. Significant for moderate-severe retention in the esophagus as well as reflux from distal to cervical esophagus.    Best: (2) Material enters the airway, remains above the vocal folds, and is ejected from the airway    Worst: (5) Material enters the airway, contacts the vocal folds, and is not ejected from the airway  With use of straw     Puree (extremely thick/ IDDSI 4) Method: Self-fed    Volume: tsp/tbsp bite x2    Projection: lateral view Oral phase: WFL    Pharyngeal phase: No penetration/aspiration appreciated. Moderate residue at the vallecula and pyriform sinuses that pt independently clears with a sequential swallow.       Best: (1) Material does not enter the airway    Worst: (1) Material does not enter the airway     Solid (regular/ IDDSI 7) Method: Self-fed    Volume: bite x2     Projection: lateral view Oral phase: WFL    Pharyngeal phase: No penetration or aspiration appreciated. Trace amounts of residue at the vallecula and pyriform sinuses appreciated  .  Best: (1) Material does not enter the airway    Worst: (1) Material does not enter the airway     Mixed consistency (thin/ IDDSI 0 + soft and bite sized/ IDDSI 6) Method: Self-fed    Volume: bite x2     Projection: lateral view Oral phase: WFL    Pharyngeal phase:  No aspiration appreciated. One instance of trace transient penetration during the swallow,  and Trace amounts of residue at the vallecula and pyriform sinuses appreciated.     Best: (1) Material does not enter the airway    Worst: (2) Material enters the airway, remains above the vocal folds, and is ejected from the airway.   One instance         Treatment   Treatment Time In: n/a  Treatment Time Out: n/a  Total Treatment Time: n/a  Patient educated regarding results and recommendations of the evaluation. See the recommendations section below.    Education: Plan of Care, role of SLP in care, and aspiration precautions  and anatomy and physiology of swallow mechanism as it relates to MBSS findings and recommendations were discussed with the patient. Patient expressed understanding. All questions were answered.     Assessment     Troy Chau is a 88 y.o. male referred for Modified Barium Swallow Study with a medical diagnosis of bilateral pneumonia. The patient presents with mild pharyngeal dysphagia as determined by the Dysphagia Outcome and Severity Scale (ANTONI). Level 5: Mild Dysphagia.    Modified Barium Swallow Study (MBSS) revealed oral phase characterized by adequate lingual and labial strength and range of motion for tongue control, bolus preparation and transport. Lip closure was adequate with no labial escape. Bolus prep and mastication was timely and efficient. Lingual motion was brisk for adequate bolus transport. There was no significant oral residue. The swallow was initiated when the head of the bolus passed the ramus of the mandible.    Pharyngeal phase characterized by timely initiation of swallow across consistencies. The soft palate elevated for complete of the velopharyngeal port. During pharyngeal swallow, reduced base of tongue retraction, anterior hyoid excursion, laryngeal elevation, and pharyngeal stripping wave resulted in incomplete epiglottic inversion and UES opening with consistent trace-mild deep penetration during the swallow of thin liquids, and inconsistent trace deep  penetration during the swallow of thin liquids with cup sips. Also appreciated trace-moderate residue at the base of tongue, valleculae, and pyriform sinuses which was reduced or cleared with spontaneous sequential swallow. No aspiration was observed in today's study, but pt at higher risk for aspiration s/t consistent deep penetration that results in residue in laryngeal vestibule.     On esophageal screen, dysmotility and delayed esophageal emptying was noted. Further esophageal imaging including EGD and/or barium esophagram as well as follow-up with GI is recommended.    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 No aspiration was appreciated in the study, but patient presented with consistent deep penetration of thin liquids with the use of straws that was decreased with cup sips. This increases his risk for aspiration of residue that can remain in the laryngeal vestibule. It is also important to note that the instances of transient penetration with cup sips is not indicative of swallowing dysfunction. Transient laryngeal penetration is not indicative of swallowing dysfunction in patients over 65 years old (Maggie et al, 2006). Patient presented with mild-moderate amounts of residue at vallecula, base of tongue, and pyriform sinuses that he spontaneously cleared with a sequential swallow. Esophageal screen in lateral view was significant for moderate-severe esophageal retention and retrograde flow to cervical esophagus. In consideration of the Dynamic Imaging Grade of Swallowing Toxicity (DIGEST) (Jaxon et al, 2017), patient presents with moderate safety impairment and mild efficiency impairment. Patient appears to be at low-moderate risk for aspiration related PNA in consideration of three pillars of aspiration pneumonia (Odalys, 2005) including oral health status, overall health/immune status, and laryngeal vestibule closure/severity of  dysphagia. However, unable to assess risk related to aspiration pneumonia cause by the aspiration of gastric content. Patient appears to be a fair candidate for behavioral swallow rehabilitation at this time. Patient is recommended for continuation of regular diet with elimination of use of straws and implementation of small bites/sips, one bite/sip at a time, behavioral reflux precautions, and remaining upright for 2-3 hours after meals s/t esophageal retention/reflux. Patient would also benefit from further imaging such as barium esophagram and GI consult. ST will focus on behavioral swallow exercises.     Reference:     POOJA Serrano., OLGA Hamilton, JUSTINE Young, & CHET Law (2006). Laryngeal penetration during deglutition in normal subjects of various ages. Dysphagia, 21(4), 270-274. [email or web address]    Functional Oral Intake Scale (FOIS)  The Functional Oral Intake Scale (FOIS) is an ordinal scale that is used to assess the current status and meaningful change in the oral intake. FOIS levels include:    TUBE DEPENDENT (levels 1-3) 1. No oral intake  2. Tube dependent with minimal/inconsistent oral intake  3. Tube supplements with consistent oral intake      TOTAL ORAL INTAKE (levels 4-7) 4. Total oral intake of a single consistency  5. Total oral intake of multiple consistencies requiring special preparation  6. Total oral intake with no special preparation, but must avoid specific foods or liquid items  7. Total oral intake with no restrictions     Patient is currently judged to be at FOIS level 7.      References:  OLGA Morrow (2005, March). Pneumonia: Factors Beyond Aspiration. Perspectives in Swallowing and Swallowing Disorders (Dysphagia), 14, 10-16.  Laney KA, Julien MP, Fabi DA, Margy KEMPK, Christianne HY, Cindy RS, Lefty CD, Tunde SY, Nima CP, Manny J, Lazarus CL, May A, Nayeli J, Christopher JW, Glenn HM, Reginaldo JS. Dynamic Imaging Grade of Swallowing Toxicity (DIGEST): Scale  development and validation. Cancer. 2017 Jan 1;123(1):62-70. doi: 10.1002/cncr.10041. Epub 2016 Aug 26. PMID: 52509551; PMCID: [id number].    Recommendations:     Consistency Recommendations:  Thin liquids (IDDSI 0) and Solid/Regular consistencies (IDDSI 7).  No straws  Risk Management/Swallow Guidelines: use good oral hygiene , sit upright for all PO intake, increase physical mobility as tolerated, behavioral reflux precautions, small bites and sips, remain upright for at least 2-3 hours following any PO intake, and eat small meals throughout the day to reduce discomfort associated with delayed emptying of the esophagus  Specialist Referrals: GI  Ancillary Tests: Consider Barium Esophagram   Therapy: Dysphagia therapy is recommended at this time. Specifically mendelsohn maneuver and effortful swallow.   Follow-up exam: Follow up swallow study is not indicated at this time.

## 2024-03-07 NOTE — PROGRESS NOTES
[facility] Medicine  Progress Note    Patient Name: Troy Chau  MRN: [id number]  Patient Class: IP- Inpatient   Admission Date: 3/6/2024  Length of Stay: 1 days  Attending Physician: Mahendra Calix MD  Primary Care Provider: Tanvir Petersen MD    Subjective:     Principal Problem:Bilateral pneumonia        HPI:  88 y.o. male patient with a PMHx of COPD, HTN. Presented to the Emergency Department for SOB, onset several days PTA. Pt is not on home O2 and was noted to be hypoxic (SpO2 of 88% on room air), which improved to 96% on 2L NC. Symptoms are constant and moderate in severity. No mitigating or exacerbating factors reported. No associated sxs reported. Patient denies any fever, chills, n/v/d, CP, weakness, numbness, dizziness, headache, and all other sxs at this time. In the ED, vitals: 122/59, 77, 18, 98.6F, 96% 2L NC. Significant Labs: WBC: 26.51, H/H: 10.3/32.2, Na: 131, Cl: 93, trop: 0.053, ABG: pH: 7.336, PO2: 57, PCO2: 52.3. CT chest: Focal consolidation in the right lung base with some small consolidation in the left lung base. EKG: NSR. Blood cultures collected. Treated with neb, supportive O2, antibiotics. Patient is a DNR, Living Will on file. Patient admitted to Hospital Medicine for management of Sepsis, Bilateral PNA, Acute Respiratory Failure.       Overview/Hospital Course:  Troy Chau has a PMHx of COPD and HTN and presented to the ED with worsening SOB . Pt was placed on 2L NC in the ED. Significant ED Labs: WBC 26.51 , H/H 10.3/32.3, Na 131, Cl 93 , trop 0.053, ABG: pH 7.336, PO2: 57 , PCO2: 52.3. CT chest showed consolidation in the right lung base and a small consolidation in the left lung base. Pt given neb, O2 , and antibiotics in ED. He is currently admitted for Bilateral pneumonia, Sepsis, and Acute respiratory failure.  Repeat labs on 3/7 showed WBC of 16.49 , Na 131, Trop 0.048. IV Vanc and IV Cefepime continued. Given the concern for aspiration, Speech Therapy  was consulted who recommended MBSS.  This showed deep penetration of thin liquids with the use of straws which can increase his risk of aspiration of residue in the laryngeal vestibule. No aspiration in this study . Normal diet w/o straw recommended. Of note, pt with moderate-severe esophageal retention and retrograde flow to cervical esophagus. Discussed with GI and recommended esophogram.     Of note, patient was noted to have a history of Alzheimer's listed. Patient and niece denies history of or progressive cognitive impairment. She tells me she has never been told the patient has a diagnosis of dementia or Alzheimer's. She reports the diagnosis may have come from his initial placement in nursing when he became acutely agitated and confused. She denies neurology work up for dementia or Alzheimers. Patient was oriented and appropriate. He is [place] but able to follow all command.     Interval History:  Pt is lethargic. Currently on 2L of O2.      Review of Systems   Constitutional:  Negative for chills, diaphoresis, fatigue and fever.   HENT:  Negative for drooling, ear pain, rhinorrhea and sore throat.    Eyes: Negative.    Respiratory:  Positive for cough and wheezing. Negative for shortness of breath.    Cardiovascular:  Positive for leg swelling. Negative for chest pain and palpitations.   Gastrointestinal:  Negative for abdominal pain, constipation, diarrhea and nausea.   Endocrine: Negative.    Genitourinary:  Negative for dysuria, hematuria and urgency.   Musculoskeletal: Negative.    Skin:  Negative for color change and wound.   Allergic/Immunologic: Negative.    Neurological:  Negative for dizziness, syncope and speech difficulty.   Hematological: Negative.    Psychiatric/Behavioral: Negative.       Objective:     Vital Signs (Most Recent):  Temp: 99.3 °F (37.4 °C) (03/07/24 1126)  Pulse: 92 (03/07/24 1333)  Resp: 18 (03/07/24 1333)  BP: (!) 144/66 (03/07/24 1126)  SpO2: 95 % (03/07/24 1333) Vital Signs  (24h Range):  Temp:  [98.1 °F (36.7 °C)-99.3 °F (37.4 °C)] 99.3 °F (37.4 °C)  Pulse:  [74-96] 92  Resp:  [16-20] 18  SpO2:  [90 %-97 %] 95 %  BP: (111-168)/(58-74) 144/66     Weight: 96.3 kg (212 lb 4.9 oz)  Body mass index is 36.44 kg/m².    Intake/Output Summary (Last 24 hours) at 3/7/2024 1547  Last data filed at 3/7/2024 0901  Gross per 24 hour   Intake 650 ml   Output --   Net 650 ml         Physical Exam  Vitals reviewed.   Constitutional:       Appearance: Normal appearance. He is obese.      Comments: Patient is somnolent, easily arousable. [place]     HENT:      Head: Normocephalic and atraumatic.   Cardiovascular:      Rate and Rhythm: Normal rate and regular rhythm.      Pulses: Normal pulses.      Heart sounds: Normal heart sounds.   Pulmonary:      Effort: Pulmonary effort is normal. No respiratory distress.      Breath sounds: Examination of the right-lower field reveals rales. Examination of the left-lower field reveals rales. Rhonchi and rales present.      Comments: Weak/moist cough      Abdominal:      General: Bowel sounds are normal.      Palpations: Abdomen is soft.   Musculoskeletal:      Right lower leg: Edema (+1) present.      Left lower leg: Edema (+1) present.   Skin:     General: Skin is warm.      Capillary Refill: Capillary refill takes less than 2 seconds.      Comments: Skin changes consistent with venous stasis     Neurological:      Mental Status: He is oriented to person, place, and time. Mental status is at baseline.             Significant Labs: All pertinent labs within the past 24 hours have been reviewed.  CBC:   Recent Labs   Lab 03/06/24  1154 03/07/24  0459   WBC 26.51* 16.49*   HGB 10.3* 10.9*   HCT 32.2* 34.4*    281     CMP:   Recent Labs   Lab 03/06/24  1154 03/07/24  0459   * 131*   K 4.7 4.9   CL 93* 94*   CO2 26 28   * 130*   BUN 15 16   CREATININE 1.1 0.9   CALCIUM 8.9 8.4*   PROT 6.9 6.2   ALBUMIN 3.3* 3.2*   BILITOT 0.4 0.5   ALKPHOS 84 89   AST 15  14   ALT 12 12   ANIONGAP 12 9       Significant Imaging: I have reviewed all pertinent imaging results/findings within the past 24 hours.    Assessment/Plan:      * Bilateral pneumonia  Reported hypoxia, SOB at NH, per AASI 88%, placed on 2L NC. CT chest: Focal consolidation in the right lung base with some small consolidation in the left lung base.     --Supportive O2 to maintain SpO2 >92%  --labs: blood cultures, Lactic Acid, Procal  --Sputum Culture  --Vanc/Cefepime    3/7  SOB is improving today. Currently on 2L NC. Vanc/Cefepime continued. Sputum cultures pending. Speech therapy consulted from aspiration etiology. Speech recommended MBSS which showed deep penetration of thin liquids with the use of straws which can increase his risk of aspiration of residue in the laryngeal vestibule. No aspiration in this study . Normal diet w/o straw recommended. Of note, pt with moderate-severe esophageal retention and retrograde flow to cervical esophagus. Discussed with GI and recommended esophogram.     Dysphagia  See plan under Bilateral Pneumonia      Sepsis  This patient does have evidence of infective focus  My overall impression is sepsis.  Source: Respiratory  Antibiotics given-   Antibiotics (72h ago, onward)      Start     Stop Route Frequency Ordered    03/07/24 1701  ceFEPIme (MAXIPIME) 2 g in dextrose 5 % in water (D5W) 100 mL IVPB (MB+)         -- IV Every 8 hours (non-standard times) 03/07/24 0955    03/06/24 1631  vancomycin - pharmacy to dose  (vancomycin IVPB (PEDS and ADULTS))        See Hyperspace for full Linked Orders Report.    -- IV pharmacy to manage frequency 03/06/24 1531          Latest lactate reviewed-  Recent Labs   Lab 03/06/24  1609   LACTATE 2.2     Organ dysfunction indicated by Acute respiratory failure    Fluid challenge Not needed - patient is not hypotensive      Post- resuscitation assessment No - Post resuscitation assessment not needed       Will Not start Pressors- Levophed for  "MAP of 65  Source control achieved by: Vanc/Cefepime    3/7/24  Leukocytosis improving, blood cultures are negative. Sputum culture pending    Acute hypoxemic respiratory failure  Patient with Hypoxic Respiratory failure which is Acute.  he is not on home oxygen. Supplemental oxygen was provided and noted-      .   Signs/symptoms of respiratory failure include- tachypnea, increased work of breathing, and lethargy. Contributing diagnoses includes - Pneumonia Labs and images were reviewed. Patient Has recent ABG, which has been reviewed. Will treat underlying causes and adjust management of respiratory failure as follows-     --Vanc/Cefepime  --Nebs Q6H  --Supportive O2 to maintain SpO2 >92%    Type 2 diabetes mellitus with hyperglycemia  Patient's FSGs are controlled on current medication regimen.  Last A1c reviewed- No results found for: "LABA1C", "HGBA1C"  Most recent fingerstick glucose reviewed-   Recent Labs   Lab 03/06/24  1704 03/06/24  2147 03/07/24  0507 03/07/24  1111   POCTGLUCOSE 91 171* 152* 213*     Current correctional scale  Low  Maintain anti-hyperglycemic dose as follows-   Antihyperglycemics (From admission, onward)      Start     Stop Route Frequency Ordered    03/06/24 1621  insulin aspart U-100 pen 0-5 Units         -- SubQ Before meals & nightly PRN 03/06/24 1522          Hold Oral hypoglycemics while patient is in the hospital.    Late onset Alzheimer's disease without behavioral disturbance  Patient with dementia with likely etiology of alzheimer's dementia. Dementia is Unknown. The patient does not have signs of behavioral disturbance. Home dementia medications are Held or Continued: continued.. Continue non-pharmacologic interventions to prevent delirium (No VS between 11PM-5AM, activity during day, opening blinds, providing glasses/hearing aids, and up in chair during daytime). Will avoid narcotics and benzos unless absolutely necessary. PRN anti-psychotics are not prescribed to avoid self " harm behaviors.    3/7/24  Of note, patient was noted to have a history of Alzheimer's listed. Patient and niece denies history of or progressive cognitive impairment. She tells me she has never been told the patient has a diagnosis of dementia or Alzheimer's. She reports the diagnosis may have come from his initial placement in nursing when he became acutely agitated and confused. She denies neurology work up for dementia or Alzheimers. Patient was oriented and appropriate. He is [place] but able to follow all command.     Elevated troponin    0.053>0.048  Suspect demand ischemia from hypoxia and aspiration pneumonia. Downward trend  Check echocardiogram      Essential hypertension  Chronic, controlled. Latest blood pressure and vitals reviewed-     Temp:  [98.1 °F (36.7 °C)-99.3 °F (37.4 °C)]   Pulse:  [74-96]   Resp:  [16-20]   BP: (111-168)/(58-74)   SpO2:  [90 %-97 %] .   Home meds for hypertension were reviewed and noted below.   Hypertension Medications               amLODIPine (NORVASC) 5 MG tablet Take 5 mg by mouth once daily.    furosemide (LASIX) 40 MG tablet Take 40 mg by mouth once daily.            While in the hospital, will manage blood pressure as follows; Continue home antihypertensive regimen    Will utilize p.r.n. blood pressure medication only if patient's blood pressure greater than 160/100 and he develops symptoms such as worsening chest pain or shortness of breath.    COPD (chronic obstructive pulmonary disease)  Patient's COPD is with exacerbation noted by continued dyspnea currently.  Patient is currently off COPD Pathway. Continue scheduled inhalers Antibiotics and Supplemental oxygen and monitor respiratory status closely.     --Mucinex  --Duoneb Q6H      VTE Risk Mitigation (From admission, onward)           Ordered     IP VTE HIGH RISK PATIENT  Once         03/06/24 1522     Place sequential compression device  Until discontinued         03/06/24 1522                    Discharge Planning    ROXY: 3/9/2024     Code Status: DNR   Is the patient medically ready for discharge?:     Reason for patient still in hospital (select all that apply): Treatment  Discharge Plan A: Return to nursing home          Kei Nails NP  Department of Hospital Medicine   [facility] - Med Surg

## 2024-03-07 NOTE — PLAN OF CARE
[facility] - Med Surg  Initial Discharge Assessment       Primary Care Provider: Tanvir Petersen MD    Admission Diagnosis: SOB (shortness of breath) [R06.02]  Hypoxia [R09.02]  Chest pain [R07.9]  Pneumonia of both lower lobes due to infectious organism [J18.9]    Admission Date: 3/6/2024  Expected Discharge Date: Per attending    Transition of Care Barriers: None    Payor: [employer] MANAGED MEDICARE / Plan: [employer] MEDICARE HMO / Product Type: Capitation /     Extended Emergency Contact Information  Primary Emergency Contact: Bandar Ibarralis  Address: [address] [place] [address]           [place] [address] [facility]  Home Phone: [phone]  Mobile Phone: [phone]  Relation: Relative    Discharge Plan A: Return to nursing home       No Pharmacies Listed    Initial Assessment (most recent)       Adult Discharge Assessment - 03/07/24 1112          Discharge Assessment    Assessment Type Discharge Planning Assessment     Confirmed/corrected address, phone number and insurance No     Reason Other (see comment)   [facility]    Source of Information family     Does patient/caregiver understand observation status Yes     Communicated ROXY with patient/caregiver Date not available/Unable to determine     Reason For Admission Bilateral pneumonia     Facility Arrived From: [facility]     Do you expect to return to your current living situation? Yes     Do you have help at home or someone to help you manage your care at home? Yes     Who are your caregiver(s) and their phone number(s)? NH Staff     Prior to hospitilization cognitive status: Alert/Oriented     Current cognitive status: Alert/Oriented     Walking or Climbing Stairs Difficulty yes     Walking or Climbing Stairs ambulation difficulty, dependent;ambulation difficulty, requires equipment;stair climbing difficulty, dependent;transferring difficulty, dependent     Dressing/Bathing Difficulty yes     Dressing/Bathing bathing difficulty,  dependent;dressing difficulty, assistance 1 person     Home Accessibility wheelchair accessible     Equipment Currently Used at Home wheelchair     Readmission within 30 days? No     Patient currently being followed by outpatient case management? No     Do you currently have service(s) that help you manage your care at home? Yes     Name and Contact number of agency [facility]     Is the pt/caregiver preference to resume services with current agency Yes     Do you take prescription medications? Yes     Do you have prescription coverage? Yes     Coverage [employer]     Do you have any problems affording any of your prescribed medications? No     Is the patient taking medications as prescribed? yes     Who is going to help you get home at discharge? NH staff and Niece     How do you get to doctors appointments? agency     Are you on dialysis? No     Do you take coumadin? No     Discharge Plan A Return to nursing home     DME Needed Upon Discharge  none     Discharge Plan discussed with: Patient   Adult niece    Transition of Care Barriers None                      BENJAMÍN Intern met with pt to complete initial assessment and to discuss d/c planning needs. BENJAMÍN Intern introduced pt on the role of case management.  Pt has no d/c needs known at this time. BENJAMÍN Intern to follow up with planning purposes as needed.

## 2024-03-07 NOTE — ASSESSMENT & PLAN NOTE
Chronic, controlled. Latest blood pressure and vitals reviewed-     Temp:  [98.1 °F (36.7 °C)-99.3 °F (37.4 °C)]   Pulse:  [74-96]   Resp:  [16-20]   BP: (111-168)/(58-74)   SpO2:  [90 %-97 %] .   Home meds for hypertension were reviewed and noted below.   Hypertension Medications               amLODIPine (NORVASC) 5 MG tablet Take 5 mg by mouth once daily.    furosemide (LASIX) 40 MG tablet Take 40 mg by mouth once daily.            While in the hospital, will manage blood pressure as follows; Continue home antihypertensive regimen    Will utilize p.r.n. blood pressure medication only if patient's blood pressure greater than 160/100 and he develops symptoms such as worsening chest pain or shortness of breath.

## 2024-03-07 NOTE — PT/OT/SLP EVAL
Speech Language Pathology Evaluation  Cognitive/Bedside Swallow    Patient Name:  Troy Chau   MRN:  [id number]  Admitting Diagnosis: Bilateral pneumonia    Recommendations:                  General Recommendations:  Modified barium swallow study  Diet recommendations:  NPO, NPO   Aspiration Precautions: Standard aspiration precautions   General Precautions: Standard, aspiration  Communication strategies:   Pt hard of hearing    History:     Past Medical History:   Diagnosis Date    Arthritis     BPH (benign prostatic hyperplasia)     COPD (chronic obstructive pulmonary disease)     Depression     Hyperlipidemia     Hyperlipidemia     Hypertension     Smoker     2 pack a day       Past Surgical History:   Procedure Laterality Date    APPENDECTOMY      APPENDECTOMY      CHOLECYSTECTOMY      COLONOSCOPY      EYE SURGERY         Social History: Patient lives at [facility].    Prior Intubation HX:  NA    Modified Barium Swallow: NA    CTA CHEST NON CORONARY (PE STUDIES) on 03/06/2024:      FINDINGS:  Coronary artery calcification: Present.     Pulmonary Arteries: No large or central pulmonary embolism.  Evaluation of the pulmonary arteries in the lung bases is limited secondary to motion artifact.     Aorta: Atherosclerosis. No Aneurysm.     Lungs: Bibasilar consolidations and atelectasis more pronounced in the right lung than the left     Pleural space:No pleural effusion or pneumothorax.     Heart: Cardiomegaly.  No pericardial effusion.     Bones/joints: No acute finding.     Other: Prominent right paratracheal lymph node measures a bowel 1.7 x 1.6 cm     Impression:  1. Slightly limited exam without large or central pulmonary embolism.  2. Focal consolidation in the right lung base with some small consolidation in the left lung base. Follow-up chest CT in 3 months is recommended to assure resolution of the pulmonary opacities.  3. Enlarged mediastinal lymph nodes are likely secondary to the acute infectious  "process.     Electronically signed by: Arash Broussard  Date:                                            03/06/2024    Prior diet: Regular diet.    Subjective     Pt about to eat breakfast upon SLP arrival. Pt's niece at bedside and assisted with hx as pt is hard of hearing.   Patient goals: None noted     Pain/Comfort:  Pain Rating 1: 0/10  Pain Rating Post-Intervention 1: 0/10  Pain Rating 2: 0/10  Pain Rating Post-Intervention 2: 0/10    Respiratory Status: Nasal cannula    Objective:     Cognitive Status:    Pt oriented x4, he was appreciated with adequate attention and memory throughout. At baseline. Pt's niece noted that "he does not have alzheimer's he is just hard of hearing."       Receptive Language:   Comprehension:      WFL, pt with appropriate responses to all questions asked and able to follow commands.     Pragmatics:    WFL    Expressive Language:  Verbal:    Pt with fluent speech with preserved naming and repetition appreciated. At baseline and able to communicate acute wants/needs.       Motor Speech:  WFL    Voice:   Quality Hoarse    Oral Musculature Evaluation  Oral Musculature: WFL  Dentition: present and adequate  Secretion Management: adequate  Mucosal Quality: good  Mandibular Strength and Mobility: WFL  Oral Labial Strength and Mobility: WFL  Lingual Strength and Mobility: WFL  Velar Elevation: WFL  Buccal Strength and Mobility: WFL  Volitional Cough: present. Intermittently productive, weak.  Volitional Swallow: present  Voice Prior to PO Intake: hoarse    Bedside Swallow Eval:     Clinical Swallow Examination:   Of note, patient self-fed/was fed by SLP throughout evaluation. Patient presented with:     CONSISTENCY  NOTES   THIN (IDDSI 0) Cup/straw sip, sequential sips   Overt s/s of aspiration appreciated. Pt with delayed and immediate coughing after intake. Cough was weak but intermittently productive. Throat clear appreciated also.     PUREE (IDDSI 4/Extremely Thick)   TSP/TBSP bites of " grits Overt s/s of aspiration appreciated. Pt with delayed coughing and throat clear after intake. Cough was weak but intermittently productive.      SOLID (IDDSI 7/Regular) Bite of Cinthya Yun cookie    Not administered s/t overt s/s of aspiration w puree     Thickened liquids were not used in this assessment. Shabanafe (2018) reported that thickened liquids have no sound evidence at reducing the risk of pneumonia in patients with dysphagia and can cause harm by increasing their risk of dehydration. It also presents an increased risk of UTI, electrolyte imbalance, constipation, fecal impaction, cognitive impairment, functional decline and even death (Langmore, 2002; Delta, 2016).  Thickened liquids are associated with risks including dehydration, increased pharyngeal residue, potential interference with medication absorption, and decreased quality of life (Ksenia, 2013). Thickened liquids are also more likely to be silently aspirated than thin liquids (Terry et al., 2018). This supports the assertion that we should confirm a patient requires thickened liquids with an instrumental swallow study prior to recommending them.    References:   Ksenia FLETCHER (2013). Thickening agents used for dysphagia management: Effect on bioavailability of water, medication and feelings of satiety. Nutrition Journal, 12, 54. https://doi.org/10.1186/0058-7959-44-54    POOJA Stewart., PARMJIT Knapp, STEPHANIE Mills, & POOJA Maldonado. (2018). Cough response to aspiration in thin and thick fluids during FEES in hospitalized inpatients. International journal of language & communication disorders, 53(5), 909-918. https://doi.org/10.1111/5243-1192.14689    INTERPRETATION AND RISK ASSESSMENT:  Clinical swallow evaluation (CSE) revealed oral phase characterized by lingual, labial, and buccal strength and range of motion adequate for lip closure, bolus preparation and propulsion. The patient had no anterior loss of the bolus with complete closure of the lips  around the utensils. No residue remained in the oral cavity following the swallow. Patient with overt clinical sign/symptoms of aspiration on trials of thin liquid and puree consistencies. Patient reported globus sensation on trials of puree. Contributing risk factors for dysphagia include deficits in respiratory-swallow coordination. Patient with increased risk for silent aspiration given potential sensory deficits related to COPD.    Clinical signs of oropharyngeal dysphagia, likely Acute on chronic related to bilateral PNA,COPD, and ageing. Swallowing prognosis is Fair. Instrumental swallow study is indicated to assess the swallowing physiology and rule out aspiration of various consistencies.     Assessment:     Troy Chau is a 88 y.o. male with a PMHx of COPD and HTN who presented to the ED for SOB. Pt lives at [facility] at this time and does not have any children. Pt's niece was present at bedside throughout assessment and aided in hx as pt is hard of hearing. Pt was appreciated to be oriented, with speech/language/cognition at his baseline. Pt presented with overt s/s of aspiration on trials of thin liquids and puree and s/t pt's hx of COPD, current bilateral PNA, and age he is recommended for MBSS to further assess oropharyngeal swallow. ST to complete MBSS and follow up.     Goals:   Multidisciplinary Problems       SLP Goals          Problem: SLP    Goal Priority Disciplines Outcome   SLP Goal     SLP    Description: 1. Pt will complete MBSS  2. Pt will consume least restrictive PO diet.                        Plan:     Patient to be seen:  2 x/week, 3 x/week   Plan of Care expires:  03/14/24  Plan of Care reviewed with:  patient, family   SLP Follow-Up:  Yes       Discharge recommendations:  Therapy Intensity Recommendations at Discharge:  (Pt pending MBSS)   Barriers to Discharge:  None    Time Tracking:     SLP Treatment Date:   03/07/24  Speech Start Time:  1010  Speech Stop Time:  1040     Speech Total  Time (min):  30 min    Billable Minutes: Eval 15  and Eval Swallow and Oral Function 15    03/07/2024

## 2024-03-07 NOTE — ASSESSMENT & PLAN NOTE
Reported hypoxia, SOB at NH, per [facility] 88%, placed on 2L NC. CT chest: Focal consolidation in the right lung base with some small consolidation in the left lung base.     --Supportive O2 to maintain SpO2 >92%  --labs: blood cultures, Lactic Acid, Procal  --Sputum Culture  --Vanc/Cefepime    3/7  SOB is improving today. Currently on 2L NC. Vanc/Cefepime continued. Sputum cultures pending. Speech therapy consulted from aspiration etiology. Speech recommended MBSS which showed deep penetration of thin liquids with the use of straws which can increase his risk of aspiration of residue in the laryngeal vestibule. No aspiration in this study . Normal diet w/o straw recommended. Of note, pt with moderate-severe esophageal retention and retrograde flow to cervical esophagus. Discussed with GI and recommended esophogram.

## 2024-03-07 NOTE — HOSPITAL COURSE
Troy Chau has a PMHx of COPD and HTN and presented to the ED with worsening SOB . Pt was placed on 2L NC in the ED. Significant ED Labs: WBC 26.51 , H/H 10.3/32.3, Na 131, Cl 93 , trop 0.053, ABG: pH 7.336, PO2: 57 , PCO2: 52.3. CT chest showed consolidation in the right lung base and a small consolidation in the left lung base. Pt given neb, O2 , and antibiotics in ED. He is currently admitted for Bilateral pneumonia, Sepsis, and Acute respiratory failure.  Repeat labs on 3/7 showed WBC of 16.49 , Na 131, Trop 0.048. IV Vanc and IV Cefepime continued. Given the concern for aspiration, Speech Therapy was consulted who recommended MBSS.  This showed deep penetration of thin liquids with the use of straws which can increase his risk of aspiration of residue in the laryngeal vestibule. No aspiration in this study . Normal diet w/o straw recommended. Of note, pt with moderate-severe esophageal retention and retrograde flow to cervical esophagus. Discussed with GI and recommended esophogram.     Of note, patient was noted to have a history of Alzheimer's listed. Patient and niece denies history of or progressive cognitive impairment. She tells me she has never been told the patient has a diagnosis of dementia or Alzheimer's. She reports the diagnosis may have come from his initial placement in nursing when he became acutely agitated and confused. She denies neurology work up for dementia or Alzheimers. Patient was oriented and appropriate. He is [place] but able to follow all command.     3/8/24  Significant Labs today: WBC of 11.93 , Na 130, Cl 93.  Blood cultures have no growth to date. Sputum gram stain grew rare gram positive cocci and rare gram negative rods. FL Esophagram showed a patent esophagus, but was limited due to abigail aspiration. GI was consulted and recommended follow up outpatient for manometry for dysmotility. PEG vs continued PO intake was discussed with patient. Elevated troponin was thought to be due  to demand ischemia from pneumonia hypoxia.    TTE demonstrated nornal EF 60-65%, mildly increased ventricular mass, and normal diastolic function. He has been afebrile and vitals signs have been stable. IV antibiotics are continued. Will attempt to ween pt off of O2.     Care Update 3/8/1729  Patient had aspiration event this evening drinking water. Family discussion held with niece.MBSS and esophagram findings were discussed. Impaired esophageal motility places patient at increased risk for aspiration despite aspiration precautions. GOC re-evaluated. Patient's  niece does not wish to have PEG tube. She understands patient will likely continue to aspiration ultimately affecting quality of care. Hospice explored and it was decided for patient to return to NH with [facility]. Will plan to discharge in AM.     3/9/2024  [facility]. He will be discharged back to the NH, as a DNR, on pureed diet and nectar thick liquids with aspiration precautions. He will be discharged on Augmentin 875/125 mg po bid for additional 4 days to complete abx course. BC NGTD. Sputum culture with normal respiratory juan. VSS. He remains afebrile. This patient was seen and examined on the date of discharge and determined suitable for discharge.

## 2024-03-07 NOTE — PROGRESS NOTES
Pharmacokinetic Assessment Follow Up: IV Vancomycin    Vancomycin serum concentration assessment(s):    The random level was drawn correctly and can be used to guide therapy at this time. The measurement is within the desired definitive target range of 10 to 20 mcg/mL.    Vancomycin Regimen Plan:    Administer vancomycin 1500 mg IV once and obtain a random vancomycin level at 1930 on 3/7/24.    Drug levels (last 3 results):  Recent Labs   Lab Result Units 03/07/24  0459   Vancomycin, Random ug/mL 12.1       Pharmacy will continue to follow and monitor vancomycin.    Please contact pharmacy at extension [phone] for questions regarding this assessment.    Thank you for the consult,   Brennen Amrcos       Patient brief summary:  Troy Chau is a 88 y.o. male initiated on antimicrobial therapy with IV Vancomycin for treatment of sepsis    The patient's current regimen is pulse dosing.    Drug Allergies:   Review of patient's allergies indicates:  No Known Allergies    Actual Body Weight:   107.3 kg    Renal Function:   Estimated Creatinine Clearance: 62.9 mL/min (based on SCr of 0.9 mg/dL).,     Dialysis Method (if applicable):  N/A    CBC (last 72 hours):  Recent Labs   Lab Result Units 03/06/24  1154 03/07/24  0459   WBC K/uL 26.51* 16.49*   Hemoglobin g/dL 10.3* 10.9*   Hematocrit % 32.2* 34.4*   Platelets K/uL 284 281   Gran % % 92.4* 84.6*   Lymph % % 1.9* 6.5*   Mono % % 4.5 7.1   Eosinophil % % 0.3 1.0   Basophil % % 0.1 0.3   Differential Method  Automated Automated       Metabolic Panel (last 72 hours):  Recent Labs   Lab Result Units 03/06/24  1154 03/06/24  1307 03/07/24  0459   Sodium mmol/L 131*  --  131*   Potassium mmol/L 4.7  --  4.9   Chloride mmol/L 93*  --  94*   CO2 mmol/L 26  --  28   Glucose mg/dL 251*  --  130*   Glucose, UA   --  1+*  --    BUN mg/dL 15  --  16   Creatinine mg/dL 1.1  --  0.9   Albumin g/dL 3.3*  --  3.2*   Total Bilirubin mg/dL 0.4  --  0.5   Alkaline Phosphatase U/L 84  --   89   AST U/L 15  --  14   ALT U/L 12  --  12   Magnesium mg/dL  --   --  1.9   Phosphorus mg/dL  --   --  3.6       Vancomycin Administrations:  vancomycin given in the last 96 hours                     vancomycin 2 g in dextrose 5 % 500 mL IVPB (mg) 2,000 mg New Bag 03/06/24 1810                    Microbiologic Results:  Microbiology Results (last 7 days)       Procedure Component Value Units Date/Time    Blood Culture #1 **CANNOT BE ORDERED STAT** [[id number]] Collected: 03/06/24 1609    Order Status: Completed Specimen: Blood from Peripheral, Antecubital, Left Updated: 03/07/24 0515     Blood Culture, Routine No Growth to date    Blood Culture #2 **CANNOT BE ORDERED STAT** [[id number]] Collected: 03/06/24 1609    Order Status: Completed Specimen: Blood from Peripheral, Antecubital, Right Updated: 03/07/24 0515     Blood Culture, Routine No Growth to date    Blood culture [[id number]]     Order Status: Canceled Specimen: Blood     Blood culture [[id number]]     Order Status: Canceled Specimen: Blood     Influenza A & B by Molecular [[id number]] Collected: 03/06/24 1215    Order Status: Completed Specimen: Nasopharyngeal Swab Updated: 03/06/24 1251     Influenza A, Molecular Negative     Influenza B, Molecular Negative     Flu A & B Source Nasal swab

## 2024-03-07 NOTE — PROGRESS NOTES
Pharmacist Renal Dose Adjustment Note    Troy Chau is a 88 y.o. male being treated with the medication Cefepime    Patient Data:    Vital Signs (Most Recent):  Temp: 98.1 °F (36.7 °C) (03/07/24 0752)  Pulse: 81 (03/07/24 0752)  Resp: 18 (03/07/24 0752)  BP: (!) 163/69 (03/07/24 0752)  SpO2: (!) 94 % (03/07/24 0752) Vital Signs (72h Range):  Temp:  [98.1 °F (36.7 °C)-99.2 °F (37.3 °C)]   Pulse:  [70-96]   Resp:  [16-33]   BP: (111-168)/(56-75)   SpO2:  [90 %-98 %]      Recent Labs   Lab 03/06/24  1154 03/07/24  0459   CREATININE 1.1 0.9     Serum creatinine: 0.9 mg/dL 03/07/24 0459  Estimated creatinine clearance: 62.9 mL/min    Medication:Cefepime dose: 2g frequency q12 will be changed to medication:Cefepime dose:2g frequency:q8    Pharmacist's Name: Dot Bowman  Pharmacist's Extension: [phone]

## 2024-03-07 NOTE — ASSESSMENT & PLAN NOTE
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 Dementia is Unknown. The patient does not have signs of behavioral disturbance. Home dementia medications are Held or Continued: continued.. Continue non-pharmacologic interventions to prevent delirium (No VS between 11PM-5AM, activity during day, opening blinds, providing glasses/hearing aids, and up in chair during daytime). Will avoid narcotics and benzos unless absolutely necessary. PRN anti-psychotics are not prescribed to avoid self harm behaviors.    3/7/24  Of note, patient was noted to have a history of Alzheimer's listed. Patient and niece denies history of or progressive cognitive impairment. She tells me she has never been told the patient has a diagnosis of dementia or Alzheimer's. She reports the diagnosis may have come from his initial placement in nursing when he became acutely agitated and confused. She denies neurology work up for dementia or Alzheimers. Patient was oriented and appropriate. He is [place] but able to follow all command.

## 2024-03-07 NOTE — PLAN OF CARE
Discussed plan of care with patient and this patient had trouble understanding because he is hard of hearing,   Patient remains free from injury.  Safety and comfort precautions maintained this shift.   Call light and personal belongings within reach, bed in low position with bed wheels locked.  No s/s of acute distress.   Purposeful rounding continued this shift.  Pain levels are  controlled per MD order.   Cardiac monitoring in place  Diet orders continued,   Blood glucose monitoring continued this shift.  Vital signs continued per order.  Q2 repositioning per staff  Chart and orders review completed.   Patient education about care completed.     Problem: Adult Inpatient Plan of Care  Goal: Plan of Care Review  Outcome: Ongoing, Progressing  Goal: Patient-Specific Goal (Individualized)  Outcome: Ongoing, Progressing  Flowsheets (Taken 3/7/2024 0320)  Anxieties, Fears or Concerns: none  Individualized Care Needs: none  Goal: Absence of Hospital-Acquired Illness or Injury  Outcome: Ongoing, Progressing  Goal: Optimal Comfort and Wellbeing  Outcome: Ongoing, Progressing  Goal: Readiness for Transition of Care  Outcome: Ongoing, Progressing     Problem: Bariatric Environmental Safety  Goal: Safety Maintained with Care  Outcome: Ongoing, Progressing     Problem: Diabetes Comorbidity  Goal: Blood Glucose Level Within Targeted Range  Outcome: Ongoing, Progressing     Problem: Adjustment to Illness (Sepsis/Septic Shock)  Goal: Optimal Coping  Outcome: Ongoing, Progressing     Problem: Bleeding (Sepsis/Septic Shock)  Goal: Absence of Bleeding  Outcome: Ongoing, Progressing     Problem: Glycemic Control Impaired (Sepsis/Septic Shock)  Goal: Blood Glucose Level Within Desired Range  Outcome: Ongoing, Progressing     Problem: Infection Progression (Sepsis/Septic Shock)  Goal: Absence of Infection Signs and Symptoms  Outcome: Ongoing, Progressing     Problem: Nutrition Impaired (Sepsis/Septic Shock)  Goal: Optimal Nutrition  Intake  Outcome: Ongoing, Progressing     Problem: Fluid Imbalance (Pneumonia)  Goal: Fluid Balance  Outcome: Ongoing, Progressing     Problem: Infection (Pneumonia)  Goal: Resolution of Infection Signs and Symptoms  Outcome: Ongoing, Progressing     Problem: Respiratory Compromise (Pneumonia)  Goal: Effective Oxygenation and Ventilation  Outcome: Ongoing, Progressing     Problem: Skin Injury Risk Increased  Goal: Skin Health and Integrity  Outcome: Ongoing, Progressing

## 2024-03-07 NOTE — PLAN OF CARE
Care plan reviewed with patient. Turned to sides. Free from falls. No other complaints made. All orders checked and reviewed.

## 2024-03-07 NOTE — ASSESSMENT & PLAN NOTE
"Patient's FSGs are controlled on current medication regimen.  Last A1c reviewed- No results found for: "LABA1C", "HGBA1C"  Most recent fingerstick glucose reviewed-   Recent Labs   Lab 03/06/24  1704 03/06/24  2147 03/07/24  0507 03/07/24  1111   POCTGLUCOSE 91 171* 152* 213*     Current correctional scale  Low  Maintain anti-hyperglycemic dose as follows-   Antihyperglycemics (From admission, onward)    Start     Stop Route Frequency Ordered    03/06/24 1621  insulin aspart U-100 pen 0-5 Units         -- SubQ Before meals & nightly PRN 03/06/24 1522        Hold Oral hypoglycemics while patient is in the hospital.  "

## 2024-03-07 NOTE — PROGRESS NOTES
"Pharmacokinetic Initial Assessment: IV Vancomycin    Assessment/Plan:    Initiate intravenous vancomycin with loading dose of 2000 mg once with subsequent doses when random concentrations are less than 20 mcg/mL  Desired empiric serum trough concentration is 15 to 20 mcg/mL  Draw vancomycin random level on 3/7 at 0515.  Pharmacy will continue to follow and monitor vancomycin.      Please contact pharmacy at extension [phone]   with any questions regarding this assessment.     Thank you for the consult,   Suyapa Ruiz [facility]       Patient brief summary:  Troy Chau is a 88 y.o. male initiated on antimicrobial therapy with IV Vancomycin for treatment of suspected sepsis    Drug Allergies:   Review of patient's allergies indicates:  No Known Allergies    Actual Body Weight:   107.3 kg    Renal Function:   Estimated Creatinine Clearance: 51.5 mL/min (based on SCr of 1.1 mg/dL).,     Dialysis Method (if applicable):  N/A    CBC (last 72 hours):  Recent Labs   Lab Result Units 03/06/24  1154   WBC K/uL 26.51*   Hemoglobin g/dL 10.3*   Hematocrit % 32.2*   Platelets K/uL 284   Gran % % 92.4*   Lymph % % 1.9*   Mono % % 4.5   Eosinophil % % 0.3   Basophil % % 0.1   Differential Method  Automated       Metabolic Panel (last 72 hours):  Recent Labs   Lab Result Units 03/06/24  1154 03/06/24  1307   Sodium mmol/L 131*  --    Potassium mmol/L 4.7  --    Chloride mmol/L 93*  --    CO2 mmol/L 26  --    Glucose mg/dL 251*  --    Glucose, UA   --  1+*   BUN mg/dL 15  --    Creatinine mg/dL 1.1  --    Albumin g/dL 3.3*  --    Total Bilirubin mg/dL 0.4  --    Alkaline Phosphatase U/L 84  --    AST U/L 15  --    ALT U/L 12  --        Drug levels (last 3 results):  No results for input(s): "VANCOMYCINRA", "VANCORANDOM", "VANCOMYCINPE", "VANCOPEAK", "VANCOMYCINTR", "VANCOTROUGH" in the last 72 hours.    Microbiologic Results:  Microbiology Results (last 7 days)       Procedure Component Value Units Date/Time    Blood Culture #1 " **CANNOT BE ORDERED STAT** [[id number]] Collected: 03/06/24 1609    Order Status: Sent Specimen: Blood Updated: 03/06/24 1609    Blood Culture #2 **CANNOT BE ORDERED STAT** [[id number]] Collected: 03/06/24 1609    Order Status: Sent Specimen: Blood Updated: 03/06/24 1609    Blood culture [[id number]]     Order Status: Canceled Specimen: Blood     Blood culture [[id number]]     Order Status: Canceled Specimen: Blood     Influenza A & B by Molecular [[id number]] Collected: 03/06/24 1215    Order Status: Completed Specimen: Nasopharyngeal Swab Updated: 03/06/24 1251     Influenza A, Molecular Negative     Influenza B, Molecular Negative     Flu A & B Source Nasal swab

## 2024-03-07 NOTE — PROGRESS NOTES
[facility] Medicine  Progress Note    Patient Name: Troy Chau  MRN: [id number]  Patient Class: IP- Inpatient   Admission Date: 3/6/2024  Length of Stay: 1 days  Attending Physician: Mahendra Calix MD  Primary Care Provider: Tanvir Petersen MD        Subjective:     Principal Problem:Bilateral pneumonia        HPI:  88 y.o. male patient with a PMHx of COPD, HTN, Alzheimer's. Presented to the Emergency Department for SOB, onset several days PTA. Pt is not on home O2 and was noted to be hypoxic (SpO2 of 88% on room air), which improved to 96% on 2L NC. Symptoms are constant and moderate in severity. No mitigating or exacerbating factors reported. No associated sxs reported. Patient denies any fever, chills, n/v/d, CP, weakness, numbness, dizziness, headache, and all other sxs at this time. In the ED, vitals: 122/59, 77, 18, 98.6F, 96% 2L NC. Significant Labs: WBC: 26.51, H/H: 10.3/32.2, Na: 131, Cl: 93, trop: 0.053, ABG: pH: 7.336, PO2: 57, PCO2: 52.3. CT chest: Focal consolidation in the right lung base with some small consolidation in the left lung base. EKG: NSR. Blood cultures collected. Treated with neb, supportive O2, antibiotics. Patient is a DNR, Living Will on file. Patient admitted to Hospital Medicine for management of Sepsis, Bilateral PNA, Acute Respiratory Failure.       Overview/Hospital Course:  Troy Chau has a PMHx of COPD and HTN and presented to the ED with worsening SOB . Pt was placed on 2L NC in the ED. Significant ED Labs: WBC 26.51 , H/H 10.3/32.3, Na 131, Cl 93 , trop 0.053, ABG: pH 7.336, PO2: 57 , PCO2: 52.3. CT chest showed consolidation in the right lung base and a small consolidation in the left lung base. Pt given neb, O2 , and antibiotics in ED. He is currently admitted for Bilateral pneumonia, Sepsis, and Acute respiratory failure.  Repeat labs on 3/7 showed WBC of 16.49 , Na 131, Trop 0.048. IV Vanc and IV Cefepime continued. Given the concern for aspiration,  Speech Therapy was consulted who recommended MBSS.  This showed deep penetration of thin liquids with the use of straws which can increase his risk of aspiration of residue in the laryngeal vestibule. No aspiration in this study . Normal diet w/o straw recommended. Of note, pt with moderate-severe esophageal retention and retrograde flow to cervical esophagus. Discussed with GI and recommended esophogram.     Interval History:  Pt is lethargic. Currently on 2L of O2.      Review of Systems   Constitutional:  Negative for chills, diaphoresis, fatigue and fever.   HENT:  Negative for drooling, ear pain, rhinorrhea and sore throat.    Eyes: Negative.    Respiratory:  Positive for cough and wheezing. Negative for shortness of breath.    Cardiovascular:  Positive for leg swelling. Negative for chest pain and palpitations.   Gastrointestinal:  Negative for abdominal pain, constipation, diarrhea and nausea.   Endocrine: Negative.    Genitourinary:  Negative for dysuria, hematuria and urgency.   Musculoskeletal: Negative.    Skin:  Negative for color change and wound.   Allergic/Immunologic: Negative.    Neurological:  Negative for dizziness, syncope and speech difficulty.   Hematological: Negative.    Psychiatric/Behavioral: Negative.       Objective:     Vital Signs (Most Recent):  Temp: 99.3 °F (37.4 °C) (03/07/24 1126)  Pulse: 92 (03/07/24 1333)  Resp: 18 (03/07/24 1333)  BP: (!) 144/66 (03/07/24 1126)  SpO2: 95 % (03/07/24 1333) Vital Signs (24h Range):  Temp:  [98.1 °F (36.7 °C)-99.3 °F (37.4 °C)] 99.3 °F (37.4 °C)  Pulse:  [74-96] 92  Resp:  [16-20] 18  SpO2:  [90 %-97 %] 95 %  BP: (111-168)/(58-74) 144/66     Weight: 96.3 kg (212 lb 4.9 oz)  Body mass index is 36.44 kg/m².    Intake/Output Summary (Last 24 hours) at 3/7/2024 6975  Last data filed at 3/7/2024 0901  Gross per 24 hour   Intake 650 ml   Output --   Net 650 ml         Physical Exam  Vitals reviewed.   Constitutional:       Appearance: Normal appearance.  He is obese.      Comments: Pt is lethargic.   HENT:      Head: Normocephalic and atraumatic.   Cardiovascular:      Rate and Rhythm: Normal rate and regular rhythm.      Pulses: Normal pulses.      Heart sounds: Normal heart sounds.   Pulmonary:      Effort: Pulmonary effort is normal. No respiratory distress.      Breath sounds: Rhonchi present.   Abdominal:      General: Bowel sounds are normal.      Palpations: Abdomen is soft.   Skin:     General: Skin is warm.      Capillary Refill: Capillary refill takes less than 2 seconds.   Neurological:      Mental Status: He is oriented to person, place, and time. Mental status is at baseline.             Significant Labs: All pertinent labs within the past 24 hours have been reviewed.  CBC:   Recent Labs   Lab 03/06/24  1154 03/07/24  0459   WBC 26.51* 16.49*   HGB 10.3* 10.9*   HCT 32.2* 34.4*    281     CMP:   Recent Labs   Lab 03/06/24  1154 03/07/24  0459   * 131*   K 4.7 4.9   CL 93* 94*   CO2 26 28   * 130*   BUN 15 16   CREATININE 1.1 0.9   CALCIUM 8.9 8.4*   PROT 6.9 6.2   ALBUMIN 3.3* 3.2*   BILITOT 0.4 0.5   ALKPHOS 84 89   AST 15 14   ALT 12 12   ANIONGAP 12 9       Significant Imaging: I have reviewed all pertinent imaging results/findings within the past 24 hours.    Assessment/Plan:      * Bilateral pneumonia  Reported hypoxia, SOB at NH, per AASI 88%, placed on 2L NC. CT chest: Focal consolidation in the right lung base with some small consolidation in the left lung base.     --Supportive O2 to maintain SpO2 >92%  --labs: blood cultures, Lactic Acid, Procal  --Sputum Culture  --Vanc/Cefepime    3/7  SOB is improving today. Currently on 2L NC. Vanc/Cefepime continued. Sputum cultures pending. Speech therapy consulted from aspiration etiology.      Sepsis  This patient does have evidence of infective focus  My overall impression is sepsis.  Source: Respiratory  Antibiotics given-   Antibiotics (72h ago, onward)      Start     Stop Route  "Frequency Ordered    03/07/24 1701  ceFEPIme (MAXIPIME) 2 g in dextrose 5 % in water (D5W) 100 mL IVPB (MB+)         -- IV Every 8 hours (non-standard times) 03/07/24 0955    03/06/24 1631  vancomycin - pharmacy to dose  (vancomycin IVPB (PEDS and ADULTS))        See Hyperspace for full Linked Orders Report.    -- IV pharmacy to manage frequency 03/06/24 1531          Latest lactate reviewed-  Recent Labs   Lab 03/06/24  1609   LACTATE 2.2     Organ dysfunction indicated by Acute respiratory failure    Fluid challenge Not needed - patient is not hypotensive      Post- resuscitation assessment No - Post resuscitation assessment not needed       Will Not start Pressors- Levophed for MAP of 65  Source control achieved by: Vanc/Cefepime    Acute hypoxemic respiratory failure  Patient with Hypoxic Respiratory failure which is Acute.  he is not on home oxygen. Supplemental oxygen was provided and noted-      .   Signs/symptoms of respiratory failure include- tachypnea, increased work of breathing, and lethargy. Contributing diagnoses includes - Pneumonia Labs and images were reviewed. Patient Has recent ABG, which has been reviewed. Will treat underlying causes and adjust management of respiratory failure as follows-     --Vanc/Cefepime  --Nebs Q6H  --Supportive O2 to maintain SpO2 >92%    Type 2 diabetes mellitus with hyperglycemia  Patient's FSGs are controlled on current medication regimen.  Last A1c reviewed- No results found for: "LABA1C", "HGBA1C"  Most recent fingerstick glucose reviewed-   Recent Labs   Lab 03/06/24  1704 03/06/24  2147 03/07/24  0507 03/07/24  1111   POCTGLUCOSE 91 171* 152* 213*     Current correctional scale  Low  Maintain anti-hyperglycemic dose as follows-   Antihyperglycemics (From admission, onward)      Start     Stop Route Frequency Ordered    03/06/24 1621  insulin aspart U-100 pen 0-5 Units         -- SubQ Before meals & nightly PRN 03/06/24 1522          Hold Oral hypoglycemics while " patient is in the hospital.    Late onset Alzheimer's disease without behavioral disturbance  Patient with dementia with likely etiology of alzheimer's dementia. Dementia is Unknown. The patient does not have signs of behavioral disturbance. Home dementia medications are Held or Continued: continued.. Continue non-pharmacologic interventions to prevent delirium (No VS between 11PM-5AM, activity during day, opening blinds, providing glasses/hearing aids, and up in chair during daytime). Will avoid narcotics and benzos unless absolutely necessary. PRN anti-psychotics are not prescribed to avoid self harm behaviors.    Essential hypertension  Chronic, controlled. Latest blood pressure and vitals reviewed-     Temp:  [98.1 °F (36.7 °C)-99.3 °F (37.4 °C)]   Pulse:  [74-96]   Resp:  [16-20]   BP: (111-168)/(58-74)   SpO2:  [90 %-97 %] .   Home meds for hypertension were reviewed and noted below.   Hypertension Medications               amLODIPine (NORVASC) 5 MG tablet Take 5 mg by mouth once daily.    furosemide (LASIX) 40 MG tablet Take 40 mg by mouth once daily.            While in the hospital, will manage blood pressure as follows; Continue home antihypertensive regimen    Will utilize p.r.n. blood pressure medication only if patient's blood pressure greater than 160/100 and he develops symptoms such as worsening chest pain or shortness of breath.    COPD (chronic obstructive pulmonary disease)  Patient's COPD is with exacerbation noted by continued dyspnea currently.  Patient is currently off COPD Pathway. Continue scheduled inhalers Antibiotics and Supplemental oxygen and monitor respiratory status closely.     --Mucinex  --Duoneb Q6H      VTE Risk Mitigation (From admission, onward)           Ordered     IP VTE HIGH RISK PATIENT  Once         03/06/24 1522     Place sequential compression device  Until discontinued         03/06/24 1522                    Discharge Planning   ROXY: 3/9/2024     Code Status: DNR   Is  the patient medically ready for discharge?:     Reason for patient still in hospital (select all that apply): {HMREASONPATIENTINHOSP:43076}  Discharge Plan A: Return to nursing home                  Kei Nails NP  Department of Hospital Medicine   [facility] S[facility]

## 2024-03-08 PROBLEM — Z71.89 ADVANCED CARE PLANNING/COUNSELING DISCUSSION: Status: ACTIVE | Noted: 2024-03-08

## 2024-03-08 LAB
ALBUMIN SERPL BCP-MCNC: 2.8 G/DL (ref 3.5–5.2)
ALP SERPL-CCNC: 79 U/L (ref 55–135)
ALT SERPL W/O P-5'-P-CCNC: 12 U/L (ref 10–44)
ANION GAP SERPL CALC-SCNC: 7 MMOL/L (ref 8–16)
AORTIC ROOT ANNULUS: 3.7 CM
ASCENDING AORTA: 3.89 CM
AST SERPL-CCNC: 11 U/L (ref 10–40)
AV INDEX (PROSTH): 0.75
AV MEAN GRADIENT: 6 MMHG
AV PEAK GRADIENT: 10 MMHG
AV VALVE AREA BY VELOCITY RATIO: 2.14 CM²
AV VALVE AREA: 2.63 CM²
AV VELOCITY RATIO: 0.61
BASOPHILS # BLD AUTO: 0.03 K/UL (ref 0–0.2)
BASOPHILS NFR BLD: 0.3 % (ref 0–1.9)
BILIRUB SERPL-MCNC: 0.4 MG/DL (ref 0.1–1)
BSA FOR ECHO PROCEDURE: 2.08 M2
BUN SERPL-MCNC: 16 MG/DL (ref 8–23)
CALCIUM SERPL-MCNC: 8.5 MG/DL (ref 8.7–10.5)
CHLORIDE SERPL-SCNC: 93 MMOL/L (ref 95–110)
CO2 SERPL-SCNC: 30 MMOL/L (ref 23–29)
CREAT SERPL-MCNC: 0.9 MG/DL (ref 0.5–1.4)
CV ECHO LV RWT: 0.39 CM
DIFFERENTIAL METHOD BLD: ABNORMAL
DOP CALC AO PEAK VEL: 1.55 M/S
DOP CALC AO VTI: 29.9 CM
DOP CALC LVOT AREA: 3.5 CM2
DOP CALC LVOT DIAMETER: 2.12 CM
DOP CALC LVOT PEAK VEL: 0.94 M/S
DOP CALC LVOT STROKE VOLUME: 78.68 CM3
DOP CALC RVOT PEAK VEL: 0.84 M/S
DOP CALC RVOT VTI: 15.1 CM
DOP CALCLVOT PEAK VEL VTI: 22.3 CM
E WAVE DECELERATION TIME: 174.28 MSEC
E/A RATIO: 0.97
E/E' RATIO: 6.79 M/S
ECHO LV POSTERIOR WALL: 1.28 CM (ref 0.6–1.1)
EOSINOPHIL # BLD AUTO: 0.3 K/UL (ref 0–0.5)
EOSINOPHIL NFR BLD: 2.2 % (ref 0–8)
ERYTHROCYTE [DISTWIDTH] IN BLOOD BY AUTOMATED COUNT: 14.6 % (ref 11.5–14.5)
EST. GFR  (NO RACE VARIABLE): >60 ML/MIN/1.73 M^2
FRACTIONAL SHORTENING: 34 % (ref 28–44)
GLUCOSE SERPL-MCNC: 212 MG/DL (ref 70–110)
HCT VFR BLD AUTO: 31.8 % (ref 40–54)
HGB BLD-MCNC: 10.1 G/DL (ref 14–18)
IMM GRANULOCYTES # BLD AUTO: 0.09 K/UL (ref 0–0.04)
IMM GRANULOCYTES NFR BLD AUTO: 0.8 % (ref 0–0.5)
INTERVENTRICULAR SEPTUM: 1.17 CM (ref 0.6–1.1)
IVC DIAMETER: 1.45 CM
IVRT: 59.94 MSEC
LA MAJOR: 6.53 CM
LA MINOR: 4.58 CM
LA WIDTH: 4.1 CM
LEFT ATRIUM SIZE: 4.06 CM
LEFT ATRIUM VOLUME INDEX: 37.9 ML/M2
LEFT ATRIUM VOLUME: 76.18 CM3
LEFT INTERNAL DIMENSION IN SYSTOLE: 4.32 CM (ref 2.1–4)
LEFT VENTRICLE DIASTOLIC VOLUME INDEX: 110.86 ML/M2
LEFT VENTRICLE DIASTOLIC VOLUME: 222.82 ML
LEFT VENTRICLE MASS INDEX: 188 G/M2
LEFT VENTRICLE SYSTOLIC VOLUME INDEX: 41.8 ML/M2
LEFT VENTRICLE SYSTOLIC VOLUME: 84.07 ML
LEFT VENTRICULAR INTERNAL DIMENSION IN DIASTOLE: 6.59 CM (ref 3.5–6)
LEFT VENTRICULAR MASS: 377.14 G
LV LATERAL E/E' RATIO: 6.79 M/S
LV SEPTAL E/E' RATIO: 6.79 M/S
LVOT MG: 3.32 MMHG
LVOT MV: 0.91 CM/S
LYMPHOCYTES # BLD AUTO: 0.9 K/UL (ref 1–4.8)
LYMPHOCYTES NFR BLD: 7.5 % (ref 18–48)
MAGNESIUM SERPL-MCNC: 1.9 MG/DL (ref 1.6–2.6)
MCH RBC QN AUTO: 27.5 PG (ref 27–31)
MCHC RBC AUTO-ENTMCNC: 31.8 G/DL (ref 32–36)
MCV RBC AUTO: 87 FL (ref 82–98)
MONOCYTES # BLD AUTO: 0.9 K/UL (ref 0.3–1)
MONOCYTES NFR BLD: 7.2 % (ref 4–15)
MV PEAK A VEL: 0.98 M/S
MV PEAK E VEL: 0.95 M/S
NEUTROPHILS # BLD AUTO: 9.8 K/UL (ref 1.8–7.7)
NEUTROPHILS NFR BLD: 82 % (ref 38–73)
NRBC BLD-RTO: 0 /100 WBC
PHOSPHATE SERPL-MCNC: 3.1 MG/DL (ref 2.7–4.5)
PISA TR MAX VEL: 2.4 M/S
PLATELET # BLD AUTO: 262 K/UL (ref 150–450)
PMV BLD AUTO: 8.5 FL (ref 9.2–12.9)
POCT GLUCOSE: 136 MG/DL (ref 70–110)
POCT GLUCOSE: 186 MG/DL (ref 70–110)
POCT GLUCOSE: 203 MG/DL (ref 70–110)
POCT GLUCOSE: 244 MG/DL (ref 70–110)
POTASSIUM SERPL-SCNC: 4.5 MMOL/L (ref 3.5–5.1)
PROT SERPL-MCNC: 6.5 G/DL (ref 6–8.4)
PV MEAN GRADIENT: 1 MMHG
RA MAJOR: 5.39 CM
RA PRESSURE ESTIMATED: 3 MMHG
RA WIDTH: 3.6 CM
RBC # BLD AUTO: 3.67 M/UL (ref 4.6–6.2)
RV MID DIAMA: 3.52 CM
RV TB RVSP: 5 MMHG
SODIUM SERPL-SCNC: 130 MMOL/L (ref 136–145)
STJ: 3.6 CM
TDI LATERAL: 0.14 M/S
TDI SEPTAL: 0.14 M/S
TDI: 0.14 M/S
TR MAX PG: 23 MMHG
TRICUSPID ANNULAR PLANE SYSTOLIC EXCURSION: 2.18 CM
TV REST PULMONARY ARTERY PRESSURE: 26 MMHG
VANCOMYCIN SERPL-MCNC: 19.8 UG/ML
VANCOMYCIN SERPL-MCNC: 23.5 UG/ML
WBC # BLD AUTO: 11.93 K/UL (ref 3.9–12.7)
Z-SCORE OF LEFT VENTRICULAR DIMENSION IN END DIASTOLE: 1.12
Z-SCORE OF LEFT VENTRICULAR DIMENSION IN END SYSTOLE: 1.41

## 2024-03-08 PROCEDURE — 25000242 PHARM REV CODE 250 ALT 637 W/ HCPCS: Performed by: NURSE PRACTITIONER

## 2024-03-08 PROCEDURE — 99900035 HC TECH TIME PER 15 MIN (STAT)

## 2024-03-08 PROCEDURE — 36415 COLL VENOUS BLD VENIPUNCTURE: CPT | Performed by: FAMILY MEDICINE

## 2024-03-08 PROCEDURE — 21400001 HC TELEMETRY ROOM

## 2024-03-08 PROCEDURE — 63600175 PHARM REV CODE 636 W HCPCS: Performed by: NURSE PRACTITIONER

## 2024-03-08 PROCEDURE — 63600175 PHARM REV CODE 636 W HCPCS: Performed by: FAMILY MEDICINE

## 2024-03-08 PROCEDURE — 84100 ASSAY OF PHOSPHORUS: CPT | Performed by: INTERNAL MEDICINE

## 2024-03-08 PROCEDURE — 80202 ASSAY OF VANCOMYCIN: CPT | Performed by: FAMILY MEDICINE

## 2024-03-08 PROCEDURE — 83735 ASSAY OF MAGNESIUM: CPT | Performed by: INTERNAL MEDICINE

## 2024-03-08 PROCEDURE — 94640 AIRWAY INHALATION TREATMENT: CPT

## 2024-03-08 PROCEDURE — 25000003 PHARM REV CODE 250: Performed by: NURSE PRACTITIONER

## 2024-03-08 PROCEDURE — 63600175 PHARM REV CODE 636 W HCPCS: Performed by: INTERNAL MEDICINE

## 2024-03-08 PROCEDURE — 25500020 PHARM REV CODE 255: Performed by: INTERNAL MEDICINE

## 2024-03-08 PROCEDURE — 80202 ASSAY OF VANCOMYCIN: CPT | Mod: 91 | Performed by: INTERNAL MEDICINE

## 2024-03-08 PROCEDURE — 25000003 PHARM REV CODE 250: Performed by: FAMILY MEDICINE

## 2024-03-08 PROCEDURE — 25000003 PHARM REV CODE 250: Performed by: INTERNAL MEDICINE

## 2024-03-08 PROCEDURE — 27000221 HC OXYGEN, UP TO 24 HOURS

## 2024-03-08 PROCEDURE — 92526 ORAL FUNCTION THERAPY: CPT

## 2024-03-08 PROCEDURE — 36415 COLL VENOUS BLD VENIPUNCTURE: CPT | Mod: XB | Performed by: INTERNAL MEDICINE

## 2024-03-08 PROCEDURE — A9698 NON-RAD CONTRAST MATERIALNOC: HCPCS | Performed by: INTERNAL MEDICINE

## 2024-03-08 PROCEDURE — 97535 SELF CARE MNGMENT TRAINING: CPT

## 2024-03-08 PROCEDURE — 94761 N-INVAS EAR/PLS OXIMETRY MLT: CPT

## 2024-03-08 PROCEDURE — 85025 COMPLETE CBC W/AUTO DIFF WBC: CPT | Performed by: INTERNAL MEDICINE

## 2024-03-08 PROCEDURE — 80053 COMPREHEN METABOLIC PANEL: CPT | Performed by: INTERNAL MEDICINE

## 2024-03-08 RX ORDER — SODIUM CHLORIDE 9 MG/ML
INJECTION, SOLUTION INTRAVENOUS CONTINUOUS
Status: DISCONTINUED | OUTPATIENT
Start: 2024-03-08 | End: 2024-03-09

## 2024-03-08 RX ADMIN — CEFEPIME 2 G: 2 INJECTION, POWDER, FOR SOLUTION INTRAVENOUS at 02:03

## 2024-03-08 RX ADMIN — ESCITALOPRAM OXALATE 10 MG: 10 TABLET ORAL at 09:03

## 2024-03-08 RX ADMIN — POTASSIUM CHLORIDE 10 MEQ: 750 TABLET, FILM COATED, EXTENDED RELEASE ORAL at 09:03

## 2024-03-08 RX ADMIN — TRAZODONE HYDROCHLORIDE 100 MG: 100 TABLET ORAL at 08:03

## 2024-03-08 RX ADMIN — GUAIFENESIN 600 MG: 600 TABLET, EXTENDED RELEASE ORAL at 08:03

## 2024-03-08 RX ADMIN — IPRATROPIUM BROMIDE AND ALBUTEROL SULFATE 3 ML: 2.5; .5 SOLUTION RESPIRATORY (INHALATION) at 01:03

## 2024-03-08 RX ADMIN — MECLIZINE 12.5 MG: 12.5 TABLET ORAL at 08:03

## 2024-03-08 RX ADMIN — INSULIN ASPART 2 UNITS: 100 INJECTION, SOLUTION INTRAVENOUS; SUBCUTANEOUS at 12:03

## 2024-03-08 RX ADMIN — IPRATROPIUM BROMIDE AND ALBUTEROL SULFATE 3 ML: 2.5; .5 SOLUTION RESPIRATORY (INHALATION) at 07:03

## 2024-03-08 RX ADMIN — CEFEPIME 2 G: 2 INJECTION, POWDER, FOR SOLUTION INTRAVENOUS at 09:03

## 2024-03-08 RX ADMIN — AMLODIPINE BESYLATE 5 MG: 5 TABLET ORAL at 09:03

## 2024-03-08 RX ADMIN — GUAIFENESIN 600 MG: 600 TABLET, EXTENDED RELEASE ORAL at 09:03

## 2024-03-08 RX ADMIN — IPRATROPIUM BROMIDE AND ALBUTEROL SULFATE 3 ML: 2.5; .5 SOLUTION RESPIRATORY (INHALATION) at 12:03

## 2024-03-08 RX ADMIN — VANCOMYCIN HYDROCHLORIDE 1250 MG: 1.25 INJECTION, POWDER, LYOPHILIZED, FOR SOLUTION INTRAVENOUS at 12:03

## 2024-03-08 RX ADMIN — BARIUM SULFATE 25 ML: 980 POWDER, FOR SUSPENSION ORAL at 10:03

## 2024-03-08 RX ADMIN — FUROSEMIDE 40 MG: 40 TABLET ORAL at 09:03

## 2024-03-08 RX ADMIN — DOCUSATE SODIUM 100 MG: 100 CAPSULE, LIQUID FILLED ORAL at 08:03

## 2024-03-08 RX ADMIN — VANCOMYCIN HYDROCHLORIDE 1000 MG: 1 INJECTION, POWDER, LYOPHILIZED, FOR SOLUTION INTRAVENOUS at 09:03

## 2024-03-08 RX ADMIN — MECLIZINE 12.5 MG: 12.5 TABLET ORAL at 09:03

## 2024-03-08 RX ADMIN — TAMSULOSIN HYDROCHLORIDE 0.4 MG: 0.4 CAPSULE ORAL at 09:03

## 2024-03-08 RX ADMIN — SODIUM CHLORIDE: 9 INJECTION, SOLUTION INTRAVENOUS at 03:03

## 2024-03-08 RX ADMIN — INSULIN ASPART 1 UNITS: 100 INJECTION, SOLUTION INTRAVENOUS; SUBCUTANEOUS at 09:03

## 2024-03-08 RX ADMIN — CEFEPIME 2 G: 2 INJECTION, POWDER, FOR SOLUTION INTRAVENOUS at 05:03

## 2024-03-08 NOTE — ASSESSMENT & PLAN NOTE
Patient with Hypoxic Respiratory failure which is Acute.  he is not on home oxygen. Supplemental oxygen was provided and noted- Oxygen Concentration (%):  [32] 32    .   Signs/symptoms of respiratory failure include- tachypnea, increased work of breathing, and lethargy. Contributing diagnoses includes - Pneumonia Labs and images were reviewed. Patient Has recent ABG, which has been reviewed. Will treat underlying causes and adjust management of respiratory failure as follows-     --Vanc/Cefepime  --Nebs Q6H  --Supportive O2 to maintain SpO2 >92%    3/8/24   Will try to ween pt off of O2.

## 2024-03-08 NOTE — ASSESSMENT & PLAN NOTE
"Patient's FSGs are controlled on current medication regimen.  Last A1c reviewed- No results found for: "LABA1C", "HGBA1C"  Most recent fingerstick glucose reviewed-   Recent Labs   Lab 03/07/24  1606 03/07/24  2047 03/08/24  0432 03/08/24  1128   POCTGLUCOSE 163* 215* 136* 244*       Current correctional scale  Low  Maintain anti-hyperglycemic dose as follows-   Antihyperglycemics (From admission, onward)    Start     Stop Route Frequency Ordered    03/06/24 1621  insulin aspart U-100 pen 0-5 Units         -- SubQ Before meals & nightly PRN 03/06/24 1522        Hold Oral hypoglycemics while patient is in the hospital.  "

## 2024-03-08 NOTE — ASSESSMENT & PLAN NOTE
Chronic, controlled. Latest blood pressure and vitals reviewed-     Temp:  [97.8 °F (36.6 °C)-98.4 °F (36.9 °C)]   Pulse:  []   Resp:  [18-28]   BP: (123-163)/(57-82)   SpO2:  [88 %-98 %] .   Home meds for hypertension were reviewed and noted below.   Hypertension Medications               amLODIPine (NORVASC) 5 MG tablet Take 5 mg by mouth once daily.    furosemide (LASIX) 40 MG tablet Take 40 mg by mouth once daily.            While in the hospital, will manage blood pressure as follows; Continue home antihypertensive regimen    Will utilize p.r.n. blood pressure medication only if patient's blood pressure greater than 160/100 and he develops symptoms such as worsening chest pain or shortness of breath.

## 2024-03-08 NOTE — PROGRESS NOTES
[facility] Medicine  Progress Note    Patient Name: Troy Chau  MRN: [id number]  Patient Class: IP- Inpatient   Admission Date: 3/6/2024  Length of Stay: 2 days  Attending Physician: Deisy Harvey MD  Primary Care Provider: Tanvir Petersen MD        Subjective:     Principal Problem:Bilateral pneumonia        HPI:  88 y.o. male patient with a PMHx of COPD, HTN. Presented to the Emergency Department for SOB, onset several days PTA. Pt is not on home O2 and was noted to be hypoxic (SpO2 of 88% on room air), which improved to 96% on 2L NC. Symptoms are constant and moderate in severity. No mitigating or exacerbating factors reported. No associated sxs reported. Patient denies any fever, chills, n/v/d, CP, weakness, numbness, dizziness, headache, and all other sxs at this time. In the ED, vitals: 122/59, 77, 18, 98.6F, 96% 2L NC. Significant Labs: WBC: 26.51, H/H: 10.3/32.2, Na: 131, Cl: 93, trop: 0.053, ABG: pH: 7.336, PO2: 57, PCO2: 52.3. CT chest: Focal consolidation in the right lung base with some small consolidation in the left lung base. EKG: NSR. Blood cultures collected. Treated with neb, supportive O2, antibiotics. Patient is a DNR, Living Will on file. Patient admitted to Hospital Medicine for management of Sepsis, Bilateral PNA, Acute Respiratory Failure.       Overview/Hospital Course:  Troy Chau has a PMHx of COPD and HTN and presented to the ED with worsening SOB . Pt was placed on 2L NC in the ED. Significant ED Labs: WBC 26.51 , H/H 10.3/32.3, Na 131, Cl 93 , trop 0.053, ABG: pH 7.336, PO2: 57 , PCO2: 52.3. CT chest showed consolidation in the right lung base and a small consolidation in the left lung base. Pt given neb, O2 , and antibiotics in ED. He is currently admitted for Bilateral pneumonia, Sepsis, and Acute respiratory failure.  Repeat labs on 3/7 showed WBC of 16.49 , Na 131, Trop 0.048. IV Vanc and IV Cefepime continued. Given the concern for aspiration, Speech  Therapy was consulted who recommended MBSS.  This showed deep penetration of thin liquids with the use of straws which can increase his risk of aspiration of residue in the laryngeal vestibule. No aspiration in this study . Normal diet w/o straw recommended. Of note, pt with moderate-severe esophageal retention and retrograde flow to cervical esophagus. Discussed with GI and recommended esophogram.     Of note, patient was noted to have a history of Alzheimer's listed. Patient and niece denies history of or progressive cognitive impairment. She tells me she has never been told the patient has a diagnosis of dementia or Alzheimer's. She reports the diagnosis may have come from his initial placement in nursing when he became acutely agitated and confused. She denies neurology work up for dementia or Alzheimers. Patient was oriented and appropriate. He is [place] but able to follow all command.     Significant Labs today: WBC of 11.93 , Na 130, Cl 93.  Blood cultures have no growth to date. Sputum gram stain grew rare gram positive cocci and rare gram negative rods. FL Esophagram showed a patent esophagus, but was limited due to abigail aspiration. GI was consulted and recommended follow up outpatient for manometry for dysmotility. PEG vs continued PO intake was discussed with patient.  TTE pending.  Pt started on IV fluids. He has been afebrile and vitals signs have been stable. IV antibiotics are continued. Will attempt to ween pt off of O2.     Interval History:  Pt is more alert today. IV fluids and IV antibiotics continued. His cough is improving. Will ween pt off O2 if possible. Afebrile and stable vitals.     Review of Systems   Constitutional:  Negative for chills, diaphoresis, fatigue and fever.   HENT:  Negative for drooling, ear pain, rhinorrhea and sore throat.    Eyes: Negative.    Respiratory:  Positive for cough. Negative for shortness of breath and wheezing.    Cardiovascular:  Positive for leg swelling.  Negative for palpitations.   Gastrointestinal:  Positive for abdominal distention. Negative for abdominal pain, constipation, diarrhea and nausea.   Endocrine: Negative.    Genitourinary:  Negative for dysuria, hematuria and urgency.   Musculoskeletal: Negative.    Skin:  Negative for color change and wound.   Allergic/Immunologic: Negative.    Neurological:  Negative for dizziness, syncope and speech difficulty.   Hematological: Negative.    Psychiatric/Behavioral: Negative.       Objective:     Vital Signs (Most Recent):  Temp: 98.3 °F (36.8 °C) (03/08/24 1122)  Pulse: 72 (03/08/24 1122)  Resp: 18 (03/08/24 1122)  BP: (!) 123/57 (03/08/24 1122)  SpO2: 96 % (03/08/24 1122) Vital Signs (24h Range):  Temp:  [97.8 °F (36.6 °C)-98.4 °F (36.9 °C)] 98.3 °F (36.8 °C)  Pulse:  [] 72  Resp:  [18-28] 18  SpO2:  [88 %-98 %] 96 %  BP: (123-163)/(57-82) 123/57     Weight: 99.8 kg (220 lb 0.3 oz)  Body mass index is 37.77 kg/m².    Intake/Output Summary (Last 24 hours) at 3/8/2024 1144  Last data filed at 3/8/2024 0457  Gross per 24 hour   Intake 864.82 ml   Output --   Net 864.82 ml         Physical Exam  Constitutional:       Appearance: Normal appearance. He is obese.   HENT:      Head: Normocephalic and atraumatic.   Cardiovascular:      Rate and Rhythm: Normal rate and regular rhythm.      Pulses: Normal pulses.      Heart sounds: Normal heart sounds.   Pulmonary:      Effort: Pulmonary effort is normal.      Breath sounds: Rhonchi present.   Abdominal:      General: Bowel sounds are normal. There is distension.      Tenderness: There is no abdominal tenderness. There is no guarding or rebound.   Musculoskeletal:         General: Swelling present.      Comments: Bilateral LE edema   Skin:     General: Skin is warm.   Neurological:      Mental Status: He is alert and oriented to person, place, and time.             Significant Labs: All pertinent labs within the past 24 hours have been reviewed.  CBC:   Recent Labs    Lab 03/06/24  1154 03/07/24  0459   WBC 26.51* 16.49*   HGB 10.3* 10.9*   HCT 32.2* 34.4*    281     CMP:   Recent Labs   Lab 03/06/24  1154 03/07/24  0459   * 131*   K 4.7 4.9   CL 93* 94*   CO2 26 28   * 130*   BUN 15 16   CREATININE 1.1 0.9   CALCIUM 8.9 8.4*   PROT 6.9 6.2   ALBUMIN 3.3* 3.2*   BILITOT 0.4 0.5   ALKPHOS 84 89   AST 15 14   ALT 12 12   ANIONGAP 12 9       Significant Imaging: I have reviewed all pertinent imaging results/findings within the past 24 hours.    Assessment/Plan:      * Bilateral pneumonia  Reported hypoxia, SOB at NH, per AASI 88%, placed on 2L NC. CT chest: Focal consolidation in the right lung base with some small consolidation in the left lung base.     --Supportive O2 to maintain SpO2 >92%  --labs: blood cultures, Lactic Acid, Procal  --Sputum Culture  --Vanc/Cefepime    3/7  SOB is improving today. Currently on 2L NC. Vanc/Cefepime continued. Sputum cultures pending. Speech therapy consulted from aspiration etiology. Speech recommended MBSS which showed deep penetration of thin liquids with the use of straws which can increase his risk of aspiration of residue in the laryngeal vestibule. No aspiration in this study . Normal diet w/o straw recommended. Of note, pt with moderate-severe esophageal retention and retrograde flow to cervical esophagus. Discussed with GI and recommended esophogram.     3/8   Sputum gram stain showed rare gram positive cocci and rare gram negative rods. Sputum cultures still pending. IV Vanc/Cefepime continued.  WBC are now WNL.    FL Esophagram showed a patent esophagus, but was limited due to abigail aspiration. GI was consulted and recommended follow up outpatient for manometry for dysmotility. Talked to patient about PEG vs PO food intake because of aspiration risk.      Dysphagia  See plan under Bilateral Pneumonia      Sepsis  This patient does have evidence of infective focus  My overall impression is sepsis.  Source:  "Respiratory  Antibiotics given-   Antibiotics (72h ago, onward)      Start     Stop Route Frequency Ordered    03/07/24 1701  ceFEPIme (MAXIPIME) 2 g in dextrose 5 % in water (D5W) 100 mL IVPB (MB+)         -- IV Every 8 hours (non-standard times) 03/07/24 0955    03/06/24 1631  vancomycin - pharmacy to dose  (vancomycin IVPB (PEDS and ADULTS))        See Hyperspace for full Linked Orders Report.    -- IV pharmacy to manage frequency 03/06/24 1531          Latest lactate reviewed-  Recent Labs   Lab 03/06/24  1609   LACTATE 2.2       Organ dysfunction indicated by Acute respiratory failure    Fluid challenge Not needed - patient is not hypotensive      Post- resuscitation assessment No - Post resuscitation assessment not needed       Will Not start Pressors- Levophed for MAP of 65  Source control achieved by: Vanc/Cefepime    3/7/24  Leukocytosis improving, blood cultures are negative. Sputum culture pending  3/8/24  Blood cultures no growth to date. WBC WNL. Sputum culture pending. Gram stain showed gram positive cocci and gram negative rods.    Acute hypoxemic respiratory failure  Patient with Hypoxic Respiratory failure which is Acute.  he is not on home oxygen. Supplemental oxygen was provided and noted- Oxygen Concentration (%):  [32] 32    .   Signs/symptoms of respiratory failure include- tachypnea, increased work of breathing, and lethargy. Contributing diagnoses includes - Pneumonia Labs and images were reviewed. Patient Has recent ABG, which has been reviewed. Will treat underlying causes and adjust management of respiratory failure as follows-     --Vanc/Cefepime  --Nebs Q6H  --Supportive O2 to maintain SpO2 >92%    3/8/24   Will try to ween pt off of O2.    Type 2 diabetes mellitus with hyperglycemia  Patient's FSGs are controlled on current medication regimen.  Last A1c reviewed- No results found for: "LABA1C", "HGBA1C"  Most recent fingerstick glucose reviewed-   Recent Labs   Lab 03/07/24  1606 " 03/07/24 2047 03/08/24  0432 03/08/24  1128   POCTGLUCOSE 163* 215* 136* 244*       Current correctional scale  Low  Maintain anti-hyperglycemic dose as follows-   Antihyperglycemics (From admission, onward)      Start     Stop Route Frequency Ordered    03/06/24 1621  insulin aspart U-100 pen 0-5 Units         -- SubQ Before meals & nightly PRN 03/06/24 1522          Hold Oral hypoglycemics while patient is in the hospital.    Late onset Alzheimer's disease without behavioral disturbance  Patient with dementia with likely etiology of alzheimer's dementia. Dementia is Unknown. The patient does not have signs of behavioral disturbance. Home dementia medications are Held or Continued: continued.. Continue non-pharmacologic interventions to prevent delirium (No VS between 11PM-5AM, activity during day, opening blinds, providing glasses/hearing aids, and up in chair during daytime). Will avoid narcotics and benzos unless absolutely necessary. PRN anti-psychotics are not prescribed to avoid self harm behaviors.    3/7/24  Of note, patient was noted to have a history of Alzheimer's listed. Patient and niece denies history of or progressive cognitive impairment. She tells me she has never been told the patient has a diagnosis of dementia or Alzheimer's. She reports the diagnosis may have come from his initial placement in nursing when he became acutely agitated and confused. She denies neurology work up for dementia or Alzheimers. Patient was oriented and appropriate. He is [place] but able to follow all command.     Elevated troponin    0.053>0.048  Suspect demand ischemia from hypoxia and aspiration pneumonia. Downward trend  Check echocardiogram      Essential hypertension  Chronic, controlled. Latest blood pressure and vitals reviewed-     Temp:  [97.8 °F (36.6 °C)-98.4 °F (36.9 °C)]   Pulse:  []   Resp:  [18-28]   BP: (123-163)/(57-82)   SpO2:  [88 %-98 %] .   Home meds for hypertension were reviewed and noted  below.   Hypertension Medications               amLODIPine (NORVASC) 5 MG tablet Take 5 mg by mouth once daily.    furosemide (LASIX) 40 MG tablet Take 40 mg by mouth once daily.            While in the hospital, will manage blood pressure as follows; Continue home antihypertensive regimen    Will utilize p.r.n. blood pressure medication only if patient's blood pressure greater than 160/100 and he develops symptoms such as worsening chest pain or shortness of breath.    COPD (chronic obstructive pulmonary disease)  Patient's COPD is with exacerbation noted by continued dyspnea currently.  Patient is currently off COPD Pathway. Continue scheduled inhalers Antibiotics and Supplemental oxygen and monitor respiratory status closely.     --Mucinex  --Duoneb Q6H      VTE Risk Mitigation (From admission, onward)           Ordered     IP VTE HIGH RISK PATIENT  Once         03/06/24 1522     Place sequential compression device  Until discontinued         03/06/24 1522                    Discharge Planning   ROXY: 3/9/2024     Code Status: DNR   Is the patient medically ready for discharge?:     Reason for patient still in hospital (select all that apply): {HMREASONPATIENTINHOSP:92124}  Discharge Plan A: Return to nursing home                  Kei Nails NP  Department of Hospital Medicine   [facility] - Med Surg

## 2024-03-08 NOTE — PT/OT/SLP PROGRESS
Speech Language Pathology Treatment    Patient Name:  Troy Chau   MRN:  [id number]  Admitting Diagnosis: Bilateral pneumonia    Recommendations:                 General Recommendations:  Dysphagia therapy  Diet recommendations:  Regular Diet - IDDSI Level 7, Liquid Diet Level: Thin liquids - IDDSI Level 0 (No straws)   Aspiration Precautions:  No straws, 1 bite/sip at a time, Eliminate distractions, Frequent oral care, HOB to 90 degrees, Remain upright 30 minutes post meal, Small bites/sips, and Standard aspiration precautions   General Precautions: Standard, aspiration  Communication strategies:   Pt [place]    Subjective     Pt was resting in bed upon ST arrival with niece at bedside.  Patient goals: To return to [facility]     Pain/Comfort:  Pain Rating 1: 0/10  Pain Rating Post-Intervention 1: 0/10  Pain Rating 2: 0/10  Pain Rating Post-Intervention 2: 0/10    Respiratory Status: Nasal cannula    Objective:     Has the patient been evaluated by SLP for swallowing?   Yes  Keep patient NPO? No   Current Respiratory Status:        ST provided extensive education on MBSS findings, pillars of aspiration pneumonia ([place], 2005), and rationale for behavioral swallow exercises (mendelsohn and effortful swallow). Pt's niece and pt verbalized understanding. Pt engaged in 7 trials of effortful swallow with quick acclimation to exercise, and was introduced to the mendelsohn maneuver with attempts but ST will follow up to trail correct execution next session. Pt reported he was tired and wanted to rest.     Assessment:     Troy Chau is a 88 y.o. male with a PMHx of COPD and HTN who presented to the ED for SOB and presents with bilateral PNA. Pt lives at [facility] at this time and does not have any children. Pt's niece present at bedside throughout, Pt is [place] and requires repetitions for understanding. Pt was appreciated to be oriented, with speech/language/cognition at his baseline. Pt completed MBSS on 03/08/2024 with  recommendations for a regular diet (IDDSI 7/0) with no straws and the implementation of small bites/sips, one bite/sip at a time, behavioral reflux precautions, and remaining upright for 2-3 hours after meals s/t esophageal retention/reflux. No aspiration appreciated during the study, but pt with deep penetration of thin liquids that can increase his risk of aspiration of residue in laryngeal vestibule. ST provided extensive education on MBSS findings, pillars of aspiration pneumonia ([place]dayls, 2005), and rationale for behavioral swallow exercises (mendelsohn and effortful swallow). Pt's niece and pt verbalized understanding. Pt engaged in trials of both exercises and expressed he was fatigued. ST will follow-up and continue to target behavioral swallow exercises. Pt would benefit from post-acute ST.     Goals:   Multidisciplinary Problems       SLP Goals          Problem: SLP    Goal Priority Disciplines Outcome   SLP Goal     SLP Ongoing, Progressing   Description: 1. Pt will complete MBSS- MET  2. Pt will consume least restrictive PO diet.                        Plan:     Patient to be seen:  2 x/week, 3 x/week   Plan of Care expires:  03/14/24  Plan of Care reviewed with:  patient, family   SLP Follow-Up:  Yes       Discharge recommendations:  Moderate Intensity Therapy   Barriers to Discharge:  None    Time Tracking:     SLP Treatment Date:   03/08/24  Speech Start Time:  1030  Speech Stop Time:  1110     Speech Total Time (min):  40 min    Billable Minutes: Treatment Swallowing Dysfunction 10 and Self Care/Home Management Training 30    03/08/2024

## 2024-03-08 NOTE — ASSESSMENT & PLAN NOTE
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 Dementia is Unknown. The patient does not have signs of behavioral disturbance. Home dementia medications are Held or Continued: continued.. Continue non-pharmacologic interventions to prevent delirium (No VS between 11PM-5AM, activity during day, opening blinds, providing glasses/hearing aids, and up in chair during daytime). Will avoid narcotics and benzos unless absolutely necessary. PRN anti-psychotics are not prescribed to avoid self harm behaviors.    3/7/24  Of note, patient was noted to have a history of Alzheimer's listed. Patient and niece denies history of or progressive cognitive impairment. She tells me she has never been told the patient has a diagnosis of dementia or Alzheimer's. She reports the diagnosis may have come from his initial placement in nursing when he became acutely agitated and confused. She denies neurology work up for dementia or Alzheimers. Patient was oriented and appropriate. He is [place] but able to follow all command.

## 2024-03-08 NOTE — ASSESSMENT & PLAN NOTE
This patient does have evidence of infective focus  My overall impression is sepsis.  Source: Respiratory  Antibiotics given-   Antibiotics (72h ago, onward)      Start     Stop Route Frequency Ordered    03/07/24 1701  ceFEPIme (MAXIPIME) 2 g in dextrose 5 % in water (D5W) 100 mL IVPB (MB+)         -- IV Every 8 hours (non-standard times) 03/07/24 0955    03/06/24 1631  vancomycin - pharmacy to dose  (vancomycin IVPB (PEDS and ADULTS))        See Hyperspace for full Linked Orders Report.    -- IV pharmacy to manage frequency 03/06/24 1531          Latest lactate reviewed-  Recent Labs   Lab 03/06/24  1609   LACTATE 2.2       Organ dysfunction indicated by Acute respiratory failure    Fluid challenge Not needed - patient is not hypotensive      Post- resuscitation assessment No - Post resuscitation assessment not needed       Will Not start Pressors- Levophed for MAP of 65  Source control achieved by: Vanc/Cefepime    3/7/24  Leukocytosis improving, blood cultures are negative. Sputum culture pending  3/8/24  Blood cultures no growth to date. WBC WNL. Sputum culture pending. Gram stain showed gram positive cocci and gram negative rods.

## 2024-03-08 NOTE — SUBJECTIVE & OBJECTIVE
Interval History:  Pt is more alert today. IV fluids and IV antibiotics continued. His cough is improving. Will ween pt off O2 if possible. Afebrile and stable vitals.      1730: family discussion held. Decision for hospice.    Review of Systems   Constitutional:  Negative for chills, diaphoresis, fatigue and fever.   HENT:  Negative for drooling, ear pain, rhinorrhea and sore throat.    Eyes: Negative.    Respiratory:  Positive for cough. Negative for shortness of breath and wheezing.    Cardiovascular:  Positive for leg swelling. Negative for palpitations.   Gastrointestinal:  Positive for abdominal distention. Negative for abdominal pain, constipation, diarrhea and nausea.   Endocrine: Negative.    Genitourinary:  Negative for dysuria, hematuria and urgency.   Musculoskeletal: Negative.    Skin:  Negative for color change and wound.   Allergic/Immunologic: Negative.    Neurological:  Negative for dizziness, syncope and speech difficulty.   Hematological: Negative.    Psychiatric/Behavioral: Negative.       Objective:     Vital Signs (Most Recent):  Temp: 98.3 °F (36.8 °C) (03/08/24 1122)  Pulse: 72 (03/08/24 1122)  Resp: 18 (03/08/24 1122)  BP: (!) 123/57 (03/08/24 1122)  SpO2: 96 % (03/08/24 1122) Vital Signs (24h Range):  Temp:  [97.8 °F (36.6 °C)-98.4 °F (36.9 °C)] 98.3 °F (36.8 °C)  Pulse:  [] 72  Resp:  [18-28] 18  SpO2:  [88 %-98 %] 96 %  BP: (123-163)/(57-82) 123/57     Weight: 99.8 kg (220 lb 0.3 oz)  Body mass index is 37.77 kg/m².    Intake/Output Summary (Last 24 hours) at 3/8/2024 1144  Last data filed at 3/8/2024 0457  Gross per 24 hour   Intake 864.82 ml   Output --   Net 864.82 ml         Physical Exam  Constitutional:       Appearance: Normal appearance. He is obese.   HENT:      Head: Normocephalic and atraumatic.   Cardiovascular:      Rate and Rhythm: Normal rate and regular rhythm.      Pulses: Normal pulses.      Heart sounds: Normal heart sounds.   Pulmonary:      Effort: Pulmonary  effort is normal.      Breath sounds: Rhonchi present.   Abdominal:      General: Bowel sounds are normal. There is distension.      Tenderness: There is no abdominal tenderness. There is no guarding or rebound.   Musculoskeletal:         General: Swelling present.      Comments: Bilateral LE edema   Skin:     General: Skin is warm.   Neurological:      Mental Status: He is alert and oriented to person, place, and time.             Significant Labs: All pertinent labs within the past 24 hours have been reviewed.  CBC:   Recent Labs   Lab 03/06/24  1154 03/07/24  0459   WBC 26.51* 16.49*   HGB 10.3* 10.9*   HCT 32.2* 34.4*    281     CMP:   Recent Labs   Lab 03/06/24  1154 03/07/24  0459   * 131*   K 4.7 4.9   CL 93* 94*   CO2 26 28   * 130*   BUN 15 16   CREATININE 1.1 0.9   CALCIUM 8.9 8.4*   PROT 6.9 6.2   ALBUMIN 3.3* 3.2*   BILITOT 0.4 0.5   ALKPHOS 84 89   AST 15 14   ALT 12 12   ANIONGAP 12 9       Significant Imaging: I have reviewed all pertinent imaging results/findings within the past 24 hours.

## 2024-03-08 NOTE — PROGRESS NOTES
Pharmacokinetic Assessment Follow Up: IV Vancomycin    Vancomycin serum concentration assessment(s):    The random level was drawn correctly and can be used to guide therapy at this time. The measurement is above the desired definitive target range of 15 to 20 mcg/mL.    Vancomycin Regimen Plan:    Re-dose when the random level is less than 20 mcg/mL, next level to be drawn at 1800 on 3/8    Drug levels (last 3 results):  Recent Labs   Lab Result Units 03/07/24  0459 03/07/24  2226 03/08/24  1214   Vancomycin, Random ug/mL 12.1 18.5 23.5       Pharmacy will continue to follow and monitor vancomycin.    Please contact pharmacy at extension [phone] for questions regarding this assessment.    Thank you for the consult,   Lilly Blakely       Patient brief summary:  Troy Chau is a 88 y.o. male initiated on antimicrobial therapy with IV Vancomycin for treatment of lower respiratory infection    The patient's current regimen is pulse dosing    Drug Allergies:   Review of patient's allergies indicates:  No Known Allergies    Actual Body Weight:   99.8kg    Renal Function:   Estimated Creatinine Clearance: 60.5 mL/min (based on SCr of 0.9 mg/dL).,     Dialysis Method (if applicable):  N/A    CBC (last 72 hours):  Recent Labs   Lab Result Units 03/06/24  1154 03/07/24  0459 03/08/24  1214   WBC K/uL 26.51* 16.49* 11.93   Hemoglobin g/dL 10.3* 10.9* 10.1*   Hematocrit % 32.2* 34.4* 31.8*   Platelets K/uL 284 281 262   Gran % % 92.4* 84.6* 82.0*   Lymph % % 1.9* 6.5* 7.5*   Mono % % 4.5 7.1 7.2   Eosinophil % % 0.3 1.0 2.2   Basophil % % 0.1 0.3 0.3   Differential Method  Automated Automated Automated       Metabolic Panel (last 72 hours):  Recent Labs   Lab Result Units 03/06/24  1154 03/06/24  1307 03/07/24  0459 03/08/24  1214   Sodium mmol/L 131*  --  131* 130*   Potassium mmol/L 4.7  --  4.9 4.5   Chloride mmol/L 93*  --  94* 93*   CO2 mmol/L 26  --  28 30*   Glucose mg/dL 251*  --  130* 212*   Glucose, UA   --   1+*  --   --    BUN mg/dL 15  --  16 16   Creatinine mg/dL 1.1  --  0.9 0.9   Albumin g/dL 3.3*  --  3.2* 2.8*   Total Bilirubin mg/dL 0.4  --  0.5 0.4   Alkaline Phosphatase U/L 84  --  89 79   AST U/L 15  --  14 11   ALT U/L 12  --  12 12   Magnesium mg/dL  --   --  1.9 1.9   Phosphorus mg/dL  --   --  3.6 3.1       Vancomycin Administrations:  vancomycin given in the last 96 hours                     vancomycin 1,250 mg in dextrose 5 % (D5W) 250 mL IVPB (Vial-Mate) (mg) 1,250 mg New Bag 03/08/24 0027    vancomycin 1,500 mg in dextrose 5 % (D5W) 250 mL IVPB (Vial-Mate) (mg) 1,500 mg New Bag 03/07/24 0942    vancomycin 2 g in dextrose 5 % 500 mL IVPB (mg) 2,000 mg New Bag 03/06/24 1810                    Microbiologic Results:  Microbiology Results (last 7 days)       Procedure Component Value Units Date/Time    Culture, Respiratory with Gram Stain [[id number]] Collected: 03/07/24 1727    Order Status: Completed Specimen: Respiratory from Sputum, Expectorated Updated: 03/08/24 0256     Gram Stain (Respiratory) <10 epithelial cells per low power field.     Gram Stain (Respiratory) Moderate WBC's     Gram Stain (Respiratory) Rare Gram positive cocci     Gram Stain (Respiratory) Rare Gram negative rods    Blood Culture #1 **CANNOT BE ORDERED STAT** [[id number]] Collected: 03/06/24 1609    Order Status: Completed Specimen: Blood from Peripheral, Antecubital, Left Updated: 03/07/24 2322     Blood Culture, Routine No Growth to date      No Growth to date    Blood Culture #2 **CANNOT BE ORDERED STAT** [[id number]] Collected: 03/06/24 1609    Order Status: Completed Specimen: Blood from Peripheral, Antecubital, Right Updated: 03/07/24 2322     Blood Culture, Routine No Growth to date      No Growth to date    Blood culture [[id number]]     Order Status: Canceled Specimen: Blood     Blood culture [[id number]]     Order Status: Canceled Specimen: Blood     Influenza A & B by Molecular [[id number]] Collected: 03/06/24 1213     Order Status: Completed Specimen: Nasopharyngeal Swab Updated: 03/06/24 1251     Influenza A, Molecular Negative     Influenza B, Molecular Negative     Flu A & B Source Nasal swab

## 2024-03-08 NOTE — PROGRESS NOTES
Pharmacokinetic Assessment Follow Up: IV Vancomycin    Vancomycin serum concentration assessment(s):    The random level was drawn correctly and can be used to guide therapy at this time. The measurement is within the desired definitive target range of 10 to 20 mcg/mL.    Vancomycin Regimen Plan:    Administer vancomycin 1250 mg IV once and obtain a random vancomycin level at 1200 on 3/8/24.    Drug levels (last 3 results):  Recent Labs   Lab Result Units 03/07/24  0459 03/07/24  2226   Vancomycin, Random ug/mL 12.1 18.5       Pharmacy will continue to follow and monitor vancomycin.    Please contact pharmacy at extension [phone] for questions regarding this assessment.    Thank you for the consult,   Brennen Marcos       Patient brief summary:  Troy Chau is a 88 y.o. male initiated on antimicrobial therapy with IV Vancomycin for treatment of lower respiratory infection, sepsis.    Drug Allergies:   Review of patient's allergies indicates:  No Known Allergies    Actual Body Weight:   96.3 kg    Renal Function:   Estimated Creatinine Clearance: 59.4 mL/min (based on SCr of 0.9 mg/dL).,     Dialysis Method (if applicable):  N/A    CBC (last 72 hours):  Recent Labs   Lab Result Units 03/06/24  1154 03/07/24  0459   WBC K/uL 26.51* 16.49*   Hemoglobin g/dL 10.3* 10.9*   Hematocrit % 32.2* 34.4*   Platelets K/uL 284 281   Gran % % 92.4* 84.6*   Lymph % % 1.9* 6.5*   Mono % % 4.5 7.1   Eosinophil % % 0.3 1.0   Basophil % % 0.1 0.3   Differential Method  Automated Automated       Metabolic Panel (last 72 hours):  Recent Labs   Lab Result Units 03/06/24  1154 03/06/24  1307 03/07/24  0459   Sodium mmol/L 131*  --  131*   Potassium mmol/L 4.7  --  4.9   Chloride mmol/L 93*  --  94*   CO2 mmol/L 26  --  28   Glucose mg/dL 251*  --  130*   Glucose, UA   --  1+*  --    BUN mg/dL 15  --  16   Creatinine mg/dL 1.1  --  0.9   Albumin g/dL 3.3*  --  3.2*   Total Bilirubin mg/dL 0.4  --  0.5   Alkaline Phosphatase U/L 84  --  89    AST U/L 15  --  14   ALT U/L 12  --  12   Magnesium mg/dL  --   --  1.9   Phosphorus mg/dL  --   --  3.6       Vancomycin Administrations:  vancomycin given in the last 96 hours                     vancomycin 1,500 mg in dextrose 5 % (D5W) 250 mL IVPB (Vial-Mate) (mg) 1,500 mg New Bag 03/07/24 0942    vancomycin 2 g in dextrose 5 % 500 mL IVPB (mg) 2,000 mg New Bag 03/06/24 1810                    Microbiologic Results:  Microbiology Results (last 7 days)       Procedure Component Value Units Date/Time    Culture, Respiratory with Gram Stain [[id number]] Collected: 03/07/24 1727    Order Status: Sent Specimen: Respiratory from Sputum, Expectorated Updated: 03/07/24 1736    Blood Culture #1 **CANNOT BE ORDERED STAT** [[id number]] Collected: 03/06/24 1609    Order Status: Completed Specimen: Blood from Peripheral, Antecubital, Left Updated: 03/07/24 0515     Blood Culture, Routine No Growth to date    Blood Culture #2 **CANNOT BE ORDERED STAT** [[id number]] Collected: 03/06/24 1609    Order Status: Completed Specimen: Blood from Peripheral, Antecubital, Right Updated: 03/07/24 0515     Blood Culture, Routine No Growth to date    Blood culture [[id number]]     Order Status: Canceled Specimen: Blood     Blood culture [[id number]]     Order Status: Canceled Specimen: Blood     Influenza A & B by Molecular [[id number]] Collected: 03/06/24 1215    Order Status: Completed Specimen: Nasopharyngeal Swab Updated: 03/06/24 1251     Influenza A, Molecular Negative     Influenza B, Molecular Negative     Flu A & B Source Nasal swab

## 2024-03-08 NOTE — PLAN OF CARE
Discussed plan of care with patient and this patient , verbalized understanding.  Patient remains free from injury.  Safety and comfort precautions maintained this shift.   Call light and personal belongings within reach, bed in low position with bed wheels locked.  No s/s of acute distress.   Purposeful rounding continued this shift.  Pain levels are controlled per MD order. IVF infusing.  Cardiac monitoring in place,  Blood glucose monitoring continued this shift.  Vital signs continued per order.  Q2 repositioning with assistanc  Chart and orders review completed.   Patient education about care completed.     Problem: Adult Inpatient Plan of Care  Goal: Plan of Care Review  Outcome: Ongoing, Progressing  Goal: Patient-Specific Goal (Individualized)  Outcome: Ongoing, Progressing  Flowsheets (Taken 3/8/2024 0229)  Anxieties, Fears or Concerns: non at this time  Individualized Care Needs: none at this time  Goal: Absence of Hospital-Acquired Illness or Injury  Outcome: Ongoing, Progressing  Goal: Optimal Comfort and Wellbeing  Outcome: Ongoing, Progressing  Goal: Readiness for Transition of Care  Outcome: Ongoing, Progressing     Problem: Bariatric Environmental Safety  Goal: Safety Maintained with Care  Outcome: Ongoing, Progressing     Problem: Diabetes Comorbidity  Goal: Blood Glucose Level Within Targeted Range  Outcome: Ongoing, Progressing     Problem: Adjustment to Illness (Sepsis/Septic Shock)  Goal: Optimal Coping  Outcome: Ongoing, Progressing     Problem: Bleeding (Sepsis/Septic Shock)  Goal: Absence of Bleeding  Outcome: Ongoing, Progressing     Problem: Glycemic Control Impaired (Sepsis/Septic Shock)  Goal: Blood Glucose Level Within Desired Range  Outcome: Ongoing, Progressing     Problem: Infection Progression (Sepsis/Septic Shock)  Goal: Absence of Infection Signs and Symptoms  Outcome: Ongoing, Progressing     Problem: Nutrition Impaired (Sepsis/Septic Shock)  Goal: Optimal Nutrition  Intake  Outcome: Ongoing, Progressing     Problem: Fluid Imbalance (Pneumonia)  Goal: Fluid Balance  Outcome: Ongoing, Progressing     Problem: Infection (Pneumonia)  Goal: Resolution of Infection Signs and Symptoms  Outcome: Ongoing, Progressing     Problem: Respiratory Compromise (Pneumonia)  Goal: Effective Oxygenation and Ventilation  Outcome: Ongoing, Progressing     Problem: Skin Injury Risk Increased  Goal: Skin Health and Integrity  Outcome: Ongoing, Progressing     Problem: Fall Injury Risk  Goal: Absence of Fall and Fall-Related Injury  Outcome: Ongoing, Progressing

## 2024-03-08 NOTE — PROGRESS NOTES
[facility] Medicine  Progress Note    Patient Name: Troy Chau  MRN: [id number]  Patient Class: IP- Inpatient   Admission Date: 3/6/2024  Length of Stay: 2 days  Attending Physician: Deisy Harvey MD  Primary Care Provider: Tanvir Petersen MD    Subjective:     Principal Problem:Bilateral pneumonia        HPI:  88 y.o. male patient with a PMHx of COPD, HTN. Presented to the Emergency Department for SOB, onset several days PTA. Pt is not on home O2 and was noted to be hypoxic (SpO2 of 88% on room air), which improved to 96% on 2L NC. Symptoms are constant and moderate in severity. No mitigating or exacerbating factors reported. No associated sxs reported. Patient denies any fever, chills, n/v/d, CP, weakness, numbness, dizziness, headache, and all other sxs at this time. In the ED, vitals: 122/59, 77, 18, 98.6F, 96% 2L NC. Significant Labs: WBC: 26.51, H/H: 10.3/32.2, Na: 131, Cl: 93, trop: 0.053, ABG: pH: 7.336, PO2: 57, PCO2: 52.3. CT chest: Focal consolidation in the right lung base with some small consolidation in the left lung base. EKG: NSR. Blood cultures collected. Treated with neb, supportive O2, antibiotics. Patient is a DNR, Living Will on file. Patient admitted to Hospital Medicine for management of Sepsis, Bilateral PNA, Acute Respiratory Failure.       Overview/Hospital Course:  Troy Chau has a PMHx of COPD and HTN and presented to the ED with worsening SOB . Pt was placed on 2L NC in the ED. Significant ED Labs: WBC 26.51 , H/H 10.3/32.3, Na 131, Cl 93 , trop 0.053, ABG: pH 7.336, PO2: 57 , PCO2: 52.3. CT chest showed consolidation in the right lung base and a small consolidation in the left lung base. Pt given neb, O2 , and antibiotics in ED. He is currently admitted for Bilateral pneumonia, Sepsis, and Acute respiratory failure.  Repeat labs on 3/7 showed WBC of 16.49 , Na 131, Trop 0.048. IV Vanc and IV Cefepime continued. Given the concern for aspiration, Speech  Therapy was consulted who recommended MBSS.  This showed deep penetration of thin liquids with the use of straws which can increase his risk of aspiration of residue in the laryngeal vestibule. No aspiration in this study . Normal diet w/o straw recommended. Of note, pt with moderate-severe esophageal retention and retrograde flow to cervical esophagus. Discussed with GI and recommended esophogram.     Of note, patient was noted to have a history of Alzheimer's listed. Patient and niece denies history of or progressive cognitive impairment. She tells me she has never been told the patient has a diagnosis of dementia or Alzheimer's. She reports the diagnosis may have come from his initial placement in nursing when he became acutely agitated and confused. She denies neurology work up for dementia or Alzheimers. Patient was oriented and appropriate. He is [place] but able to follow all command.     3/8/24  Significant Labs today: WBC of 11.93 , Na 130, Cl 93.  Blood cultures have no growth to date. Sputum gram stain grew rare gram positive cocci and rare gram negative rods. FL Esophagram showed a patent esophagus, but was limited due to abigail aspiration. GI was consulted and recommended follow up outpatient for manometry for dysmotility. PEG vs continued PO intake was discussed with patient. Elevated troponin was thought to be due to demand ischemia from pneumonia hypoxia.    TTE demonstrated nornal EF 60-65%, mildly increased ventricular mass, and normal diastolic function. He has been afebrile and vitals signs have been stable. IV antibiotics are continued. Will attempt to ween pt off of O2.     Care Update 3/8/1729  Patient had aspiration event this evening drinking water. Family discussion held with niece.MBSS and esophagram findings were discussed. Impaired esophageal motility places patient at increased risk for aspiration despite aspiration precautions. GOC re-evaluated. Patient's  niece does not wish to have PEG  tube. She understands patient will likely continue to aspiration ultimately affecting quality of care. Hospice explored and it was decided for patient [facility]. Will plan to discharge in AM.     Interval History:  Pt is more alert today. IV fluids and IV antibiotics continued. His cough is improving. Will ween pt off O2 if possible. Afebrile and stable vitals.      1730: family discussion held. Decision for hospice.    Review of Systems   Constitutional:  Negative for chills, diaphoresis, fatigue and fever.   HENT:  Negative for drooling, ear pain, rhinorrhea and sore throat.    Eyes: Negative.    Respiratory:  Positive for cough. Negative for shortness of breath and wheezing.    Cardiovascular:  Positive for leg swelling. Negative for palpitations.   Gastrointestinal:  Positive for abdominal distention. Negative for abdominal pain, constipation, diarrhea and nausea.   Endocrine: Negative.    Genitourinary:  Negative for dysuria, hematuria and urgency.   Musculoskeletal: Negative.    Skin:  Negative for color change and wound.   Allergic/Immunologic: Negative.    Neurological:  Negative for dizziness, syncope and speech difficulty.   Hematological: Negative.    Psychiatric/Behavioral: Negative.       Objective:     Vital Signs (Most Recent):  Temp: 98.3 °F (36.8 °C) (03/08/24 1122)  Pulse: 72 (03/08/24 1122)  Resp: 18 (03/08/24 1122)  BP: (!) 123/57 (03/08/24 1122)  SpO2: 96 % (03/08/24 1122) Vital Signs (24h Range):  Temp:  [97.8 °F (36.6 °C)-98.4 °F (36.9 °C)] 98.3 °F (36.8 °C)  Pulse:  [] 72  Resp:  [18-28] 18  SpO2:  [88 %-98 %] 96 %  BP: (123-163)/(57-82) 123/57     Weight: 99.8 kg (220 lb 0.3 oz)  Body mass index is 37.77 kg/m².    Intake/Output Summary (Last 24 hours) at 3/8/2024 1144  Last data filed at 3/8/2024 0457  Gross per 24 hour   Intake 864.82 ml   Output --   Net 864.82 ml         Physical Exam  Constitutional:       Appearance: Normal appearance. He is obese.    HENT:      Head: Normocephalic and atraumatic.   Cardiovascular:      Rate and Rhythm: Normal rate and regular rhythm.      Pulses: Normal pulses.      Heart sounds: Normal heart sounds.   Pulmonary:      Effort: Pulmonary effort is normal.      Breath sounds: Rhonchi present.   Abdominal:      General: Bowel sounds are normal. There is distension.      Tenderness: There is no abdominal tenderness. There is no guarding or rebound.   Musculoskeletal:         General: Swelling present.      Comments: Bilateral LE edema   Skin:     General: Skin is warm.   Neurological:      Mental Status: He is alert and oriented to person, place, and time.             Significant Labs: All pertinent labs within the past 24 hours have been reviewed.  CBC:   Recent Labs   Lab 03/06/24  1154 03/07/24  0459   WBC 26.51* 16.49*   HGB 10.3* 10.9*   HCT 32.2* 34.4*    281     CMP:   Recent Labs   Lab 03/06/24  1154 03/07/24  0459   * 131*   K 4.7 4.9   CL 93* 94*   CO2 26 28   * 130*   BUN 15 16   CREATININE 1.1 0.9   CALCIUM 8.9 8.4*   PROT 6.9 6.2   ALBUMIN 3.3* 3.2*   BILITOT 0.4 0.5   ALKPHOS 84 89   AST 15 14   ALT 12 12   ANIONGAP 12 9       Significant Imaging: I have reviewed all pertinent imaging results/findings within the past 24 hours.    Assessment/Plan:      * Bilateral pneumonia  Reported hypoxia, SOB at NH, per AASI 88%, placed on 2L NC. CT chest: Focal consolidation in the right lung base with some small consolidation in the left lung base.     --Supportive O2 to maintain SpO2 >92%  --labs: blood cultures, Lactic Acid, Procal  --Sputum Culture  --Vanc/Cefepime    3/7  SOB is improving today. Currently on 2L NC. Vanc/Cefepime continued. Sputum cultures pending. Speech therapy consulted from aspiration etiology. Speech recommended MBSS which showed deep penetration of thin liquids with the use of straws which can increase his risk of aspiration of residue in the laryngeal vestibule. No aspiration in this  study . Normal diet w/o straw recommended. Of note, pt with moderate-severe esophageal retention and retrograde flow to cervical esophagus. Discussed with GI and recommended esophogram.     3/8   Sputum gram stain showed rare gram positive cocci and rare gram negative rods. Sputum cultures still pending. IV Vanc/Cefepime continued.  WBC are now WNL.    FL Esophagram showed a patent esophagus, but was limited due to abigail aspiration. GI was consulted and recommended follow up outpatient for manometry for dysmotility. Talked to patient about PEG vs PO food intake because of aspiration risk.    Addendum: see ACP plan.   Patient will discharge to [facility]    Advanced care planning/counseling discussion  Advance Care Planning    Date: 03/08/2024    [facility]  I engaged the patient and family in a voluntary conversation about advance care planning and we specifically addressed what the goals of care would be moving forward, in light of the patient's change in clinical status, specifically recurrent aspiration. Patent's esophagram was terminated due to abigail aspiration. He also had a witnessed aspiration event in room with reactive wheezing and dyspnea. Discussion held with daughter concern of recurrent infection and progression decline. Qualify of life vs Quantity were explored. They are not interested in PEG tube. We did specifically address the patient's likely prognosis, which is poor.  We explored the patient's values and preferences for future care.  The patient and family endorses that what is most important right now is to focus on avoiding the hospital, symptom/pain control, quality of life, even if it means sacrificing a little time, and comfort and QOL . She would like the patient to return to Nursing Home with hospice and pleasure feeds. She was reassured pleasure feeds will continue with strict aspiration precautions.     Accordingly, we have decided that the best plan to meet the  patient's goals includes enrolling in hospice care    I did explain the role for hospice care at this stage of the patient's illness, including its ability to help the patient live with the best quality of life possible.  We will be making a hospice referral.    I spent a total of >45 minutes engaging the patient in this advance care planning discussion.                Dysphagia   FL Esophagram showed a patent esophagus, but was limited due to abigail aspiration. GI was consulted and recommended follow up outpatient for manometry for dysmotility. Talked to patient about PEG vs PO food intake because of aspiration risk.      Sepsis  This patient does have evidence of infective focus  My overall impression is sepsis.  Source: Respiratory  Antibiotics given-   Antibiotics (72h ago, onward)      Start     Stop Route Frequency Ordered    03/07/24 1701  ceFEPIme (MAXIPIME) 2 g in dextrose 5 % in water (D5W) 100 mL IVPB (MB+)         -- IV Every 8 hours (non-standard times) 03/07/24 0955    03/06/24 1631  vancomycin - pharmacy to dose  (vancomycin IVPB (PEDS and ADULTS))        See Hyperspace for full Linked Orders Report.    -- IV pharmacy to manage frequency 03/06/24 1531          Latest lactate reviewed-  Recent Labs   Lab 03/06/24  1609   LACTATE 2.2       Organ dysfunction indicated by Acute respiratory failure    Fluid challenge Not needed - patient is not hypotensive      Post- resuscitation assessment No - Post resuscitation assessment not needed       Will Not start Pressors- Levophed for MAP of 65  Source control achieved by: Vanc/Cefepime    3/7/24  Leukocytosis improving, blood cultures are negative. Sputum culture pending  3/8/24  Blood cultures no growth to date. WBC WNL. Sputum culture pending. Gram stain showed gram positive cocci and gram negative rods.    Acute hypoxemic respiratory failure  Patient with Hypoxic Respiratory failure which is Acute.  he is not on home oxygen. Supplemental oxygen was provided  "and noted- Oxygen Concentration (%):  [32] 32    .   Signs/symptoms of respiratory failure include- tachypnea, increased work of breathing, and lethargy. Contributing diagnoses includes - Pneumonia Labs and images were reviewed. Patient Has recent ABG, which has been reviewed. Will treat underlying causes and adjust management of respiratory failure as follows-     --Vanc/Cefepime  --Nebs Q6H  --Supportive O2 to maintain SpO2 >92%    3/8/24   Will try to ween pt off of O2.    Type 2 diabetes mellitus with hyperglycemia  Patient's FSGs are controlled on current medication regimen.  Last A1c reviewed- No results found for: "LABA1C", "HGBA1C"  Most recent fingerstick glucose reviewed-   Recent Labs   Lab 03/07/24  1606 03/07/24  2047 03/08/24  0432 03/08/24  1128   POCTGLUCOSE 163* 215* 136* 244*       Current correctional scale  Low  Maintain anti-hyperglycemic dose as follows-   Antihyperglycemics (From admission, onward)      Start     Stop Route Frequency Ordered    03/06/24 1621  insulin aspart U-100 pen 0-5 Units         -- SubQ Before meals & nightly PRN 03/06/24 1522          Hold Oral hypoglycemics while patient is in the hospital.    Elevated troponin    0.053>0.048  Suspect demand ischemia from hypoxia and aspiration pneumonia. Downward trend  Check echocardiogram      Essential hypertension  Chronic, controlled. Latest blood pressure and vitals reviewed-     Temp:  [97.8 °F (36.6 °C)-98.4 °F (36.9 °C)]   Pulse:  []   Resp:  [18-28]   BP: (123-163)/(57-82)   SpO2:  [88 %-98 %] .   Home meds for hypertension were reviewed and noted below.   Hypertension Medications               amLODIPine (NORVASC) 5 MG tablet Take 5 mg by mouth once daily.    furosemide (LASIX) 40 MG tablet Take 40 mg by mouth once daily.            While in the hospital, will manage blood pressure as follows; Continue home antihypertensive regimen    Will utilize p.r.n. blood pressure medication only if patient's blood pressure " greater than 160/100 and he develops symptoms such as worsening chest pain or shortness of breath.    COPD (chronic obstructive pulmonary disease)  Patient's COPD is with exacerbation noted by continued dyspnea currently.  Patient is currently off COPD Pathway. Continue scheduled inhalers Antibiotics and Supplemental oxygen and monitor respiratory status closely.     --Mucinex  --Duoneb Q6H      VTE Risk Mitigation (From admission, onward)           Ordered     IP VTE HIGH RISK PATIENT  Once         03/06/24 1522     Place sequential compression device  Until discontinued         03/06/24 1522                    Discharge Planning   ROXY: 3/9/2024     Code Status: DNR   Is the patient medically ready for discharge?:     Reason for patient still in hospital (select all that apply): Treatment  Discharge Plan A: Return to nursing home, Hospice/home        Kei Nails NP  Department of Hospital Medicine   [facility] - Med Surg

## 2024-03-08 NOTE — ASSESSMENT & PLAN NOTE
Advance Care Planning     Date: 03/08/2024    [facility]  I engaged the patient and family in a voluntary conversation about advance care planning and we specifically addressed what the goals of care would be moving forward, in light of the patient's change in clinical status, specifically recurrent aspiration. Patent's esophagram was terminated due to abigail aspiration. He also had a witnessed aspiration event in room with reactive wheezing and dyspnea. Discussion held with daughter concern of recurrent infection and progression decline. Qualify of life vs Quantity were explored. They are not interested in PEG tube. We did specifically address the patient's likely prognosis, which is poor.  We explored the patient's values and preferences for future care.  The patient and family endorses that what is most important right now is to focus on avoiding the hospital, symptom/pain control, quality of life, even if it means sacrificing a little time, and comfort and QOL . She would like the patient to return to Nursing Home with hospice and pleasure feeds. She was reassured pleasure feeds will continue with strict aspiration precautions.     Accordingly, we have decided that the best plan to meet the patient's goals includes enrolling in hospice care    I did explain the role for hospice care at this stage of the patient's illness, including its ability to help the patient live with the best quality of life possible.  We will be making a hospice referral.    I spent a total of >45 minutes engaging the patient in this advance care planning discussion.

## 2024-03-08 NOTE — ASSESSMENT & PLAN NOTE
FL Esophagram showed a patent esophagus, but was limited due to abigail aspiration. GI was consulted and recommended follow up outpatient for manometry for dysmotility. Talked to patient about PEG vs PO food intake because of aspiration risk.

## 2024-03-08 NOTE — PLAN OF CARE
03/08/24 1723   Post-Acute Status   Post-Acute Authorization Hospice   Hospice Status Referrals Sent   Discharge Plan   Discharge Plan A Return to nursing home;Hospice/home     Referral to [facility], preferred provider for [facility].    Prema with [facility] notified of planned return with hospice.

## 2024-03-08 NOTE — ASSESSMENT & PLAN NOTE
Reported hypoxia, SOB at NH, per AASI 88%, placed on 2L NC. CT chest: Focal consolidation in the right lung base with some small consolidation in the left lung base.     --Supportive O2 to maintain SpO2 >92%  --labs: blood cultures, Lactic Acid, Procal  --Sputum Culture  --Vanc/Cefepime    3/7  SOB is improving today. Currently on 2L NC. Vanc/Cefepime continued. Sputum cultures pending. Speech therapy consulted from aspiration etiology. Speech recommended MBSS which showed deep penetration of thin liquids with the use of straws which can increase his risk of aspiration of residue in the laryngeal vestibule. No aspiration in this study . Normal diet w/o straw recommended. Of note, pt with moderate-severe esophageal retention and retrograde flow to cervical esophagus. Discussed with GI and recommended esophogram.     3/8   Sputum gram stain showed rare gram positive cocci and rare gram negative rods. Sputum cultures still pending. IV Vanc/Cefepime continued.  WBC are now WNL.    FL Esophagram showed a patent esophagus, but was limited due to abigail aspiration. GI was consulted and recommended follow up outpatient for manometry for dysmotility. Talked to patient about PEG vs PO food intake because of aspiration risk.    Addendum: see ACP plan.   Patient will [facility] to [facility]

## 2024-03-09 VITALS
BODY MASS INDEX: 37.56 KG/M2 | HEIGHT: 64 IN | DIASTOLIC BLOOD PRESSURE: 70 MMHG | WEIGHT: 220 LBS | HEART RATE: 80 BPM | SYSTOLIC BLOOD PRESSURE: 144 MMHG | RESPIRATION RATE: 16 BRPM | TEMPERATURE: 98 F | OXYGEN SATURATION: 96 %

## 2024-03-09 LAB
POCT GLUCOSE: 160 MG/DL (ref 70–110)
POCT GLUCOSE: 216 MG/DL (ref 70–110)

## 2024-03-09 PROCEDURE — 63600175 PHARM REV CODE 636 W HCPCS: Performed by: FAMILY MEDICINE

## 2024-03-09 PROCEDURE — 25000003 PHARM REV CODE 250: Performed by: FAMILY MEDICINE

## 2024-03-09 PROCEDURE — 25000003 PHARM REV CODE 250: Performed by: INTERNAL MEDICINE

## 2024-03-09 PROCEDURE — 25000003 PHARM REV CODE 250: Performed by: NURSE PRACTITIONER

## 2024-03-09 PROCEDURE — 94640 AIRWAY INHALATION TREATMENT: CPT

## 2024-03-09 PROCEDURE — 99900035 HC TECH TIME PER 15 MIN (STAT)

## 2024-03-09 PROCEDURE — 27000221 HC OXYGEN, UP TO 24 HOURS

## 2024-03-09 PROCEDURE — 25000242 PHARM REV CODE 250 ALT 637 W/ HCPCS: Performed by: INTERNAL MEDICINE

## 2024-03-09 PROCEDURE — 94761 N-INVAS EAR/PLS OXIMETRY MLT: CPT

## 2024-03-09 RX ORDER — ACETAZOLAMIDE 250 MG/1
500 TABLET ORAL ONCE
Status: COMPLETED | OUTPATIENT
Start: 2024-03-09 | End: 2024-03-09

## 2024-03-09 RX ORDER — AMOXICILLIN AND CLAVULANATE POTASSIUM 875; 125 MG/1; MG/1
1 TABLET, FILM COATED ORAL 2 TIMES DAILY
Qty: 8 TABLET | Refills: 0 | Status: SHIPPED | OUTPATIENT
Start: 2024-03-09 | End: 2024-03-13

## 2024-03-09 RX ORDER — ALPRAZOLAM 0.25 MG/1
0.25 TABLET ORAL 2 TIMES DAILY PRN
Qty: 10 TABLET | Refills: 0 | Status: SHIPPED | OUTPATIENT
Start: 2024-03-09

## 2024-03-09 RX ORDER — IPRATROPIUM BROMIDE AND ALBUTEROL SULFATE 2.5; .5 MG/3ML; MG/3ML
3 SOLUTION RESPIRATORY (INHALATION)
Status: DISCONTINUED | OUTPATIENT
Start: 2024-03-09 | End: 2024-03-09 | Stop reason: HOSPADM

## 2024-03-09 RX ADMIN — ACETAZOLAMIDE 500 MG: 250 TABLET ORAL at 09:03

## 2024-03-09 RX ADMIN — FUROSEMIDE 40 MG: 40 TABLET ORAL at 09:03

## 2024-03-09 RX ADMIN — TAMSULOSIN HYDROCHLORIDE 0.4 MG: 0.4 CAPSULE ORAL at 09:03

## 2024-03-09 RX ADMIN — CEFEPIME 2 G: 2 INJECTION, POWDER, FOR SOLUTION INTRAVENOUS at 01:03

## 2024-03-09 RX ADMIN — GUAIFENESIN 600 MG: 600 TABLET, EXTENDED RELEASE ORAL at 09:03

## 2024-03-09 RX ADMIN — MECLIZINE 12.5 MG: 12.5 TABLET ORAL at 09:03

## 2024-03-09 RX ADMIN — CEFEPIME 2 G: 2 INJECTION, POWDER, FOR SOLUTION INTRAVENOUS at 09:03

## 2024-03-09 RX ADMIN — ESCITALOPRAM OXALATE 10 MG: 10 TABLET ORAL at 09:03

## 2024-03-09 RX ADMIN — POTASSIUM CHLORIDE 10 MEQ: 750 TABLET, FILM COATED, EXTENDED RELEASE ORAL at 09:03

## 2024-03-09 RX ADMIN — IPRATROPIUM BROMIDE AND ALBUTEROL SULFATE 3 ML: 2.5; .5 SOLUTION RESPIRATORY (INHALATION) at 12:03

## 2024-03-09 RX ADMIN — IPRATROPIUM BROMIDE AND ALBUTEROL SULFATE 3 ML: 2.5; .5 SOLUTION RESPIRATORY (INHALATION) at 08:03

## 2024-03-09 RX ADMIN — AMLODIPINE BESYLATE 5 MG: 5 TABLET ORAL at 09:03

## 2024-03-09 NOTE — PLAN OF CARE
Discussed plan of care with patient and the patient, verbalized understanding.  Patient remains free from injury.  Safety and comfort precautions maintained this shift.   Call light and personal belongings within reach, bed in low position with bed wheels locked.  No s/s of acute distress.   Purposeful rounding continued this shift.  Pain levels are controlled per MD order. IVF infusing.  Cardiac monitoring in place  Blood glucose monitoring continued this shift.  Vital signs continued per order.  Q2 repositioning by staff  Chart and orders review completed.   Patient education about care completed.    Problem: Adult Inpatient Plan of Care  Goal: Plan of Care Review  Outcome: Ongoing, Progressing  Goal: Patient-Specific Goal (Individualized)  Outcome: Ongoing, Progressing  Flowsheets (Taken 3/9/2024 0318)  Anxieties, Fears or Concerns: none  Individualized Care Needs: none  Goal: Absence of Hospital-Acquired Illness or Injury  Outcome: Ongoing, Progressing  Goal: Optimal Comfort and Wellbeing  Outcome: Ongoing, Progressing  Goal: Readiness for Transition of Care  Outcome: Ongoing, Progressing     Problem: Bariatric Environmental Safety  Goal: Safety Maintained with Care  Outcome: Ongoing, Progressing     Problem: Diabetes Comorbidity  Goal: Blood Glucose Level Within Targeted Range  Outcome: Ongoing, Progressing     Problem: Adjustment to Illness (Sepsis/Septic Shock)  Goal: Optimal Coping  Outcome: Ongoing, Progressing     Problem: Bleeding (Sepsis/Septic Shock)  Goal: Absence of Bleeding  Outcome: Ongoing, Progressing     Problem: Glycemic Control Impaired (Sepsis/Septic Shock)  Goal: Blood Glucose Level Within Desired Range  Outcome: Ongoing, Progressing     Problem: Infection Progression (Sepsis/Septic Shock)  Goal: Absence of Infection Signs and Symptoms  Outcome: Ongoing, Progressing     Problem: Nutrition Impaired (Sepsis/Septic Shock)  Goal: Optimal Nutrition Intake  Outcome: Ongoing, Progressing      Problem: Fluid Imbalance (Pneumonia)  Goal: Fluid Balance  Outcome: Ongoing, Progressing     Problem: Infection (Pneumonia)  Goal: Resolution of Infection Signs and Symptoms  Outcome: Ongoing, Progressing     Problem: Respiratory Compromise (Pneumonia)  Goal: Effective Oxygenation and Ventilation  Outcome: Ongoing, Progressing     Problem: Skin Injury Risk Increased  Goal: Skin Health and Integrity  Outcome: Ongoing, Progressing     Problem: Fall Injury Risk  Goal: Absence of Fall and Fall-Related Injury  Outcome: Ongoing, Progressing

## 2024-03-09 NOTE — PROGRESS NOTES
Pharmacokinetic Assessment Sign Off: IV Vancomycin     Therapy with Vancomycin complete and/or consult discontinued by provider.  Pharmacy will sign off, please re-consult as needed.     Thank you for allowing us to participate in this patient's care.      Mary Shrestha PharmD 03/09/2024 12:23 PM

## 2024-03-09 NOTE — DISCHARGE SUMMARY
[facility] Medicine  Discharge Summary      Patient Name: Troy Chau  MRN: [id number]  [facility]: [id number]  Patient Class: IP- Inpatient  Admission Date: 3/6/2024  Hospital Length of Stay: 3 days  Discharge Date and Time:  03/09/2024 12:07 PM  Attending Physician: Deisy Harvey MD   Discharging Provider: JUSTIN Chinchilla  Primary Care Provider: Tanvir Petersen MD    Primary Care Team: Networked reference to record PCT     HPI:   88 y.o. male patient with a PMHx of COPD, HTN. Presented to the Emergency Department for SOB, onset several days PTA. Pt is not on home O2 and was noted to be hypoxic (SpO2 of 88% on room air), which improved to 96% on 2L NC. Symptoms are constant and moderate in severity. No mitigating or exacerbating factors reported. No associated sxs reported. Patient denies any fever, chills, n/v/d, CP, weakness, numbness, dizziness, headache, and all other sxs at this time. In the ED, vitals: 122/59, 77, 18, 98.6F, 96% 2L NC. Significant Labs: WBC: 26.51, H/H: 10.3/32.2, Na: 131, Cl: 93, trop: 0.053, ABG: pH: 7.336, PO2: 57, PCO2: 52.3. CT chest: Focal consolidation in the right lung base with some small consolidation in the left lung base. EKG: NSR. Blood cultures collected. Treated with neb, supportive O2, antibiotics. Patient is a DNR, Living Will on file. Patient admitted to Hospital Medicine for management of Sepsis, Bilateral PNA, Acute Respiratory Failure.       * No surgery found *      Hospital Course:   Troy Chau has a PMHx of COPD and HTN and presented to the ED with worsening SOB . Pt was placed on 2L NC in the ED. Significant ED Labs: WBC 26.51 , H/H 10.3/32.3, Na 131, Cl 93 , trop 0.053, ABG: pH 7.336, PO2: 57 , PCO2: 52.3. CT chest showed consolidation in the right lung base and a small consolidation in the left lung base. Pt given neb, O2 , and antibiotics in ED. He is currently admitted for Bilateral pneumonia, Sepsis, and Acute respiratory failure.   Repeat labs on 3/7 showed WBC of 16.49 , Na 131, Trop 0.048. IV Vanc and IV Cefepime continued. Given the concern for aspiration, Speech Therapy was consulted who recommended MBSS.  This showed deep penetration of thin liquids with the use of straws which can increase his risk of aspiration of residue in the laryngeal vestibule. No aspiration in this study . Normal diet w/o straw recommended. Of note, pt with moderate-severe esophageal retention and retrograde flow to cervical esophagus. Discussed with GI and recommended esophogram.     Of note, patient was noted to have a history of Alzheimer's listed. Patient and niece denies history of or progressive cognitive impairment. She tells me she has never been told the patient has a diagnosis of dementia or Alzheimer's. She reports the diagnosis may have come from his initial placement in nursing when he became acutely agitated and confused. She denies neurology work up for dementia or Alzheimers. Patient was oriented and appropriate. He is [place] but able to follow all command.     3/8/24  Significant Labs today: WBC of 11.93 , Na 130, Cl 93.  Blood cultures have no growth to date. Sputum gram stain grew rare gram positive cocci and rare gram negative rods. FL Esophagram showed a patent esophagus, but was limited due to abigail aspiration. GI was consulted and recommended follow up outpatient for manometry for dysmotility. PEG vs continued PO intake was discussed with patient. Elevated troponin was thought to be due to demand ischemia from pneumonia hypoxia.    TTE demonstrated nornal EF 60-65%, mildly increased ventricular mass, and normal diastolic function. He has been afebrile and vitals signs have been stable. IV antibiotics are continued. Will attempt to ween pt off of O2.     Care Update 3/8/1729  Patient had aspiration event this evening drinking water. Family discussion held with niece.MBSS and esophagram findings were discussed. Impaired esophageal motility places  patient at increased risk for aspiration despite aspiration precautions. GOC re-evaluated. Patient's  niece does not wish to have PEG tube. She understands patient will likely continue to aspiration ultimately affecting quality of care. Hospice explored and it was decided for patient [facility]. Will plan to discharge in AM.     3/9/2024  [facility] will get admit intake at the NH. He will be discharged back to the NH, as a DNR, on pureed diet and nectar thick liquids with aspiration precautions. He will be discharged on Augmentin 875/125 mg po bid for additional 4 days to complete abx course. BC NGTD. Sputum culture with normal respiratory juan. VSS. He remains afebrile. This patient was seen and examined on the date of discharge and determined suitable for discharge.      Goals of Care Treatment Preferences:  Code Status: DNR    Living Will: Yes     What is most important right now is to focus on avoiding the hospital, symptom/pain control, quality of life, even if it means sacrificing a little time, comfort and QOL .  Accordingly, we have decided that the best plan to meet the patient's goals includes enrolling in hospice care.      Consults:   Consults (From admission, onward)          Status Ordering Provider     Inpatient consult to Social Work  Once        Provider:  (Not yet assigned)    Acknowledged SUSANNA ABEBE     Pharmacy to dose Vancomycin consult  Once        Provider:  (Not yet assigned)   [facility] Report.    Acknowledged RUCHI SINGER.            No new Assessment & Plan notes have been filed under this hospital service since the last note was generated.  Service: Hospital Medicine    Final Active Diagnoses:    Diagnosis Date Noted POA    PRINCIPAL PROBLEM:  Bilateral pneumonia [J18.9] 03/06/2024 Yes    Advanced care planning/counseling discussion [Z71.89] 03/08/2024 Not Applicable    Dysphagia [R13.10] 03/07/2024 Yes     Type 2 diabetes mellitus with hyperglycemia [E11.65] 03/06/2024 Yes    Acute hypoxemic respiratory failure [J96.01] 03/06/2024 Yes    Sepsis [A41.9] 03/06/2024 Yes    COPD (chronic obstructive pulmonary disease) [J44.9] 07/20/2019 Yes    Essential hypertension [I10] 07/20/2019 Yes    Elevated troponin [R79.89] 07/20/2019 Yes      Problems Resolved During this Admission:       Discharged Condition: stable    Disposition: [facility]*    Follow Up:   Follow-up Information       Tanvir Petersen MD. Schedule an appointment as soon as possible for a visit in 1 week(s).    Specialty: Internal Medicine  Contact information:  [address] [facility] [address] [phone]               Hospice[facility] Follow up.    Contact information:  [address] [facility] [address] [phone]                           Patient Instructions:      Diet Dysphagia Pureed   Order Comments: Nectar thick liquids     Notify your health care provider if you experience any of the following:  increased confusion or weakness     Notify your health care provider if you experience any of the following:  persistent dizziness, light-headedness, or visual disturbances     Notify your health care provider if you experience any of the following:  difficulty breathing or increased cough     Activity as tolerated       Significant Diagnostic Studies:  Microbiology: Blood Culture   Lab Results   Component Value Date    LABBLOO No Growth to date 03/06/2024    LABBLOO No Growth to date 03/06/2024    LABBLOO No Growth to date 03/06/2024    LABBLOO No Growth to date 03/06/2024    LABBLOO No Growth to date 03/06/2024    LABBLOO No Growth to date 03/06/2024    and Sputum Culture   Lab Results   Component Value Date    GSRESP <10 epithelial cells per low power field. 03/07/2024    GSRESP Moderate WBC's 03/07/2024    GSRESP Rare Gram positive cocci 03/07/2024    GSRESP Rare Gram negative rods 03/07/2024     RESPIRATORYC Normal respiratory juan 03/07/2024       Pending Diagnostic Studies:       Procedure Component Value Units Date/Time    Creatinine, Serum [[id number]]     Order Status: Sent Lab Status: No result     Specimen: Blood     Vancomycin, random [[id number]]     Order Status: Sent Lab Status: No result     Specimen: Blood            Medications:  Reconciled Home Medications:      Medication List        START taking these medications      amoxicillin-clavulanate 875-125mg 875-125 mg per tablet  Commonly known as: AUGMENTIN  Take 1 tablet by mouth 2 (two) times daily. for 4 days            CONTINUE taking these medications      acetaminophen suppository  Commonly known as: TYLENOL  Place 650 mg rectally every 4 (four) hours as needed for Temperature greater than.     ALPRAZolam 0.25 MG tablet  Commonly known as: XANAX  Take 0.25 mg by mouth 2 (two) times daily as needed for Anxiety.     amLODIPine 5 MG tablet  Commonly known as: NORVASC  Take 5 mg by mouth once daily.     CLARITIN ORAL  Take 1 tablet by mouth daily as needed.     diclofenac sodium 1 % Gel  Commonly known as: VOLTAREN  Apply 2 g topically 3 (three) times daily.     docusate sodium 100 MG capsule  Commonly known as: COLACE  Take 100 mg by mouth 2 (two) times daily.     EScitalopram oxalate 10 MG tablet  Commonly known as: LEXAPRO  Take 10 mg by mouth once daily.     furosemide 40 MG tablet  Commonly known as: LASIX  Take 40 mg by mouth once daily.     hydrOXYzine pamoate 50 MG Cap  Commonly known as: VISTARIL  Take 50 mg by mouth 3 (three) times daily.     meclizine 12.5 mg tablet  Commonly known as: ANTIVERT  Take 12.5 mg by mouth 2 (two) times daily.     metFORMIN 1000 MG tablet  Commonly known as: GLUCOPHAGE  Take 1,000 mg by mouth 2 (two) times daily with meals.     ondansetron 4 MG tablet  Commonly known as: ZOFRAN  Take 4 mg by mouth every 8 (eight) hours as needed for Nausea.     polyethylene glycol 17 gram Pwpk  Commonly known as:  GLYCOLAX  Take by mouth.     potassium chloride 10 MEQ Cpsr  Commonly known as: MICRO-K  Take 10 mEq by mouth once daily.     tamsulosin 0.4 mg Cap  Commonly known as: FLOMAX  Take 0.4 mg by mouth once daily.     traZODone 100 MG tablet  Commonly known as: DESYREL  Take 100 mg by mouth every evening.            STOP taking these medications      senna 8.6 mg tablet  Commonly known as: SENOKOT              Indwelling Lines/Drains at time of discharge:   Lines/Drains/Airways       None                   Time spent on the discharge of patient: 30 minutes         JUSTIN Chinchilla  Department of Hospital Medicine  '[facility]

## 2024-03-09 NOTE — DISCHARGE INSTRUCTIONS
No straws and the implementation of small bites/sips, one bite/sip at a time, behavioral reflux precautions, and remaining upright for 2-3 hours after meals s/t esophageal retention/reflux.

## 2024-03-09 NOTE — PLAN OF CARE
Patient being discharged to [facility] in stable condition. Patient remains free from injury/falls during stay. IV removed per MD order, catheter intact.

## 2024-03-09 NOTE — PROGRESS NOTES
Pharmacokinetic Assessment Follow Up: IV Vancomycin    Vancomycin serum concentration assessment(s):    The random level was drawn correctly and can be used to guide therapy at this time. The measurement is within the desired definitive target range of 10 to 20 mcg/mL.    Vancomycin Regimen Plan:    Administer vancomycin 1000 mg IV once and obtain a random vancomycin level at 1500 on 3/9/24.    Drug levels (last 3 results):  Recent Labs   Lab Result Units 03/07/24  2226 03/08/24  1214 03/08/24  1828   Vancomycin, Random ug/mL 18.5 23.5 19.8       Pharmacy will continue to follow and monitor vancomycin.    Please contact pharmacy at extension [phone] for questions regarding this assessment.    Thank you for the consult,   Brennen Marcos       Patient brief summary:  Troy Chau is a 88 y.o. male initiated on antimicrobial therapy with IV Vancomycin for treatment of lower respiratory infection    The patient's current regimen is pulse dosing.    Drug Allergies:   Review of patient's allergies indicates:  No Known Allergies    Actual Body Weight:   99.8 kg    Renal Function:   Estimated Creatinine Clearance: 60.5 mL/min (based on SCr of 0.9 mg/dL).,     Dialysis Method (if applicable):  N/A    CBC (last 72 hours):  Recent Labs   Lab Result Units 03/06/24  1154 03/07/24  0459 03/08/24  1214   WBC K/uL 26.51* 16.49* 11.93   Hemoglobin g/dL 10.3* 10.9* 10.1*   Hematocrit % 32.2* 34.4* 31.8*   Platelets K/uL 284 281 262   Gran % % 92.4* 84.6* 82.0*   Lymph % % 1.9* 6.5* 7.5*   Mono % % 4.5 7.1 7.2   Eosinophil % % 0.3 1.0 2.2   Basophil % % 0.1 0.3 0.3   Differential Method  Automated Automated Automated       Metabolic Panel (last 72 hours):  Recent Labs   Lab Result Units 03/06/24  1154 03/06/24  1307 03/07/24  0459 03/08/24  1214   Sodium mmol/L 131*  --  131* 130*   Potassium mmol/L 4.7  --  4.9 4.5   Chloride mmol/L 93*  --  94* 93*   CO2 mmol/L 26  --  28 30*   Glucose mg/dL 251*  --  130* 212*   Glucose, UA   --   1+*  --   --    BUN mg/dL 15  --  16 16   Creatinine mg/dL 1.1  --  0.9 0.9   Albumin g/dL 3.3*  --  3.2* 2.8*   Total Bilirubin mg/dL 0.4  --  0.5 0.4   Alkaline Phosphatase U/L 84  --  89 79   AST U/L 15  --  14 11   ALT U/L 12  --  12 12   Magnesium mg/dL  --   --  1.9 1.9   Phosphorus mg/dL  --   --  3.6 3.1       Vancomycin Administrations:  vancomycin given in the last 96 hours                     vancomycin (VANCOCIN) 1,000 mg in dextrose 5 % (D5W) 250 mL IVPB (Vial-Mate) (mg) 1,000 mg New Bag 03/08/24 2109    vancomycin 1,250 mg in dextrose 5 % (D5W) 250 mL IVPB (Vial-Mate) (mg) 1,250 mg New Bag 03/08/24 0027    vancomycin 1,500 mg in dextrose 5 % (D5W) 250 mL IVPB (Vial-Mate) (mg) 1,500 mg New Bag 03/07/24 0942    vancomycin 2 g in dextrose 5 % 500 mL IVPB (mg) 2,000 mg New Bag 03/06/24 1810                    Microbiologic Results:  Microbiology Results (last 7 days)       Procedure Component Value Units Date/Time    Culture, Respiratory with Gram Stain [[id number]] Collected: 03/07/24 1727    Order Status: Completed Specimen: Respiratory from Sputum, Expectorated Updated: 03/08/24 0256     Gram Stain (Respiratory) <10 epithelial cells per low power field.     Gram Stain (Respiratory) Moderate WBC's     Gram Stain (Respiratory) Rare Gram positive cocci     Gram Stain (Respiratory) Rare Gram negative rods    Blood Culture #1 **CANNOT BE ORDERED STAT** [[id number]] Collected: 03/06/24 1609    Order Status: Completed Specimen: Blood from Peripheral, Antecubital, Left Updated: 03/07/24 2322     Blood Culture, Routine No Growth to date      No Growth to date    Blood Culture #2 **CANNOT BE ORDERED STAT** [[id number]] Collected: 03/06/24 1609    Order Status: Completed Specimen: Blood from Peripheral, Antecubital, Right Updated: 03/07/24 2322     Blood Culture, Routine No Growth to date      No Growth to date    Blood culture [[id number]]     Order Status: Canceled Specimen: Blood     Blood culture [[id number]]      Order Status: Canceled Specimen: Blood     Influenza A & B by Molecular [[id number]] Collected: 03/06/24 1215    Order Status: Completed Specimen: Nasopharyngeal Swab Updated: 03/06/24 1251     Influenza A, Molecular Negative     Influenza B, Molecular Negative     Flu A & B Source Nasal swab

## 2024-03-09 NOTE — PLAN OF CARE
[facility] - Med Surg  Discharge Final Note    Primary Care Provider: Tanvir ePtersen MD    Expected Discharge Date: 3/9/2024    Final Discharge Note (most recent)       Final Note - 03/09/24 1231          Final Note    Assessment Type Final Discharge Note     Anticipated Discharge Disposition Hospice/Home        Post-Acute Status    Discharge Delays None known at this time                     Important Message from Medicare             Contact Info       Tanvir Petersen MD   Specialty: Internal Medicine   Relationship: PCP - General    [facility]  [address] [place] [id number]   Phone: [phone]       Next Steps: Schedule an appointment as soon as possible for a visit in 1 week(s)    [facility]    [address] [facility] [address]  [facility] [address]   Phone: [phone]       Next Steps: Follow up          [facility].

## 2024-03-09 NOTE — PLAN OF CARE
Bed locked, in lowest position. Call bell in reach. Purposeful rounding done. Blood glucose monitoring. Cardiac monitoring in use. IV antibiotics administered as ordered. Chart check complete. Will continue with plan of care.

## 2024-03-10 LAB
OHS QRS DURATION: 110 MS
OHS QTC CALCULATION: 434 MS

## 2024-03-11 LAB
BACTERIA BLD CULT: NORMAL
BACTERIA BLD CULT: NORMAL
BACTERIA SPEC AEROBE CULT: NORMAL
BACTERIA SPEC AEROBE CULT: NORMAL
GRAM STN SPEC: NORMAL

## 2024-06-10 PROBLEM — J18.9 BILATERAL PNEUMONIA: Status: RESOLVED | Noted: 2024-03-06 | Resolved: 2024-06-10

## 2024-06-10 PROBLEM — J96.01 ACUTE HYPOXEMIC RESPIRATORY FAILURE: Status: RESOLVED | Noted: 2024-03-06 | Resolved: 2024-06-10

## 2024-06-10 PROBLEM — A41.9 SEPSIS: Status: RESOLVED | Noted: 2024-03-06 | Resolved: 2024-06-10
